# Patient Record
Sex: FEMALE | Race: WHITE | NOT HISPANIC OR LATINO | Employment: FULL TIME | URBAN - METROPOLITAN AREA
[De-identification: names, ages, dates, MRNs, and addresses within clinical notes are randomized per-mention and may not be internally consistent; named-entity substitution may affect disease eponyms.]

---

## 2017-02-01 ENCOUNTER — OFFICE VISIT (OUTPATIENT)
Dept: OPHTHALMOLOGY | Facility: CLINIC | Age: 62
End: 2017-02-01

## 2017-02-01 DIAGNOSIS — H40.003 GLAUCOMA SUSPECT OF BOTH EYES: Primary | ICD-10-CM

## 2017-02-01 DIAGNOSIS — H25.13 SENILE NUCLEAR SCLEROSIS, BILATERAL: ICD-10-CM

## 2017-02-01 DIAGNOSIS — H52.4 PRESBYOPIA: ICD-10-CM

## 2017-02-01 ASSESSMENT — TONOMETRY
OS_IOP_MMHG: 18
IOP_METHOD: APPLANATION
OD_IOP_MMHG: 18

## 2017-02-01 ASSESSMENT — VISUAL ACUITY
OD_CC+: -1
METHOD: SNELLEN - LINEAR
OS_CC+: -2
OD_CC: 20/20
OS_CC: 20/20
OD_CC: J1+
OS_CC: J1+
CORRECTION_TYPE: GLASSES

## 2017-02-01 ASSESSMENT — EXTERNAL EXAM - RIGHT EYE: OD_EXAM: NORMAL

## 2017-02-01 ASSESSMENT — REFRACTION_WEARINGRX
OD_CYLINDER: +0.25
OD_SPHERE: -2.00
OS_ADD: +2.00
OD_ADD: +2.00
OS_CYLINDER: +0.25
SPECS_TYPE: PAL
OS_AXIS: 025
OD_AXIS: 006
OS_SPHERE: -2.25

## 2017-02-01 ASSESSMENT — REFRACTION_MANIFEST
OD_CYLINDER: +0.50
OD_ADD: +2.50
OS_CYLINDER: +0.50
OS_AXIS: 035
OD_AXIS: 175
OS_ADD: +2.50
OS_SPHERE: -2.00
OD_SPHERE: -2.00

## 2017-02-01 ASSESSMENT — CUP TO DISC RATIO
OD_RATIO: 0.6
OS_RATIO: 0.5

## 2017-02-01 ASSESSMENT — CONF VISUAL FIELD
OD_NORMAL: 1
OS_NORMAL: 1
METHOD: COUNTING FINGERS

## 2017-02-01 ASSESSMENT — SLIT LAMP EXAM - LIDS
COMMENTS: NORMAL
COMMENTS: NORMAL

## 2017-02-01 ASSESSMENT — EXTERNAL EXAM - LEFT EYE: OS_EXAM: NORMAL

## 2017-02-01 NOTE — MR AVS SNAPSHOT
After Visit Summary   2017    Angelic Correa    MRN: [id number]           Patient Information     Date Of Birth          1955        Visit Information        Provider Department      2017 10:00 AM Janine Buenrostro MD [facility]        Today's Diagnoses     Glaucoma suspect of both eyes    -  1     Senile nuclear sclerosis, bilateral         Presbyopia           Care Instructions    Patient will return to clinic in 1 year with repeat dilated eye exam, and OCT with RNFL analysis.          Follow-ups after your visit        Who to contact     Please call your clinic at [phone] to:    Ask questions about your health    Make or cancel appointments    Discuss your medicines    Learn about your test results    Speak to your doctor   If you have compliments or concerns about an experience at your clinic, or if you wish to file a complaint, please contact [facility] Patient Relations at [phone] or email us at [email or web address]         Additional Information About Your Visit        MyChart Information     [employer] is an electronic gateway that provides easy, online access to your medical records. With [employer], you can request a clinic appointment, read your test results, renew a prescription or communicate with your care team.     To sign up for [employer] visit the website at [email or web address]   You will be asked to enter the access code listed below, as well as some personal information. Please follow the directions to create your username and password.     Your access code is: [email or web address]  Expires: 2017 11:41 AM     Your access code will  in 90 days. If you need help or a new code, please contact your [facility] or call [phone] for assistance.        Care EveryWhere ID     This is your Care EveryWhere ID. This could be used by other organizations to access your  [facility] medical records  [id number]         Blood Pressure from Last 3 Encounters:   No data found for BP    Weight from Last 3 Encounters:   No data found for Wt              We Performed the Following     OCT Optic Nerve RNFL Spectralis OU (both eyes)     OVF 24-2 Dynamic OU     REFRACTION        Primary Care Provider Office Phone # Fax #    Marilou Bishop [phone]       [facility] [address] [facility] [address]        Thank you!     Thank you for choosing [facility]  for your care. Our goal is always to provide you with excellent care. Hearing back from our patients is one way we can continue to improve our services. Please take a few minutes to complete the written survey that you may receive in the mail after your visit with us. Thank you!             Your Updated Medication List - Protect others around you: Learn how to safely use, store and throw away your medicines at www.disposemymeds.org.          This list is accurate as of: 2/1/17 11:41 AM.  Always use your most recent med list.                   Brand Name Dispense Instructions for use    ZOLOFT PO      Take 20 mg by mouth daily

## 2017-02-01 NOTE — PROGRESS NOTES
1)Glc Suspect based CDR -- s/p PI OU -- K pachy: 591/602   Tmax:  23/23   HVF: Full     CDR:0.6/0.5     HRT/OCT: RNFL WNL      FHX of Glc:  Parents -- gtts    Gonio:open       Intolerant to:      Asthma/COPD: No  Steroid Use: No    Kidney Stones:No     Sulfa Allergy:Possibly, unsure      IOP targets:L-M20s -- IOP good  2)NS OU  3)Myopia    Patient will return to clinic in 1 year with repeat dilated eye exam, and OCT with RNFL analysis.

## 2017-02-01 NOTE — NURSING NOTE
Chief Complaints and History of Present Illnesses   Patient presents with     Annual Eye Exam     Complete eye exam / Glaucoma suspect history / desires new glasses     HPI    Symptoms:     No blurred vision   No decreased vision   No distorted vision   No difficulty with reading   No difficulty watching television   No difficulty with driving   Able to meet visual needs of job   No double vision   No puffy eyes   No floaters   No flashes   No glare   No halos   No starbursts   No ghost images   No wandering eye   No crossed eye   No redness   No foreign body sensation   No tearing   No Dryness   No itching   No burning   No eye discharge            Comments:  Complete eye exam / Glaucoma suspect history / desires new glasses.  No other concerns.  No use of eye meds or art tears.    Joan CHRISTIAN 10:16 AM 02/01/2017

## 2017-02-01 NOTE — PATIENT INSTRUCTIONS
Patient will return to clinic in 1 year with repeat dilated eye exam, and OCT with RNFL analysis.

## 2017-11-10 LAB
ALT SERPL-CCNC: 18 IU/L (ref 8–45)
AST SERPL-CCNC: 28 IU/L (ref 2–40)
CHOLEST SERPL-MCNC: 280 MG/DL (ref 100–199)
GLUCOSE SERPL-MCNC: 91 MG/DL (ref 65–100)
HDLC SERPL-MCNC: 83 MG/DL
HEP C HIM: NORMAL
LDLC SERPL CALC-MCNC: 177 MG/DL
NONHDLC SERPL-MCNC: 197 MG/DL
PAP-ABSTRACT: NORMAL
TRIGL SERPL-MCNC: 101 MG/DL
TSH SERPL-ACNC: 0.83 UIU/ML (ref 0.35–4.94)

## 2017-12-12 ENCOUNTER — TELEPHONE (OUTPATIENT)
Dept: OPHTHALMOLOGY | Facility: CLINIC | Age: 62
End: 2017-12-12

## 2017-12-12 NOTE — TELEPHONE ENCOUNTER
----- Message from Edson Hicks sent at 12/8/2017  2:38 PM CST -----  Regarding: Change 12/22 appt, sched VF  Contact: [phone]  Pt called to schedule Dr. Buenrostro appt (At [facility]) before the end of the year. Not knowing he will most likely be out of clinic, I scheduled her on 12/22 because it was the only day that showed up before 1/1/18. Mariya at [facility] told me you were filling appts before the end of the year w/ Porter at the [facility]. Please call pt to fit her in w/ Dr. Buenrostro for glaucoma appt before the end of the year at the [facility]. Pt phone is [phone].    Thank you,    Edson Hicks  Call Center    Please DO NOT send this message and/or reply back to sender. Call Center Representatives DO NOT respond to messages.

## 2017-12-12 NOTE — TELEPHONE ENCOUNTER
Left message with call back number for pt to reschedule appt from 12/22/17 at [facility] to sometime at [facility] (per epic message).  Jeni Simon COA 10:03 AM December 12, 2017

## 2017-12-22 ENCOUNTER — OFFICE VISIT (OUTPATIENT)
Dept: OPHTHALMOLOGY | Facility: CLINIC | Age: 62
End: 2017-12-22
Payer: COMMERCIAL

## 2017-12-22 DIAGNOSIS — H40.003 GLAUCOMA SUSPECT OF BOTH EYES: Primary | ICD-10-CM

## 2017-12-22 DIAGNOSIS — H52.4 PRESBYOPIA: ICD-10-CM

## 2017-12-22 DIAGNOSIS — H25.13 SENILE NUCLEAR SCLEROSIS, BILATERAL: ICD-10-CM

## 2017-12-22 RX ORDER — FLUTICASONE PROPIONATE 50 MCG
1 SPRAY, SUSPENSION (ML) NASAL
COMMUNITY
Start: 2016-11-04 | End: 2023-04-25

## 2017-12-22 ASSESSMENT — VISUAL ACUITY
METHOD: SNELLEN - LINEAR
OS_CC: 20/20
OD_CC: 20/15
CORRECTION_TYPE: GLASSES

## 2017-12-22 ASSESSMENT — REFRACTION_WEARINGRX
OS_AXIS: 025
SPECS_TYPE: PAL
OS_SPHERE: -2.25
OD_AXIS: 006
OD_ADD: +2.00
OS_CYLINDER: +0.25
OS_ADD: +2.00
OD_SPHERE: -2.00
OD_CYLINDER: +0.25

## 2017-12-22 ASSESSMENT — EXTERNAL EXAM - LEFT EYE: OS_EXAM: NORMAL

## 2017-12-22 ASSESSMENT — TONOMETRY
OS_IOP_MMHG: 21
IOP_METHOD: APPLANATION
OD_IOP_MMHG: 21

## 2017-12-22 ASSESSMENT — CUP TO DISC RATIO
OS_RATIO: 0.5
OD_RATIO: 0.6

## 2017-12-22 ASSESSMENT — CONF VISUAL FIELD
OS_NORMAL: 1
OD_NORMAL: 1

## 2017-12-22 ASSESSMENT — SLIT LAMP EXAM - LIDS
COMMENTS: NORMAL
COMMENTS: NORMAL

## 2017-12-22 ASSESSMENT — EXTERNAL EXAM - RIGHT EYE: OD_EXAM: NORMAL

## 2017-12-22 NOTE — NURSING NOTE
Chief Complaints and History of Present Illnesses   Patient presents with     Follow Up For     Glaucoma suspect of both eyes     HPI    Affected eye(s):  Both   Symptoms:     No blurred vision   No decreased vision   No distorted vision   No floaters   No flashes      Duration:  10 months   Frequency:  Constant       Do you have eye pain now?:  No      Comments:  States va is the same since last visit.  Suhail Austin  11:41 AM December 22, 2017

## 2017-12-22 NOTE — PROGRESS NOTES
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      Attending Physician Attestation:  Complete documentation of historical and exam elements from today's encounter can be found in the full encounter summary report (not reduplicated in this progress note). I personally obtained the chief complaint(s) and history of present illness.  I confirmed and edited as necessary the review of systems, past medical/surgical history, family history, social history, and examination findings as documented by others; and I examined the patient myself. I personally reviewed the relevant tests, images, and reports as documented above. I formulated and edited as necessary the assessment and plan and discussed the findings and management plan with the patient and family.  - Janine Buenrostro MD

## 2017-12-22 NOTE — MR AVS SNAPSHOT
After Visit Summary   2017    Angelic Correa    MRN: [id number]           Patient Information     Date Of Birth          1955        Visit Information        Provider Department      2017 10:45 AM Janine Buenrostro MD [facility]        Today's Diagnoses     Glaucoma suspect of both eyes    -  1    Presbyopia        Senile nuclear sclerosis, bilateral          Care Instructions    Patient will return to clinic in 1 year with repeat dilated eye exam, and visual field test.            Follow-ups after your visit        Follow-up notes from your care team     Return 1 year with repeat dilated eye exam, and visual field test. .      Who to contact     Please call your clinic at [phone] to:    Ask questions about your health    Make or cancel appointments    Discuss your medicines    Learn about your test results    Speak to your doctor   If you have compliments or concerns about an experience at your clinic, or if you wish to file a complaint, please contact [facility] Patient Relations at [phone] or email us at [email or web address]         Additional Information About Your Visit        MyChart Information     [employer] is an electronic gateway that provides easy, online access to your medical records. With [employer], you can request a clinic appointment, read your test results, renew a prescription or communicate with your care team.     To sign up for [employer] visit the website at [email or web address]   You will be asked to enter the access code listed below, as well as some personal information. Please follow the directions to create your username and password.     Your access code is: [email or web address]  Expires: 3/11/2018  6:30 AM     Your access code will  in 90 days. If you need help or a new code, please contact your [facility] or call [phone] for assistance.         Care EveryWhere ID     This is your Care EveryWhere ID. This could be used by other organizations to access your [facility] medical records  [id number]         Blood Pressure from Last 3 Encounters:   No data found for BP    Weight from Last 3 Encounters:   No data found for Wt              We Performed the Following     OCT Optic Nerve RNFL Spectralis OU (both eyes)        Primary Care Provider Office Phone # Fax #    Marilou Bishop [phone]       [facility] [address]  [facility] [address]        Equal Access to Services     MATT COKER : Hadii aad ku hadasho Soomaali, waaxda luqadaha, qaybta kaalmada adeegyada, waxay idiin hayaan adeeg kharash lamika . So [facility] [phone].    ATENCIÓN: Si habla español, tiene a kaplan disposición servicios gratuitos de asistencia lingüística. Llame al [phone].    We comply with applicable federal civil rights laws and [place] laws. We do not discriminate on the basis of race, color, national origin, age, disability, sex, sexual orientation, or gender identity.            Thank you!     Thank you for choosing [facility]  for your care. Our goal is always to provide you with excellent care. Hearing back from our patients is one way we can continue to improve our services. Please take a few minutes to complete the written survey that you may receive in the mail after your visit with us. Thank you!             Your Updated Medication List - Protect others around you: Learn how to safely use, store and throw away your medicines at www.disposemymeds.org.          This list is accurate as of: 12/22/17 12:01 PM.  Always use your most recent med list.                   Brand Name Dispense Instructions for use Diagnosis    fluticasone 50 MCG/ACT spray    FLONASE     1 spray        ZOLOFT PO      Take 20 mg by mouth daily

## 2018-12-17 ENCOUNTER — TRANSFERRED RECORDS (OUTPATIENT)
Dept: MULTI SPECIALTY CLINIC | Facility: CLINIC | Age: 63
End: 2018-12-17

## 2019-04-09 ENCOUNTER — OFFICE VISIT (OUTPATIENT)
Dept: FAMILY MEDICINE | Facility: CLINIC | Age: 64
End: 2019-04-09

## 2019-04-09 VITALS
DIASTOLIC BLOOD PRESSURE: 84 MMHG | WEIGHT: 160 LBS | HEART RATE: 74 BPM | OXYGEN SATURATION: 100 % | RESPIRATION RATE: 16 BRPM | SYSTOLIC BLOOD PRESSURE: 136 MMHG | TEMPERATURE: 97.9 F

## 2019-04-09 DIAGNOSIS — R51.9 UNILATERAL FACIAL PAIN: Primary | ICD-10-CM

## 2019-04-09 PROCEDURE — 99214 OFFICE O/P EST MOD 30 MIN: CPT | Performed by: PHYSICIAN ASSISTANT

## 2019-04-09 RX ORDER — METHYLPREDNISOLONE 4 MG
TABLET, DOSE PACK ORAL
Qty: 21 TABLET | Refills: 0 | Status: SHIPPED | OUTPATIENT
Start: 2019-04-09 | End: 2019-04-16

## 2019-04-09 RX ORDER — VALACYCLOVIR HYDROCHLORIDE 1 G/1
1000 TABLET, FILM COATED ORAL 3 TIMES DAILY
Qty: 21 TABLET | Refills: 0 | Status: SHIPPED | OUTPATIENT
Start: 2019-04-09 | End: 2019-08-27

## 2019-04-09 ASSESSMENT — ENCOUNTER SYMPTOMS
MUSCULOSKELETAL NEGATIVE: 1
CARDIOVASCULAR NEGATIVE: 1
ENDOCRINE NEGATIVE: 1
EYES NEGATIVE: 1
GASTROINTESTINAL NEGATIVE: 1
PSYCHIATRIC NEGATIVE: 1

## 2019-04-09 NOTE — PROGRESS NOTES
SUBJECTIVE:                                                    Angelic Correa is a 64 year old female who presents to clinic today for the following health issues:      ACUTE ILLNESS SYMPTOMS      Duration: 2 weeks    Description  Fatigue/malaise, facial pain, neck pain and jaw pain    Severity: moderate    Accompanying signs and symptoms: getting over URI    History (predisposing factors):  none    Precipitating or alleviating factors: None    Therapies tried and outcome:  OTC NSAID    She had influenza two weeks ago  He has had residual malaise  The jaw pain started on the left (she uses a [employer] store TMJ mouth guard)  Pain is radiating down her face  Acupuncture yesterday - minimal relief  Now tooth pain as well  Tender left side of her neck  Left ear is slightly painful, no ear drainage  Her teeth are very sensitive - even with eating a sandwich   No pain with opening and closing the jaw  Worse when she gets up and moves around  Baseline tooth sensitivities are slightly worse  She is due for a teeth cleaning, but mostly up to date with dental visit    No vision changes  No tenderness of the left ear  No dizziness    She is travelling to [facility] this weekend    Problem list and histories reviewed & adjusted, as indicated.  Additional history: as documented    Patient Active Problem List   Diagnosis     Glaucoma suspect     Glaucoma suspect of both eyes     Senile nuclear sclerosis, bilateral     Presbyopia     Past Surgical History:   Procedure Laterality Date     LASER SURGERY OF EYE         Social History     Tobacco Use     Smoking status: Former Smoker     Last attempt to quit: 1996     Years since quittin.2     Smokeless tobacco: Never Used   Substance Use Topics     Alcohol use: Yes     Family History   Problem Relation Age of Onset     Glaucoma Mother      Glaucoma Maternal Aunt      Glaucoma Cousin      Macular Degeneration No family hx of          Current Outpatient Medications   Medication Sig  Dispense Refill     methylPREDNISolone (MEDROL DOSEPAK) 4 MG tablet therapy pack Follow Package Directions 21 tablet 0     Sertraline HCl (ZOLOFT PO) Take 20 mg by mouth daily       valACYclovir (VALTREX) 1000 mg tablet Take 1 tablet (1,000 mg) by mouth 3 times daily for 7 days 21 tablet 0     fluticasone (FLONASE) 50 MCG/ACT spray 1 spray       Allergies   Allergen Reactions     Pilocarpine Nausea and Vomiting       Review of Systems   Constitutional:        As in HPI   HENT:        As in HPI   Eyes: Negative.    Respiratory:        As in HPI   Cardiovascular: Negative.    Gastrointestinal: Negative.    Endocrine: Negative.    Genitourinary: Negative.    Musculoskeletal: Negative.    Skin: Negative.    Neurological:        As in HPI   Psychiatric/Behavioral: Negative.        OBJECTIVE:     /84   Pulse 74   Temp 97.9  F (36.6  C) (Tympanic)   Resp 16   Wt 72.6 kg (160 lb)   SpO2 100%   There is no height or weight on file to calculate BMI.    Physical Exam   Constitutional: She is oriented to person, place, and time. She appears well-developed and well-nourished. No distress.   HENT:   Head: Normocephalic.   Right Ear: External ear and ear canal normal. No tenderness.   Left Ear: External ear and ear canal normal. No tenderness.   Nose: Nose normal.   Mouth/Throat: Uvula is midline and oropharynx is clear and moist. No oral lesions. No dental abscesses or dental caries (teeth palpated - no tenderness).   No TMJ tenderness or palpable subluxation/dislocation   Eyes: Conjunctivae and EOM are normal.   Neck: Normal range of motion.   Pulmonary/Chest: Effort normal.   Lymphadenopathy:        Head (right side): No submandibular, no preauricular and no posterior auricular adenopathy present.        Head (left side): No submandibular, no preauricular and no posterior auricular adenopathy present.     She has no cervical adenopathy.   Neurological: She is alert and oriented to person, place, and time. A sensory  deficit (slight decreased sensation on the left side of the face) is present.   Cranial Nerves:  EYES: Normal, No nystagmus, EOM, PERRL  Normal corneal reflex  Normal masseter / temporalis  Normal face strength and symmetry  Normal hearing  Normal swallowing  Uvula midline  Full trapezius / sternocleidomastoid strength  Normal tongue protrusion   Skin: Skin is warm and dry. No rash noted. She is not diaphoretic.   Psychiatric: She has a normal mood and affect. Judgment normal.       No results found for this or any previous visit (from the past 24 hour(s)).    ASSESSMENT/PLAN:       ICD-10-CM    1. Unilateral facial pain R51 methylPREDNISolone (MEDROL DOSEPAK) 4 MG tablet therapy pack     valACYclovir (VALTREX) 1000 mg tablet      Differential includes   - trigeminal neuralgia - although her history is not classic for this.  I would like her to come back in a week and recheck.  Consider adding carbamazepine if still present.   - shingles - no skin lesions today, however she is instructed to start Valtrex ASAP if she develops a rash or blisters.  Rx for valtrex was printed today.   - TMJ: exam in inconsistent with this  - Temporal arteritis - exam is not consistent with this as she is not specifically tender along the temporal artery  - Dental caries - less likely with her exam today, although she should still schedule a dental cleaning      Return in about 1 week (around 4/16/2019) for facial pain recheck .    There are no Patient Instructions on file for this visit.    Mendez Rodriguez PA-C  [facility]

## 2019-04-16 ENCOUNTER — OFFICE VISIT (OUTPATIENT)
Dept: FAMILY MEDICINE | Facility: CLINIC | Age: 64
End: 2019-04-16

## 2019-04-16 ENCOUNTER — MYC MEDICAL ADVICE (OUTPATIENT)
Dept: FAMILY MEDICINE | Facility: CLINIC | Age: 64
End: 2019-04-16

## 2019-04-16 VITALS
WEIGHT: 160 LBS | SYSTOLIC BLOOD PRESSURE: 120 MMHG | RESPIRATION RATE: 16 BRPM | DIASTOLIC BLOOD PRESSURE: 70 MMHG | TEMPERATURE: 98 F | HEART RATE: 82 BPM

## 2019-04-16 DIAGNOSIS — R51.9 UNILATERAL FACIAL PAIN: Primary | ICD-10-CM

## 2019-04-16 DIAGNOSIS — K12.0 ULCER APHTHOUS ORAL: ICD-10-CM

## 2019-04-16 PROCEDURE — 99213 OFFICE O/P EST LOW 20 MIN: CPT | Performed by: PHYSICIAN ASSISTANT

## 2019-04-16 RX ORDER — CARBAMAZEPINE 200 MG/1
200 CAPSULE, EXTENDED RELEASE ORAL 2 TIMES DAILY
Qty: 60 CAPSULE | Refills: 1 | Status: SHIPPED | OUTPATIENT
Start: 2019-04-16 | End: 2019-08-27

## 2019-04-16 NOTE — PATIENT INSTRUCTIONS
Patient Education     Trigeminal Neuralgia  You have trigeminal neuralgia. This is pain caused by irritation of the trigeminal nerve on your face. Symptoms include sudden, sharp pain in your head or face. It may feel like an electric shock. It can last for several seconds or minutes. It usually happens on only 1 side of your face. Pain may be triggered by things like moving your jaw or a touch on the skin of your face. The pain may be caused by something irritating the trigeminal nerve, such as a blood vessel pressing against it. But the exact cause of this problem often isn t known. Although it can be quite painful, the condition isn t dangerous.  Trigeminal neuralgia is often treated with medicines. These include anti-seizure medicines or antidepressants. Certain other treatments may also help. In some cases, you may need surgery.    Home care  Your healthcare provider may prescribe medicines to help relieve and prevent pain. Take all medicines as directed. Please note that it may take several changes in dose and medicines before the right combination is found that controls the pain.  General care:    Plan to rest at home today.    Avoid any specific activities that seem to trigger the pain.    Over the next few weeks, keep a pain diary. Write down when your symptoms happen and how they feel. Certain activities such as touching your face, chewing, talking, or brushing your teeth may bring on the pain. Cold air can also trigger the pain. Make sure you write down any triggers and discuss these with your healthcare provider. This will help guide treatment.  Follow-up care  Follow up with your healthcare provider, or as advised. If you were referred to a neurologist, be sure to make an appointment.  For more information on your condition, visit:    Facial [employer] Association www.fpa-support.org  When to seek medical advice  Call your healthcare provider right away if any of these occur:    Fever of 100.4 F (38 C) or  higher, or as advised    Headache with very stiff neck    You aren t able to keep liquids down (repeated vomiting)    Extreme drowsiness or confusion    Dizziness or fainting    A new feeling of weakness or numbness or tingling in your arm, leg, or face    Difficulty speaking or seeing  Date Last Reviewed: 8/1/2016 2000-2018 [employer]. [address] [place], [place] [id number]. All rights reserved. This information is not intended as a substitute for professional medical care. Always follow your healthcare professional's instructions.

## 2019-04-16 NOTE — PROGRESS NOTES
SUBJECTIVE:   Angelic Correa is a 64 year old female who presents to clinic today for the following   health issues:        Jaw Pain      Duration: 2 weeks    Description (location/character/radiation): jaw pain    Intensity:  mild    Accompanying signs and symptoms: 7/10 constant pain    History (similar episodes/previous evaluation): seen here 19    Precipitating or alleviating factors: None    Therapies tried and outcome: medrol dose carmela- not effective     Her pain is still present, however is now more localized to the left temple and left jaw under the mandible.  She also has new sores in her mouth she has not started the acyclovir.    She has pain with eating, however not always.   She is tender over the ulcers in her mouth.  She commonly gets mouth ulcers when she is ill and states this has happened many times before.   She denies and facial weakness      Additional history: as documented    Reviewed  and updated as needed this visit by clinical staff  Tobacco  Allergies  Meds         Reviewed and updated as needed this visit by Provider         Patient Active Problem List   Diagnosis     Glaucoma suspect     Glaucoma suspect of both eyes     Senile nuclear sclerosis, bilateral     Presbyopia     Past Surgical History:   Procedure Laterality Date     LASER SURGERY OF EYE         Social History     Tobacco Use     Smoking status: Former Smoker     Last attempt to quit: 1996     Years since quittin.3     Smokeless tobacco: Never Used   Substance Use Topics     Alcohol use: Yes     Family History   Problem Relation Age of Onset     Glaucoma Mother      Glaucoma Maternal Aunt      Glaucoma Cousin      Macular Degeneration No family hx of          Current Outpatient Medications   Medication Sig Dispense Refill     carBAMazepine (CARBATROL) 200 MG 12 hr capsule Take 1 capsule (200 mg) by mouth 2 times daily 60 capsule 1     fluticasone (FLONASE) 50 MCG/ACT spray 1 spray       magic mouthwash (ENTER  INGREDIENTS IN COMMENTS) suspension Take 5 mLs by mouth every 4 hours as needed (mouth pain) Mix 1:1:1 liquid diphenhydramine, viscous lidocaine, and  mL 0     Sertraline HCl (ZOLOFT PO) Take 20 mg by mouth daily       valACYclovir (VALTREX) 1000 mg tablet Take 1 tablet (1,000 mg) by mouth 3 times daily for 7 days 21 tablet 0     valACYclovir (VALTREX) 1000 mg tablet Take 1 tablet (1,000 mg) by mouth 3 times daily for 7 days 21 tablet 0     Allergies   Allergen Reactions     Pilocarpine Nausea and Vomiting       Review of Systems   Constitutional: Negative.    HENT:        As in HPI   Eyes: Negative.    Respiratory: Negative.    Cardiovascular: Negative.    Gastrointestinal: Negative.    Genitourinary: Negative.    Musculoskeletal: Negative.    Skin: Negative.    Neurological:        As in HPI   Psychiatric/Behavioral: Negative.        OBJECTIVE:     /70 (Cuff Size: Adult Regular)   Pulse 82   Temp 98  F (36.7  C) (Tympanic)   Resp 16   Wt 72.6 kg (160 lb)   There is no height or weight on file to calculate BMI.    Physical Exam   Constitutional: She is oriented to person, place, and time. She appears well-developed and well-nourished. No distress.   HENT:   Head: Normocephalic.   Right Ear: External ear normal.   Left Ear: External ear normal.   Nose: Nose normal.   Mouth/Throat: Oral lesions present. No dental abscesses or dental caries (no tenderness when palpating the teeth).       Eyes: Conjunctivae and EOM are normal.   Neck: Normal range of motion.   Pulmonary/Chest: Effort normal.   Neurological: She is alert and oriented to person, place, and time.   Normal facial sensation bilaterally.   Facial muscles symmetric.   Skin: She is not diaphoretic.   Psychiatric: She has a normal mood and affect. Judgment normal.       No results found for this or any previous visit (from the past 24 hour(s)).    ASSESSMENT/PLAN:       ICD-10-CM    1. Unilateral facial pain R51 carBAMazepine (CARBATROL) 200 MG  12 hr capsule     valACYclovir (VALTREX) 1000 mg tablet   2. Ulcer aphthous oral K12.0 magic mouthwash (ENTER INGREDIENTS IN COMMENTS) suspension     valACYclovir (VALTREX) 1000 mg tablet      Patient has acyclovir Rx but has not yet started it  Diagnosis is not perfectly clear - DDx includes trigeminal neuralgia, shingles, aphthous ulcers  Will cover for shingles as well    Return in about 2 weeks (around 4/30/2019) for facial pain recheck.    Patient Instructions     Patient Education     Trigeminal Neuralgia  You have trigeminal neuralgia. This is pain caused by irritation of the trigeminal nerve on your face. Symptoms include sudden, sharp pain in your head or face. It may feel like an electric shock. It can last for several seconds or minutes. It usually happens on only 1 side of your face. Pain may be triggered by things like moving your jaw or a touch on the skin of your face. The pain may be caused by something irritating the trigeminal nerve, such as a blood vessel pressing against it. But the exact cause of this problem often isn t known. Although it can be quite painful, the condition isn t dangerous.  Trigeminal neuralgia is often treated with medicines. These include anti-seizure medicines or antidepressants. Certain other treatments may also help. In some cases, you may need surgery.    Home care  Your healthcare provider may prescribe medicines to help relieve and prevent pain. Take all medicines as directed. Please note that it may take several changes in dose and medicines before the right combination is found that controls the pain.  General care:    Plan to rest at home today.    Avoid any specific activities that seem to trigger the pain.    Over the next few weeks, keep a pain diary. Write down when your symptoms happen and how they feel. Certain activities such as touching your face, chewing, talking, or brushing your teeth may bring on the pain. Cold air can also trigger the pain. Make sure  you write down any triggers and discuss these with your healthcare provider. This will help guide treatment.  Follow-up care  Follow up with your healthcare provider, or as advised. If you were referred to a neurologist, be sure to make an appointment.  For more information on your condition, visit:    [employer] Association www.fpa-support.org  When to seek medical advice  Call your healthcare provider right away if any of these occur:    Fever of 100.4 F (38 C) or higher, or as advised    Headache with very stiff neck    You aren t able to keep liquids down (repeated vomiting)    Extreme drowsiness or confusion    Dizziness or fainting    A new feeling of weakness or numbness or tingling in your arm, leg, or face    Difficulty speaking or seeing  Date Last Reviewed: 8/1/2016 2000-2018 [employer]. [address] [place] [address], [place] [address]. All rights reserved. This information is not intended as a substitute for professional medical care. Always follow your healthcare professional's instructions.               Mendez Rodriguez PA-C  [facility]

## 2019-04-16 NOTE — TELEPHONE ENCOUNTER
Mendez would like to see patient. I reached patient and we scheduled    Jesi Barger RN- Triage [employer]

## 2019-04-18 RX ORDER — VALACYCLOVIR HYDROCHLORIDE 1 G/1
1000 TABLET, FILM COATED ORAL 3 TIMES DAILY
Qty: 21 TABLET | Refills: 0 | Status: SHIPPED | OUTPATIENT
Start: 2019-04-18 | End: 2019-08-27

## 2019-04-18 ASSESSMENT — ENCOUNTER SYMPTOMS
GASTROINTESTINAL NEGATIVE: 1
EYES NEGATIVE: 1
CONSTITUTIONAL NEGATIVE: 1
CARDIOVASCULAR NEGATIVE: 1
PSYCHIATRIC NEGATIVE: 1
RESPIRATORY NEGATIVE: 1
MUSCULOSKELETAL NEGATIVE: 1

## 2019-05-03 ENCOUNTER — HEALTH MAINTENANCE LETTER (OUTPATIENT)
Age: 64
End: 2019-05-03

## 2019-06-14 ENCOUNTER — OFFICE VISIT (OUTPATIENT)
Dept: OPHTHALMOLOGY | Facility: CLINIC | Age: 64
End: 2019-06-14
Payer: COMMERCIAL

## 2019-06-14 DIAGNOSIS — H40.003 GLAUCOMA SUSPECT OF BOTH EYES: Primary | ICD-10-CM

## 2019-06-14 DIAGNOSIS — H25.13 SENILE NUCLEAR SCLEROSIS, BILATERAL: ICD-10-CM

## 2019-06-14 ASSESSMENT — TONOMETRY
OD_IOP_MMHG: 16
OS_IOP_MMHG: 17
IOP_METHOD: APPLANATION

## 2019-06-14 ASSESSMENT — REFRACTION_MANIFEST
OS_CYLINDER: +0.50
OD_ADD: +2.50
OD_AXIS: 005
OS_SPHERE: -1.75
OS_ADD: +2.50
OS_AXIS: 035
OD_SPHERE: -2.00
OD_CYLINDER: +0.75

## 2019-06-14 ASSESSMENT — SLIT LAMP EXAM - LIDS
COMMENTS: NORMAL
COMMENTS: NORMAL

## 2019-06-14 ASSESSMENT — VISUAL ACUITY
OS_CC: 20/20
METHOD: SNELLEN - LINEAR
OD_CC: 20/20
CORRECTION_TYPE: GLASSES
OS_CC+: -1

## 2019-06-14 ASSESSMENT — CONF VISUAL FIELD
OS_NORMAL: 1
METHOD: COUNTING FINGERS
OD_NORMAL: 1

## 2019-06-14 ASSESSMENT — REFRACTION_WEARINGRX
OS_SPHERE: -2.25
OD_CYLINDER: +0.50
OS_CYLINDER: +0.50
OS_AXIS: 035
OD_AXIS: 175
OD_ADD: +2.50
OS_ADD: +2.50
OD_SPHERE: -2.00

## 2019-06-14 ASSESSMENT — EXTERNAL EXAM - LEFT EYE: OS_EXAM: NORMAL

## 2019-06-14 ASSESSMENT — CUP TO DISC RATIO
OD_RATIO: 0.6
OS_RATIO: 0.5

## 2019-06-14 ASSESSMENT — EXTERNAL EXAM - RIGHT EYE: OD_EXAM: NORMAL

## 2019-06-14 NOTE — NURSING NOTE
Chief Complaints and History of Present Illnesses   Patient presents with     Glaucoma Follow-Up     Chief Complaint(s) and History of Present Illness(es)     Glaucoma Follow-Up     Laterality: both eyes    Quality: Is having changes in the va      Associated symptoms: Negative for redness, dryness and tearing    Treatment side effects: none    Pain scale: 0/10              Comments     Elisabeth CHRISTIAN 10:58 AM June 14, 2019

## 2019-06-14 NOTE — PROGRESS NOTES
1)Glc Suspect based CDR -- s/p PI OU -- K pachy: 591/602   Tmax:  23/23   HVF: Full     CDR:0.6/0.5     HRT/OCT: RNFL WNL      FHX of Glc:  Parents -- gtts    Gonio:open       Intolerant to:      Asthma/COPD: No  Steroid Use: No    Kidney Stones:No     Sulfa Allergy:Possibly, unsure      IOP targets:L-M20s -- IOP good  2)NS OU  3)Myopia    Patient will return to clinic in 1 year with repeat dilated eye exam, and visual field test and OCT with RNFL analysis.      Attending Physician Attestation:  Complete documentation of historical and exam elements from today's encounter can be found in the full encounter summary report (not reduplicated in this progress note). I personally obtained the chief complaint(s) and history of present illness.  I confirmed and edited as necessary the review of systems, past medical/surgical history, family history, social history, and examination findings as documented by others; and I examined the patient myself. I personally reviewed the relevant tests, images, and reports as documented above. I formulated and edited as necessary the assessment and plan and discussed the findings and management plan with the patient and family.  - Janine Buenrostro MD

## 2019-06-14 NOTE — PATIENT INSTRUCTIONS
Patient will return to clinic in 1 year with repeat dilated eye exam, and visual field test and OCT with RNFL analysis.

## 2019-08-23 DIAGNOSIS — F32.5 MAJOR DEPRESSIVE DISORDER IN FULL REMISSION, UNSPECIFIED WHETHER RECURRENT (H): Primary | ICD-10-CM

## 2019-08-23 RX ORDER — SERTRALINE HYDROCHLORIDE 100 MG/1
100 TABLET, FILM COATED ORAL DAILY
Qty: 90 TABLET | Refills: 1 | Status: SHIPPED | OUTPATIENT
Start: 2019-08-23 | End: 2019-08-27

## 2019-08-23 RX ORDER — SERTRALINE HYDROCHLORIDE 100 MG/1
20 TABLET, FILM COATED ORAL DAILY
Status: CANCELLED | OUTPATIENT
Start: 2019-08-23

## 2019-08-23 NOTE — TELEPHONE ENCOUNTER
"Routing refill request to provider for review/approval because:  Medication is reported historical at 20 mg dose. Patient states she takes 100 mg. Provider to clarify which dose pt should be taking.    Requested Prescriptions   Pending Prescriptions Disp Refills     sertraline (ZOLOFT) 100 MG tablet  Last Written Prescription Date:  n/a  Last Fill Quantity: n/a,  # refills: n/a   Last office visit: 4/16/2019 with prescribing provider:  Michael   Future Office Visit:   Next 5 appointments (look out 90 days)    Aug 27, 2019 10:30 AM CDT  PHYSICAL with Priscila Rodriguez PA-C  [facility] [address] [place] [id number] [phone]                Sig: Take 0.5 tablets (50 mg) by mouth daily       SSRIs Protocol Passed - 8/23/2019  9:46 AM        Passed - Recent (12 mo) or future (30 days) visit within the authorizing provider's specialty     Patient had office visit in the last 12 months or has a visit in the next 30 days with authorizing provider or within the authorizing provider's specialty.  See \"Patient Info\" tab in inbasket, or \"Choose Columns\" in Meds & Orders section of the refill encounter.              Passed - Medication is active on med list        Passed - Patient is age 18 or older        Passed - No active pregnancy on record        Passed - No positive pregnancy test in last 12 months          "

## 2019-08-23 NOTE — TELEPHONE ENCOUNTER
Reason for Call:  Medication or medication refill:    Do you use a [facility]?  Name of the pharmacy and phone number for the current request:  [facility]    Name of the medication requested: Sertraline HCl (ZOLOFT PO) (patient states she take 100mg a day)    Other request: Patient states she takes 100mg per day    Can we leave a detailed message on this number? YES    Phone number patient can be reached at: Home number on file [phone] (home)    Best Time:     Call taken on 8/23/2019 at 9:45 AM by LORRAINE ERNANDEZ

## 2019-08-27 ENCOUNTER — OFFICE VISIT (OUTPATIENT)
Dept: FAMILY MEDICINE | Facility: CLINIC | Age: 64
End: 2019-08-27
Payer: COMMERCIAL

## 2019-08-27 VITALS
HEIGHT: 67 IN | TEMPERATURE: 98 F | OXYGEN SATURATION: 97 % | RESPIRATION RATE: 16 BRPM | DIASTOLIC BLOOD PRESSURE: 88 MMHG | BODY MASS INDEX: 24.88 KG/M2 | SYSTOLIC BLOOD PRESSURE: 132 MMHG | HEART RATE: 76 BPM | WEIGHT: 158.5 LBS

## 2019-08-27 DIAGNOSIS — F32.5 MAJOR DEPRESSIVE DISORDER IN FULL REMISSION, UNSPECIFIED WHETHER RECURRENT (H): ICD-10-CM

## 2019-08-27 DIAGNOSIS — Z12.31 ENCOUNTER FOR SCREENING MAMMOGRAM FOR BREAST CANCER: ICD-10-CM

## 2019-08-27 DIAGNOSIS — Z00.00 ROUTINE GENERAL MEDICAL EXAMINATION AT A HEALTH CARE FACILITY: Primary | ICD-10-CM

## 2019-08-27 DIAGNOSIS — Z23 NEED FOR HEPATITIS A IMMUNIZATION: ICD-10-CM

## 2019-08-27 PROCEDURE — 99396 PREV VISIT EST AGE 40-64: CPT | Mod: 25 | Performed by: PHYSICIAN ASSISTANT

## 2019-08-27 PROCEDURE — 90632 HEPA VACCINE ADULT IM: CPT | Performed by: PHYSICIAN ASSISTANT

## 2019-08-27 PROCEDURE — 90471 IMMUNIZATION ADMIN: CPT | Performed by: PHYSICIAN ASSISTANT

## 2019-08-27 RX ORDER — SERTRALINE HYDROCHLORIDE 100 MG/1
150 TABLET, FILM COATED ORAL DAILY
Qty: 135 TABLET | Refills: 1 | Status: SHIPPED | OUTPATIENT
Start: 2019-08-27 | End: 2020-04-01

## 2019-08-27 RX ORDER — MOMETASONE FUROATE MONOHYDRATE 50 UG/1
2 SPRAY, METERED NASAL DAILY
COMMUNITY
End: 2020-07-17

## 2019-08-27 RX ORDER — CLOBETASOL PROPIONATE 0.5 MG/G
CREAM TOPICAL 2 TIMES DAILY PRN
COMMUNITY
End: 2020-01-21

## 2019-08-27 ASSESSMENT — ANXIETY QUESTIONNAIRES
GAD7 TOTAL SCORE: 0
5. BEING SO RESTLESS THAT IT IS HARD TO SIT STILL: NOT AT ALL
7. FEELING AFRAID AS IF SOMETHING AWFUL MIGHT HAPPEN: NOT AT ALL
7. FEELING AFRAID AS IF SOMETHING AWFUL MIGHT HAPPEN: NOT AT ALL
1. FEELING NERVOUS, ANXIOUS, OR ON EDGE: NOT AT ALL
4. TROUBLE RELAXING: NOT AT ALL
GAD7 TOTAL SCORE: 0
6. BECOMING EASILY ANNOYED OR IRRITABLE: NOT AT ALL
GAD7 TOTAL SCORE: 0
3. WORRYING TOO MUCH ABOUT DIFFERENT THINGS: NOT AT ALL
2. NOT BEING ABLE TO STOP OR CONTROL WORRYING: NOT AT ALL

## 2019-08-27 ASSESSMENT — ENCOUNTER SYMPTOMS
CARDIOVASCULAR NEGATIVE: 1
EYES NEGATIVE: 1
NEUROLOGICAL NEGATIVE: 1
PSYCHIATRIC NEGATIVE: 1
GASTROINTESTINAL NEGATIVE: 1
MUSCULOSKELETAL NEGATIVE: 1

## 2019-08-27 ASSESSMENT — MIFFLIN-ST. JEOR: SCORE: 1307.61

## 2019-08-27 ASSESSMENT — PATIENT HEALTH QUESTIONNAIRE - PHQ9
10. IF YOU CHECKED OFF ANY PROBLEMS, HOW DIFFICULT HAVE THESE PROBLEMS MADE IT FOR YOU TO DO YOUR WORK, TAKE CARE OF THINGS AT HOME, OR GET ALONG WITH OTHER PEOPLE: SOMEWHAT DIFFICULT
SUM OF ALL RESPONSES TO PHQ QUESTIONS 1-9: 2
SUM OF ALL RESPONSES TO PHQ QUESTIONS 1-9: 2

## 2019-08-27 NOTE — PATIENT INSTRUCTIONS
Preventive Health Recommendations  Female Ages 50 - 64    Yearly exam: See your health care provider every year in order to  o Review health changes.   o Discuss preventive care.    o Review your medicines if your doctor has prescribed any.      Get a Pap test every three years (unless you have an abnormal result and your provider advises testing more often).    If you get Pap tests with HPV test, you only need to test every 5 years, unless you have an abnormal result.     You do not need a Pap test if your uterus was removed (hysterectomy) and you have not had cancer.    You should be tested each year for STDs (sexually transmitted diseases) if you're at risk.     Have a mammogram every 1 to 2 years.    Have a colonoscopy at age 50, or have a yearly FIT test (stool test). These exams screen for colon cancer.      Have a cholesterol test every 5 years, or more often if advised.    Have a diabetes test (fasting glucose) every three years. If you are at risk for diabetes, you should have this test more often.     If you are at risk for osteoporosis (brittle bone disease), think about having a bone density scan (DEXA).    Shots: Get a flu shot each year. Get a tetanus shot every 10 years.    Nutrition:     Eat at least 5 servings of fruits and vegetables each day.    Eat whole-grain bread, whole-wheat pasta and brown rice instead of white grains and rice.    Get adequate Calcium and Vitamin D.     Lifestyle    Exercise at least 150 minutes a week (30 minutes a day, 5 days a week). This will help you control your weight and prevent disease.    Limit alcohol to one drink per day.    No smoking.     Wear sunscreen to prevent skin cancer.     See your dentist every six months for an exam and cleaning.    See your eye doctor every 1 to 2 years.    Preventive Health Recommendations  Female Ages 50 - 64    Yearly exam: See your health care provider every year in order to  o Review health changes.   o Discuss preventive  care.    o Review your medicines if your doctor has prescribed any.      Get a Pap test every three years (unless you have an abnormal result and your provider advises testing more often).    If you get Pap tests with HPV test, you only need to test every 5 years, unless you have an abnormal result.     You do not need a Pap test if your uterus was removed (hysterectomy) and you have not had cancer.    You should be tested each year for STDs (sexually transmitted diseases) if you're at risk.     Have a mammogram every 1 to 2 years.    Have a colonoscopy at age 50, or have a yearly FIT test (stool test). These exams screen for colon cancer.      Have a cholesterol test every 5 years, or more often if advised.    Have a diabetes test (fasting glucose) every three years. If you are at risk for diabetes, you should have this test more often.     If you are at risk for osteoporosis (brittle bone disease), think about having a bone density scan (DEXA).    Shots: Get a flu shot each year. Get a tetanus shot every 10 years.    Nutrition:     Eat at least 5 servings of fruits and vegetables each day.    Eat whole-grain bread, whole-wheat pasta and brown rice instead of white grains and rice.    Get adequate Calcium and Vitamin D.     Lifestyle    Exercise at least 150 minutes a week (30 minutes a day, 5 days a week). This will help you control your weight and prevent disease.    Limit alcohol to one drink per day.    No smoking.     Wear sunscreen to prevent skin cancer.     See your dentist every six months for an exam and cleaning.    See your eye doctor every 1 to 2 years.      Consider adding saline nasal spray or nasal flush for ongoing post-nasal drip  Continue the steroid nasal spray    Daily recommendations for calcium and vitamin D:  - Calcium 1200 mg daily (total for diet plus supplements)  - Vitamin D 800 international unit(s) daily (supplement preferred since it is hard to get enough vitamin D from our  environment in [place]).     You are due for a mammogram, which is a study to screen for breast cancer.  [facility] has several [facility] and also a walk-in mammogram service (no appointment needed!) at our [facility] location.  Below is the contact information.  Please complete the mammogram at your earliest convenience.  If you have any questions, call us at [facility] - [phone].    [facility] - [facility] walk-in mammogram services:  Address:   [facility] [address] [place] [id number]  Hours: M/Tu/Th 7:00AM-7:00PM, W/F 7:00AM-5:00PM    [facility]  [address]  [address]  [place] [address]  Appointments: [phone] or [phone]  Hours: M-F 7:00AM-5:00PM    [facility]  [address] [facility]  [address]  [place] [address]  Appointments: [phone] or [phone]

## 2019-08-27 NOTE — PROGRESS NOTES
tsh   SUBJECTIVE:   CC: Angelic Correa is an 64 year old woman who presents for preventive health visit.     Additional concerns: 1. Post nasal drip 2. Hep A vaccine-works at [employer] and there is a recent outbreak.    Healthy Habits:     Getting at least 3 servings of Calcium per day:  Yes    Bi-annual eye exam:  Yes    Dental care twice a year:  Yes    Sleep apnea or symptoms of sleep apnea:  None    Diet:  Regular (no restrictions)    Frequency of exercise:  2-3 days/week    Duration of exercise:  30-45 minutes    Taking medications regularly:  Yes    Barriers to taking medications:  None    Medication side effects:  None    PHQ-2 Total Score: 0    Additional concerns today:  No    She has constant post nasal drip  Using steroid nasal spray        Today's PHQ-2 Score:   PHQ-2 (  [employer]) 2019   Q1: Little interest or pleasure in doing things 0   Q2: Feeling down, depressed or hopeless 0   PHQ-2 Score 0   Q1: Little interest or pleasure in doing things Not at all   Q2: Feeling down, depressed or hopeless Not at all   PHQ-2 Score 0   Answers for HPI/ROS submitted by the patient on 2019   Annual Exam:  If you checked off any problems, how difficult have these problems made it for you to do your work, take care of things at home, or get along with other people?: Somewhat difficult  PHQ9 TOTAL SCORE: 2  SHIRLEY 7 TOTAL SCORE: 0      Abuse: Current or Past(Physical, Sexual or Emotional)- No  Do you feel safe in your environment? Yes    Social History     Tobacco Use     Smoking status: Former Smoker     Last attempt to quit: 1996     Years since quittin.6     Smokeless tobacco: Never Used   Substance Use Topics     Alcohol use: Yes     If you drink alcohol do you typically have >3 drinks per day or >7 drinks per week? No    AUDIT - Alcohol Use Disorders Identification Test - Reproduced from the World Health Organization Audit 2001 (Second Edition) 2019   1.  How often do you have a drink containing  alcohol? 4 or more times a week   2.  How many drinks containing alcohol do you have on a typical day when you are drinking? 1 or 2   3.  How often do you have five or more drinks on one occasion? Less than monthly   4.  How often during the last year have you found that you were not able to stop drinking once you had started? Never   5.  How often during the last year have you failed to do what was normally expected of you because of drinking? Never   6.  How often during the last year have you needed a first drink in the morning to get yourself going after a heavy drinking session? Never   7.  How often during the last year have you had a feeling of guilt or remorse after drinking? Never   8.  How often during the last year have you been unable to remember what happened the night before because of your drinking? Never   9.  Have you or someone else been injured because of your drinking? No   10. Has a relative, friend, doctor or other health care worker been concerned about your drinking or suggested you cut down? No   TOTAL SCORE 5       Reviewed orders with patient.  Reviewed health maintenance and updated orders accordingly - Yes  Patient Active Problem List   Diagnosis     Glaucoma suspect     Glaucoma suspect of both eyes     Senile nuclear sclerosis, bilateral     Presbyopia     Past Surgical History:   Procedure Laterality Date     LASER SURGERY OF EYE         Social History     Tobacco Use     Smoking status: Former Smoker     Last attempt to quit: 1996     Years since quittin.6     Smokeless tobacco: Never Used   Substance Use Topics     Alcohol use: Yes     Family History   Problem Relation Age of Onset     Glaucoma Mother      Alzheimer Disease Mother      Glaucoma Maternal Aunt      Thyroid Disease Maternal Aunt      Glaucoma Cousin      Parkinsonism Father      Coronary Artery Disease Father      Glaucoma Father      Thyroid Disease Sister      Macular Degeneration No family hx of       "    Current Outpatient Medications   Medication Sig Dispense Refill     clobetasol (TEMOVATE) 0.05 % external cream Apply topically 2 times daily as needed (eczema)       mometasone (NASONEX) 50 MCG/ACT nasal spray Spray 2 sprays into both nostrils daily       sertraline (ZOLOFT) 100 MG tablet Take 1.5 tablets (150 mg) by mouth daily 135 tablet 1     fluticasone (FLONASE) 50 MCG/ACT spray 1 spray       Allergies   Allergen Reactions     Apraclonidine Nausea and Vomiting     Bupropion GI Disturbance and Nausea     Generic only     No Clinical Screening - See Comments      seasonal, cosmetic     Pilocarpine Nausea and Vomiting     Sulfa Drugs Unknown       Mammogram Screening: Patient over age 50, mutual decision to screen reflected in health maintenance.    Pertinent mammograms are reviewed under the imaging tab.  History of abnormal Pap smear: Last 3 Pap and HPV Results:       Reviewed and updated as needed this visit by clinical staff  Tobacco  Allergies  Meds  Med Hx  Surg Hx  Fam Hx  Soc Hx        Reviewed and updated as needed this visit by Provider  Tobacco  Meds  Med Hx  Surg Hx  Fam Hx  Soc Hx           Review of Systems   Constitutional:        As in HPI   HENT:        As in HPI   Eyes: Negative.    Respiratory:        As in HPI   Cardiovascular: Negative.    Gastrointestinal: Negative.    Genitourinary: Negative.    Musculoskeletal: Negative.    Skin: Negative.    Neurological: Negative.    Psychiatric/Behavioral: Negative.           OBJECTIVE:   /88 (BP Location: Left arm, Patient Position: Sitting, Cuff Size: Adult Regular)   Pulse 76   Temp 98  F (36.7  C) (Tympanic)   Resp 16   Ht 1.695 m (5' 6.75\")   Wt 71.9 kg (158 lb 8 oz)   SpO2 97%   BMI 25.01 kg/m    Physical Exam   Constitutional: She is oriented to person, place, and time. She appears well-developed and well-nourished. No distress.   HENT:   Right Ear: Tympanic membrane and external ear normal.   Left Ear: Tympanic " membrane and external ear normal.   Nose: Nose normal.   Mouth/Throat: Oropharynx is clear and moist. No oropharyngeal exudate.   Eyes: Pupils are equal, round, and reactive to light. Conjunctivae are normal. Right eye exhibits no discharge. Left eye exhibits no discharge.   Neck: Neck supple. No tracheal deviation present. No thyromegaly present.   Cardiovascular: Normal rate, regular rhythm, S1 normal, S2 normal, normal heart sounds and normal pulses. Exam reveals no S3, no S4 and no friction rub.   No murmur heard.  Pulmonary/Chest: Effort normal and breath sounds normal. No respiratory distress. She has no wheezes. She has no rales. Right breast exhibits no mass, no nipple discharge and no tenderness. Left breast exhibits no mass, no nipple discharge and no tenderness.   Abdominal: Soft. She exhibits no mass. There is no hepatosplenomegaly. There is no tenderness.   Musculoskeletal: Normal range of motion. She exhibits no edema.   Lymphadenopathy:     She has no cervical adenopathy.   Neurological: She is alert and oriented to person, place, and time. She has normal strength and normal reflexes. She exhibits normal muscle tone.   Skin: Skin is warm and dry. No rash noted.   Psychiatric: She has a normal mood and affect. Judgment and thought content normal. Cognition and memory are normal.         Diagnostic Test Results:  No results found for this or any previous visit (from the past 24 hour(s)).    ASSESSMENT/PLAN:       ICD-10-CM    1. Routine general medical examination at a health care facility Z00.00 Lipid panel reflex to direct LDL Fasting     Basic metabolic panel     TSH with free T4 reflex   2. Encounter for screening mammogram for breast cancer Z12.31 MA Screen Bilateral w/Mino   3. Need for hepatitis A immunization Z23 HEPA VACCINE ADULT IM   4. Major depressive disorder in full remission, unspecified whether recurrent (H) F32.5 sertraline (ZOLOFT) 100 MG tablet        COUNSELING:  Reviewed preventive  "health counseling, as reflected in patient instructions    Estimated body mass index is 25.01 kg/m  as calculated from the following:    Height as of this encounter: 1.695 m (5' 6.75\").    Weight as of this encounter: 71.9 kg (158 lb 8 oz).         reports that she quit smoking about 23 years ago. She has never used smokeless tobacco.      Counseling Resources:  ATP IV Guidelines  Pooled Cohorts Equation Calculator  Breast Cancer Risk Calculator  FRAX Risk Assessment  ICSI Preventive Guidelines  Dietary Guidelines for Americans, 2010  USDA's MyPlate  ASA Prophylaxis  Lung CA Screening    Priscila Rodriguez PA-C  [facility]  "

## 2019-08-28 ASSESSMENT — ANXIETY QUESTIONNAIRES: GAD7 TOTAL SCORE: 0

## 2019-08-28 ASSESSMENT — PATIENT HEALTH QUESTIONNAIRE - PHQ9: SUM OF ALL RESPONSES TO PHQ QUESTIONS 1-9: 2

## 2019-09-25 ENCOUNTER — TELEPHONE (OUTPATIENT)
Dept: FAMILY MEDICINE | Facility: CLINIC | Age: 64
End: 2019-09-25

## 2019-09-25 ENCOUNTER — OFFICE VISIT (OUTPATIENT)
Dept: URGENT CARE | Facility: URGENT CARE | Age: 64
End: 2019-09-25
Payer: COMMERCIAL

## 2019-09-25 VITALS
OXYGEN SATURATION: 100 % | SYSTOLIC BLOOD PRESSURE: 160 MMHG | WEIGHT: 158 LBS | DIASTOLIC BLOOD PRESSURE: 98 MMHG | BODY MASS INDEX: 24.93 KG/M2 | RESPIRATION RATE: 16 BRPM | TEMPERATURE: 98.5 F | HEART RATE: 74 BPM

## 2019-09-25 DIAGNOSIS — R82.90 NONSPECIFIC FINDING ON EXAMINATION OF URINE: ICD-10-CM

## 2019-09-25 DIAGNOSIS — R30.0 DYSURIA: Primary | ICD-10-CM

## 2019-09-25 LAB
ALBUMIN UR-MCNC: NEGATIVE MG/DL
APPEARANCE UR: CLEAR
BILIRUB UR QL STRIP: ABNORMAL
COLOR UR AUTO: YELLOW
GLUCOSE UR STRIP-MCNC: NEGATIVE MG/DL
HGB UR QL STRIP: ABNORMAL
KETONES UR STRIP-MCNC: NEGATIVE MG/DL
LEUKOCYTE ESTERASE UR QL STRIP: ABNORMAL
MUCOUS THREADS #/AREA URNS LPF: PRESENT /LPF
NITRATE UR QL: NEGATIVE
PH UR STRIP: 6 PH (ref 5–7)
RBC #/AREA URNS AUTO: ABNORMAL /HPF
SOURCE: ABNORMAL
SP GR UR STRIP: 1.02 (ref 1–1.03)
UROBILINOGEN UR STRIP-ACNC: 0.2 EU/DL (ref 0.2–1)
WBC #/AREA URNS AUTO: ABNORMAL /HPF

## 2019-09-25 PROCEDURE — 99213 OFFICE O/P EST LOW 20 MIN: CPT | Performed by: PHYSICIAN ASSISTANT

## 2019-09-25 PROCEDURE — 87086 URINE CULTURE/COLONY COUNT: CPT | Performed by: PHYSICIAN ASSISTANT

## 2019-09-25 PROCEDURE — 81001 URINALYSIS AUTO W/SCOPE: CPT | Performed by: PHYSICIAN ASSISTANT

## 2019-09-25 RX ORDER — CIPROFLOXACIN 250 MG/1
250 TABLET, FILM COATED ORAL 2 TIMES DAILY
Qty: 14 TABLET | Refills: 0 | Status: SHIPPED | OUTPATIENT
Start: 2019-09-25 | End: 2019-10-02

## 2019-09-25 NOTE — TELEPHONE ENCOUNTER
Reason for call:  Patient reporting a symptom    Symptom or request:   Patient states she has early stages of UTI    Duration (how long have symptoms been present): just started   Patient states UTI's come on fast    Have you been treated for this before? Yes   Not by Mendez Rodriguez    Additional comments:   Patient is requesting a prescription be called to her pharmacy    She would also drop off a specimen if necessary     Patient states she has discussed her situation with Mendez Rodriguez    Phone Number patient can be reached at:  Home number on file [phone] (home)    Best Time:  anytime    Can we leave a detailed message on this number:  YES    Call taken on 9/25/2019 at 2:15 PM by SHAHRAM SILVA

## 2019-09-25 NOTE — TELEPHONE ENCOUNTER
Pt reports dysuria -urinary burning since this morning and lower abdominal pain. Denies fever, hematuria or back pain. She would like Rx today or can leave urine sample tomorrow morning. Advised she see UC today or do an e-visit. [facility] information given. Pt agreed to see UC today.

## 2019-09-26 ENCOUNTER — MYC MEDICAL ADVICE (OUTPATIENT)
Dept: OPHTHALMOLOGY | Facility: CLINIC | Age: 64
End: 2019-09-26

## 2019-09-26 LAB
BACTERIA SPEC CULT: NORMAL
SPECIMEN SOURCE: NORMAL

## 2019-10-03 NOTE — PROGRESS NOTES
SUBJECTIVE:   Angelic Correa is a 64 year old female who  presents today for a possible UTI. Symptoms of dysuria, urgency and frequency have been going on for 1day(s).  Hematuria no.  sudden onsetand mild.  There is no history of fever, chills, nausea or vomiting.   This patient does  have a history of urinary tract infections. Patient denies long duration, rigors, flank pain, temperature > 101 degrees F. and Vomiting, significant nausea or diarrhea      Past Medical History:   Diagnosis Date     Glaucoma      Allergies   Allergen Reactions     Apraclonidine Nausea and Vomiting     Bupropion GI Disturbance and Nausea     Generic only     No Clinical Screening - See Comments      seasonal, cosmetic     Pilocarpine Nausea and Vomiting     Sulfa Drugs Unknown     Social History     Tobacco Use     Smoking status: Former Smoker     Last attempt to quit: 1996     Years since quittin.7     Smokeless tobacco: Never Used   Substance Use Topics     Alcohol use: Yes     Family History   Problem Relation Age of Onset     Glaucoma Mother      Alzheimer Disease Mother      Glaucoma Maternal Aunt      Thyroid Disease Maternal Aunt      Glaucoma Cousin      Parkinsonism Father      Coronary Artery Disease Father      Glaucoma Father      Thyroid Disease Sister      Macular Degeneration No family hx of        ROS:   CONSTITUTIONAL:NEGATIVE for fever, chills, change in weight  INTEGUMENTARY/SKIN: NEGATIVE for worrisome rashes, moles or lesions  RESP:NEGATIVE for significant cough or SOB  CV: NEGATIVE for chest pain, palpitations or peripheral edema  GI: NEGATIVE for nausea, abdominal pain, heartburn, or change in bowel habits  : dysuria and frequency of urination  MUSCULOSKELETAL: NEGATIVE for significant arthralgias or myalgia  NEURO: NEGATIVE for weakness, dizziness or paresthesias    OBJECTIVE:  BP (!) 160/98 (BP Location: Left arm, Patient Position: Sitting, Cuff Size: Adult Regular)   Pulse 74   Temp 98.5  F (36.9   C) (Oral)   Resp 16   Wt 71.7 kg (158 lb)   SpO2 100%   BMI 24.93 kg/m    GENERAL APPEARANCE: healthy, alert and no distress  HENT: TM's normal bilaterally  NECK: no adenopathy  RESP: lungs clear to auscultation - no rales, rhonchi or wheezes  CV: regular rates and rhythm, normal S1 S2, no murmur noted  ABDOMEN:  soft, nontender, no HSM or masses and bowel sounds normal  BACK: No CVA tenderness  SKIN: no suspicious lesions or rashes    Results for orders placed or performed in visit on 09/25/19   UA with Microscopic reflex to Culture   Result Value Ref Range    Color Urine Yellow     Appearance Urine Clear     Glucose Urine Negative NEG^Negative mg/dL    Bilirubin Urine Small (A) NEG^Negative    Ketones Urine Negative NEG^Negative mg/dL    Specific Gravity Urine 1.025 1.003 - 1.035    pH Urine 6.0 5.0 - 7.0 pH    Protein Albumin Urine Negative NEG^Negative mg/dL    Urobilinogen Urine 0.2 0.2 - 1.0 EU/dL    Nitrite Urine Negative NEG^Negative    Blood Urine Large (A) NEG^Negative    Leukocyte Esterase Urine Small (A) NEG^Negative    Source Midstream Urine     WBC Urine 25-50 (A) OTO5^0 - 5 /HPF    RBC Urine 10-25 (A) OTO2^O - 2 /HPF    Mucous Urine Present (A) NEG^Negative /LPF       ASSESSMENT/PLAN      ICD-10-CM    1. Dysuria R30.0 UA with Microscopic reflex to Culture     ciprofloxacin (CIPRO) 250 MG tablet   2. Nonspecific finding on examination of urine R82.90 Urine Culture Aerobic Bacterial     ciprofloxacin (CIPRO) 250 MG tablet       Lower, uncomplicated urinary tract infection.    PLAN:  Orders Placed This Encounter     UA with Microscopic reflex to Culture     ciprofloxacin (CIPRO) 250 MG tablet       Urine culture pending  Drink plenty of fluids.  Prevention and treatment of UTI's discussed.Signs and symptoms of pyelonephritis mentioned.  Follow up with primary care physician if not improving

## 2019-11-07 ENCOUNTER — HEALTH MAINTENANCE LETTER (OUTPATIENT)
Age: 64
End: 2019-11-07

## 2019-12-04 ENCOUNTER — E-VISIT (OUTPATIENT)
Dept: FAMILY MEDICINE | Facility: CLINIC | Age: 64
End: 2019-12-04
Payer: COMMERCIAL

## 2019-12-04 DIAGNOSIS — B00.9 HSV (HERPES SIMPLEX VIRUS) INFECTION: Primary | ICD-10-CM

## 2019-12-04 PROCEDURE — 99444 ZZC PHYSICIAN ONLINE EVALUATION & MANAGEMENT SERVICE: CPT | Performed by: PHYSICIAN ASSISTANT

## 2019-12-04 RX ORDER — VALACYCLOVIR HYDROCHLORIDE 500 MG/1
500 TABLET, FILM COATED ORAL 2 TIMES DAILY
Qty: 6 TABLET | Refills: 3 | Status: SHIPPED | OUTPATIENT
Start: 2019-12-04 | End: 2020-03-18

## 2020-01-21 DIAGNOSIS — L30.9 ECZEMA, UNSPECIFIED TYPE: Primary | ICD-10-CM

## 2020-01-21 RX ORDER — CLOBETASOL PROPIONATE 0.5 MG/G
CREAM TOPICAL 2 TIMES DAILY PRN
Qty: 15 G | Refills: 3 | Status: SHIPPED | OUTPATIENT
Start: 2020-01-21 | End: 2021-06-18

## 2020-01-21 NOTE — TELEPHONE ENCOUNTER
clobetasol (TEMOVATE) 0.05 % external cream     Last Written Prescription Date:  na  Last Fill Quantity: na,  # refills: na   Last office visit: 8/27/2019 with prescribing provider:  Fitterer   Future Office Visit:      Routing refill request to provider for review/approval because:  Drug not on the [facility], [facility] or [facility] refill protocol or controlled substance

## 2020-01-24 ENCOUNTER — TELEPHONE (OUTPATIENT)
Dept: FAMILY MEDICINE | Facility: CLINIC | Age: 65
End: 2020-01-24

## 2020-01-24 DIAGNOSIS — L30.9 ECZEMA, UNSPECIFIED TYPE: Primary | ICD-10-CM

## 2020-01-24 NOTE — TELEPHONE ENCOUNTER
Prior Authorization Retail Medication Request    Medication/Dose: clobetasol (TEMOVATE) 0.05 % external cream   ICD code (if different than what is on RX):     Previously Tried and Failed:     Rationale:       Insurance Name:     Insurance ID:         Pharmacy Information (if different than what is on RX)  Name:     Phone:

## 2020-01-28 NOTE — TELEPHONE ENCOUNTER
Central Prior Authorization Team   Phone: [phone]      PA Initiation    Medication: clobetasol (TEMOVATE) 0.05 % external cream  Insurance Company: [employer] - Phone [phone] Fax [phone]  Pharmacy Filling the Rx: [id number] - [place], [place] - [address]  Filling Pharmacy Phone: [phone]  Filling Pharmacy Fax:    Start Date: 1/28/2020

## 2020-01-29 NOTE — TELEPHONE ENCOUNTER
PRIOR AUTHORIZATION DENIED    Medication: clobetasol (TEMOVATE) 0.05 % external cream    Denial Date: 1/29/2020    Denial Rational:      Appeal Information:    If you would like to appeal, please supply P/A team with a letter of medical necessity with clinical reason.

## 2020-01-30 RX ORDER — BETAMETHASONE DIPROPIONATE 0.5 MG/G
CREAM TOPICAL 2 TIMES DAILY
Qty: 50 G | Refills: 1 | Status: SHIPPED | OUTPATIENT
Start: 2020-01-30 | End: 2021-06-18

## 2020-01-30 NOTE — TELEPHONE ENCOUNTER
I will send an alternative medication and see if that is covered.  It is also cheaper.     Mendez Rodriguez PA-C

## 2020-03-17 DIAGNOSIS — B00.9 HSV (HERPES SIMPLEX VIRUS) INFECTION: ICD-10-CM

## 2020-03-17 NOTE — TELEPHONE ENCOUNTER
"Requested Prescriptions   Pending Prescriptions Disp Refills     valACYclovir (VALTREX) 500 MG tablet  Last Written Prescription Date:  12/4/2019  Last Fill Quantity: 6 tablet,  # refills: 3   Last office visit: 8/27/2019 with prescribing provider:  Michael   Future Office Visit:     6 tablet 3     Sig: Take 1 tablet (500 mg) by mouth 2 times daily       Antivirals for Herpes Protocol Failed - 3/17/2020 11:59 AM        Failed - Normal serum creatinine on file in past 12 months     No lab results found.    Ok to refill medication if creatinine is low          Passed - Patient is age 12 or older        Passed - Recent (12 mo) or future (30 days) visit within the authorizing provider's specialty     Patient has had an office visit with the authorizing provider or a provider within the authorizing providers department within the previous 12 mos or has a future within next 30 days. See \"Patient Info\" tab in inbasket, or \"Choose Columns\" in Meds & Orders section of the refill encounter.              Passed - Medication is active on med list              "

## 2020-03-18 ENCOUNTER — TELEPHONE (OUTPATIENT)
Dept: FAMILY MEDICINE | Facility: CLINIC | Age: 65
End: 2020-03-18

## 2020-03-18 DIAGNOSIS — R06.2 WHEEZING: Primary | ICD-10-CM

## 2020-03-18 RX ORDER — VALACYCLOVIR HYDROCHLORIDE 500 MG/1
500 TABLET, FILM COATED ORAL 2 TIMES DAILY
Qty: 6 TABLET | Refills: 3 | Status: SHIPPED | OUTPATIENT
Start: 2020-03-18 | End: 2020-07-07

## 2020-03-18 RX ORDER — ALBUTEROL SULFATE 90 UG/1
2 AEROSOL, METERED RESPIRATORY (INHALATION) EVERY 6 HOURS PRN
Qty: 1 INHALER | Refills: 0 | Status: SHIPPED | OUTPATIENT
Start: 2020-03-18 | End: 2021-06-18

## 2020-03-18 NOTE — TELEPHONE ENCOUNTER
Reason for Call:  Medication or medication refill:    Do you use a [facility]?  Name of the pharmacy and phone number for the current request:  [id number] - [place], [place] - [address]      Name of the medication requested: Inhaler     Other request: The patient called and stated that she went to  on 3/2 and was diagnosed with bronchitis.  The patient stated that she was prescribed Z-carmela and she is still experiencing a cough.  She is wondering if Mendez Rodriguez would be willing to prescriber her an inhaler that can help her feel better.     Can we leave a detailed message on this number? YES    Phone number patient can be reached at: Home number on file [phone] (home)    Best Time: Any    Call taken on 3/18/2020 at 11:36 AM by Fabby García

## 2020-03-18 NOTE — TELEPHONE ENCOUNTER
"Pt was seen 03/02/2020 at  [facility] requesting rx for inhailer. She denies chest pain but admits wheezing and low grade fever 99.5. States \"my left bronchi has not cleared up\". Notes she is coughing mostly in the morning. Denies other new symptoms. Please advice. Should she schedule a phone or e-visit for Rx?  "

## 2020-04-01 DIAGNOSIS — F32.5 MAJOR DEPRESSIVE DISORDER IN FULL REMISSION, UNSPECIFIED WHETHER RECURRENT (H): ICD-10-CM

## 2020-04-01 RX ORDER — SERTRALINE HYDROCHLORIDE 100 MG/1
150 TABLET, FILM COATED ORAL DAILY
Qty: 135 TABLET | Refills: 1 | Status: SHIPPED | OUTPATIENT
Start: 2020-04-01 | End: 2020-10-14

## 2020-04-01 RX ORDER — SERTRALINE HYDROCHLORIDE 100 MG/1
150 TABLET, FILM COATED ORAL DAILY
Qty: 135 TABLET | Refills: 1 | Status: CANCELLED | OUTPATIENT
Start: 2020-04-01

## 2020-04-01 NOTE — TELEPHONE ENCOUNTER
"Requested Prescriptions   Pending Prescriptions Disp Refills     sertraline (ZOLOFT) 100 MG tablet 135 tablet 1     Sig: Take 1.5 tablets (150 mg) by mouth daily   Last Written Prescription Date:  08/27/19  Last Fill Quantity: 135,  # refills: 1   Last office visit: 8/27/2019 with prescribing provider:  Michael   Future Office Visit:        [facility] Protocol Failed - 4/1/2020 12:22 PM        Failed - PHQ-9 score less than 5 in past 6 months     PHQ 8/27/2019   PHQ-9 Total Score 2   Q9: Thoughts of better off dead/self-harm past 2 weeks Not at all             Failed - Recent (6 mo) or future (30 days) visit within the authorizing provider's specialty     Patient had office visit in the last 6 months or has a visit in the next 30 days with authorizing provider or within the authorizing provider's specialty.  See \"Patient Info\" tab in inbasket, or \"Choose Columns\" in Meds & Orders section of the refill encounter.            Passed - Medication is active on med list        Passed - Patient is age 18 or older        Passed - No active pregnancy on record        Passed - No positive pregnancy test in last 12 months             "

## 2020-04-01 NOTE — TELEPHONE ENCOUNTER
Routing refill request to provider for review/approval because:  PHQ-9 out of date  No visit in the past 6 mo    Do you want e-visit or phone visit?

## 2020-07-03 ENCOUNTER — HOSPITAL ENCOUNTER (OUTPATIENT)
Dept: MAMMOGRAPHY | Facility: CLINIC | Age: 65
Discharge: HOME | End: 2020-07-03
Attending: PHYSICIAN ASSISTANT | Admitting: PHYSICIAN ASSISTANT
Payer: COMMERCIAL

## 2020-07-03 DIAGNOSIS — Z12.31 ENCOUNTER FOR SCREENING MAMMOGRAM FOR BREAST CANCER: ICD-10-CM

## 2020-07-03 PROCEDURE — 77063 BREAST TOMOSYNTHESIS BI: CPT

## 2020-07-07 ENCOUNTER — VIRTUAL VISIT (OUTPATIENT)
Dept: FAMILY MEDICINE | Facility: CLINIC | Age: 65
End: 2020-07-07
Payer: COMMERCIAL

## 2020-07-07 DIAGNOSIS — L01.00 IMPETIGO: Primary | ICD-10-CM

## 2020-07-07 DIAGNOSIS — B00.9 HSV (HERPES SIMPLEX VIRUS) INFECTION: ICD-10-CM

## 2020-07-07 PROCEDURE — 99214 OFFICE O/P EST MOD 30 MIN: CPT | Mod: 95 | Performed by: PHYSICIAN ASSISTANT

## 2020-07-07 RX ORDER — VALACYCLOVIR HYDROCHLORIDE 500 MG/1
500 TABLET, FILM COATED ORAL 2 TIMES DAILY
Qty: 6 TABLET | Refills: 3 | Status: SHIPPED | OUTPATIENT
Start: 2020-07-07 | End: 2020-10-29

## 2020-07-07 RX ORDER — MUPIROCIN 20 MG/G
OINTMENT TOPICAL 3 TIMES DAILY
Qty: 22 G | Refills: 0 | Status: SHIPPED | OUTPATIENT
Start: 2020-07-07 | End: 2020-07-17 | Stop reason: SINTOL

## 2020-07-07 ASSESSMENT — ENCOUNTER SYMPTOMS
CONSTITUTIONAL NEGATIVE: 1
RESPIRATORY NEGATIVE: 1
ROS SKIN COMMENTS: AS IN HPI
EYES NEGATIVE: 1
MUSCULOSKELETAL NEGATIVE: 1
GASTROINTESTINAL NEGATIVE: 1
CARDIOVASCULAR NEGATIVE: 1
NEUROLOGICAL NEGATIVE: 1
PSYCHIATRIC NEGATIVE: 1

## 2020-07-07 NOTE — PROGRESS NOTES
"PLAN:    IF NOT IMPROVING IN TWO WEEKS, PATIENT WILL CALL TO FILL ANTIBIOTIC  AFTER 7/20/20, ok to fill:  Cephalexin 250 mg   Take one tab 4 times a day for 7 days  28 tablets, no refill        Angelic Correa is a 65 year old female who is being evaluated via a billable video visit.      The patient has been notified of following:     \"This video visit will be conducted via a call between you and your physician/provider. We have found that certain health care needs can be provided without the need for an in-person physical exam.  This service lets us provide the care you need with a video conversation.  If a prescription is necessary we can send it directly to your pharmacy.  If lab work is needed we can place an order for that and you can then stop by our lab to have the test done at a later time.    Video visits are billed at different rates depending on your insurance coverage.  Please reach out to your insurance provider with any questions.    If during the course of the call the physician/provider feels a video visit is not appropriate, you will not be charged for this service.\"    Patient has given verbal consent for Video visit? Yes  How would you like to obtain your AVS? Mail a copy  Patient would like the video invitation sent by: Text to cell phone: [phone]  Will anyone else be joining your video visit? No      Subjective     Angelic Correa is a 65 year old female who presents today via video visit for the following health issues:    HPI  Rash      Duration: 4 months    Description  Location: nasal folds, chin  Itching: no    Intensity:  moderate    Accompanying signs and symptoms: pain    History (similar episodes/previous evaluation): yes, similar rash in the past    Precipitating or alleviating factors:  New exposures:  mask  Recent travel: no      Therapies tried and outcome: topical steroid - clobetasol (no improvement)        Video Start Time: 8:48 AM        Patient Active Problem List   Diagnosis     " Glaucoma suspect     Glaucoma suspect of both eyes     Senile nuclear sclerosis, bilateral     Presbyopia     HSV (herpes simplex virus) infection     Past Surgical History:   Procedure Laterality Date     LASER SURGERY OF EYE         Social History     Tobacco Use     Smoking status: Former Smoker     Last attempt to quit: 1996     Years since quittin.5     Smokeless tobacco: Never Used   Substance Use Topics     Alcohol use: Yes     Family History   Problem Relation Age of Onset     Glaucoma Mother      Alzheimer Disease Mother      Glaucoma Maternal Aunt      Thyroid Disease Maternal Aunt      Glaucoma Cousin      Parkinsonism Father      Coronary Artery Disease Father      Glaucoma Father      Thyroid Disease Sister      Macular Degeneration No family hx of          Current Outpatient Medications   Medication Sig Dispense Refill     mupirocin (BACTROBAN) 2 % external ointment Apply topically 3 times daily 22 g 0     valACYclovir (VALTREX) 500 MG tablet Take 1 tablet (500 mg) by mouth 2 times daily 6 tablet 3     albuterol (PROAIR HFA/PROVENTIL HFA/VENTOLIN HFA) 108 (90 Base) MCG/ACT inhaler Inhale 2 puffs into the lungs every 6 hours as needed for shortness of breath / dyspnea or wheezing 1 Inhaler 0     augmented betamethasone dipropionate (DIPROLENE-AF) 0.05 % external cream Apply topically 2 times daily 50 g 1     clobetasol (TEMOVATE) 0.05 % external cream Apply topically 2 times daily as needed (eczema) 15 g 3     fluticasone (FLONASE) 50 MCG/ACT spray 1 spray       mometasone (NASONEX) 50 MCG/ACT nasal spray Spray 2 sprays into both nostrils daily       sertraline (ZOLOFT) 100 MG tablet Take 1.5 tablets (150 mg) by mouth daily 135 tablet 1     Allergies   Allergen Reactions     Apraclonidine Nausea and Vomiting     Bupropion GI Disturbance and Nausea     Generic only     No Clinical Screening - See Comments      seasonal, cosmetic     Pilocarpine Nausea and Vomiting     Sulfa Drugs Unknown  "      Reviewed and updated as needed this visit by Provider         Review of Systems   Constitutional: Negative.    HENT: Negative.    Eyes: Negative.    Respiratory: Negative.    Cardiovascular: Negative.    Gastrointestinal: Negative.    Genitourinary: Negative.    Musculoskeletal: Negative.    Skin:        As in HPI   Neurological: Negative.    Psychiatric/Behavioral: Negative.             Objective             Physical Exam     GENERAL: Healthy, alert and no distress  RESP: no wheezes and no cough  PSYCH: Mentation appears normal, affect normal/bright, judgement and insight intact, normal speech      Diagnostic Test Results:  none         Assessment & Plan   Problem List Items Addressed This Visit        Infectious/Inflammatory    HSV (herpes simplex virus) infection    Relevant Medications    valACYclovir (VALTREX) 500 MG tablet      Other Visit Diagnoses     Impetigo    -  Primary    Relevant Medications    mupirocin (BACTROBAN) 2 % external ointment         Switch to mupirocin  Refill valtrex    IF NOT IMPROVING IN TWO WEEKS, PATIENT WILL CALL TO FILL ANTIBIOTIC  AFTER 7/20/20, ok to fill:  Cephalexin 250 mg   Take one tab 4 times a day for 7 days  28 tablets, no refill    BMI:   Estimated body mass index is 24.93 kg/m  as calculated from the following:    Height as of 8/27/19: 1.695 m (5' 6.75\").    Weight as of 9/25/19: 71.7 kg (158 lb).               Return in about 2 months (around 9/7/2020) for Preventive Care Visit with Pap.    Priscila Rodriguez PA-C  [facility]            Video-Visit Details    Type of service:  Video Visit    Video End Time:8:54 AM    Originating Location (pt. Location): Home    Distant Location (provider location):  [facility]     Platform used for Video Visit: Doximity    Return in about 2 months (around 9/7/2020) for Preventive Care Visit with Pap.       Mendez Rodriguez PA-C          "

## 2020-07-17 ENCOUNTER — MYC MEDICAL ADVICE (OUTPATIENT)
Dept: FAMILY MEDICINE | Facility: CLINIC | Age: 65
End: 2020-07-17

## 2020-07-17 ENCOUNTER — TELEPHONE (OUTPATIENT)
Dept: FAMILY MEDICINE | Facility: CLINIC | Age: 65
End: 2020-07-17

## 2020-07-17 DIAGNOSIS — L01.00 IMPETIGO: Primary | ICD-10-CM

## 2020-07-17 RX ORDER — MUPIROCIN 20 MG/G
OINTMENT TOPICAL 3 TIMES DAILY
Qty: 15 G | Refills: 0 | OUTPATIENT
Start: 2020-07-17

## 2020-07-17 NOTE — TELEPHONE ENCOUNTER
PA needed for: retapamulin (ALTABLAX) 1 % external ointment     Prior Authorization Specialty Medication Request    Needs medication ASAP due to impetigo     Medication/Dose: retapamulin (ALTABLAX) 1 % external ointment   ICD code (if different than what is on RX):    Previously Tried and Failed:      Important Lab Values:   Rationale:     Insurance Name:   Insurance ID:   Insurance Phone Number:     Pharmacy Information (if different than what is on RX)  Name:    Phone:

## 2020-07-17 NOTE — TELEPHONE ENCOUNTER
Patient developed contact dermatitis with Bactroban, alternate denied due to allergic reaction. Please have pharmacy start PA if needed.

## 2020-07-17 NOTE — TELEPHONE ENCOUNTER
Central Prior Authorization Team   Phone: [phone]      PA Initiation    Medication: retapamulin (ALTABLAX) 1 % external ointment   Insurance Company: [facility] [employer] [facility] - Phone [phone] Fax [phone]  Pharmacy Filling the Rx: [id number] [place], [place] - [address]  Filling Pharmacy Phone: [phone]  Filling Pharmacy Fax:    Start Date: 7/17/2020    Initiated PA over the phone.

## 2020-07-17 NOTE — TELEPHONE ENCOUNTER
Prior Authorization Retail Medication Request    Medication/Dose: retapamulin (ALTABLAX) 1 % external ointment   ICD code (if different than what is on RX):     Previously Tried and Failed:     Rationale:       Insurance Name:     Insurance ID:         Pharmacy Information (if different than what is on RX)  Name:     Phone:      [facility] KEY: [id number]

## 2020-07-17 NOTE — TELEPHONE ENCOUNTER
Reason for Call:  Other call back    Detailed comments:     [id number] - [place], [place] - [address]    Pharmacy called needing either a replacement or a prior authorization for:     retapamulin (ALTABLAX) 1 % external ointment   It is not covered under insurance.      Pharmacy replacement suggestion:  [facility]    Phone Number Patient can be reached at: n/a  [facility]: [phone]    Best Time: any    Can we leave a detailed message on this number? YES    Call taken on 7/17/2020 at 1:54 PM by Gretel Gamez

## 2020-07-20 RX ORDER — DOXYCYCLINE 100 MG/1
100 CAPSULE ORAL 2 TIMES DAILY
Qty: 20 CAPSULE | Refills: 0 | Status: SHIPPED | OUTPATIENT
Start: 2020-07-20 | End: 2020-07-30

## 2020-07-20 NOTE — TELEPHONE ENCOUNTER
We will switch to an oral medication instead.   I have sent antibiotics.     Mendez Rodriguez PA-C

## 2020-07-20 NOTE — TELEPHONE ENCOUNTER
Central Prior Authorization Team   Phone: [phone]      PA -PLAN EXCLUSION    Medication: altabax  Insurance Company: [employer] - Phone [phone] Fax [phone]  Pharmacy Filling the Rx: [id number] [address]  Filling Pharmacy Phone: [phone]  Filling Pharmacy Fax:    Start Date: 7/20/2020    Started PA on CMM and a response of No eligibility was found. Called and spoke with John at [employer] and he shows that the patient does have coverage and the medication is a plan exclusion.

## 2020-07-20 NOTE — TELEPHONE ENCOUNTER
Called pt and let her know that unfortunately this medication is excluded. Is there another medication pt can be prescribed that is similar?

## 2020-07-27 NOTE — TELEPHONE ENCOUNTER
PRIOR AUTHORIZATION DENIED    Medication: retapamulin (ALTABLAX) 1 % external ointment     Denial Date: 7/25/2020    Denial Rational:      Appeal Information:    If you would like to appeal, please supply P/A team with a letter of medical necessity with clinical reason.

## 2020-09-15 ENCOUNTER — OFFICE VISIT (OUTPATIENT)
Dept: FAMILY MEDICINE | Facility: CLINIC | Age: 65
End: 2020-09-15
Payer: COMMERCIAL

## 2020-09-15 ENCOUNTER — NURSE TRIAGE (OUTPATIENT)
Dept: FAMILY MEDICINE | Facility: CLINIC | Age: 65
End: 2020-09-15

## 2020-09-15 VITALS
HEART RATE: 66 BPM | RESPIRATION RATE: 12 BRPM | DIASTOLIC BLOOD PRESSURE: 98 MMHG | SYSTOLIC BLOOD PRESSURE: 136 MMHG | TEMPERATURE: 97 F | OXYGEN SATURATION: 98 % | BODY MASS INDEX: 25.09 KG/M2 | WEIGHT: 159 LBS

## 2020-09-15 DIAGNOSIS — R11.2 NON-INTRACTABLE VOMITING WITH NAUSEA, UNSPECIFIED VOMITING TYPE: ICD-10-CM

## 2020-09-15 DIAGNOSIS — H81.10 BENIGN PAROXYSMAL POSITIONAL VERTIGO, UNSPECIFIED LATERALITY: Primary | ICD-10-CM

## 2020-09-15 PROCEDURE — 99213 OFFICE O/P EST LOW 20 MIN: CPT | Performed by: FAMILY MEDICINE

## 2020-09-15 RX ORDER — ONDANSETRON 4 MG/1
4 TABLET, ORALLY DISINTEGRATING ORAL EVERY 8 HOURS PRN
Qty: 20 TABLET | Refills: 0 | Status: SHIPPED | OUTPATIENT
Start: 2020-09-15 | End: 2020-09-15

## 2020-09-15 RX ORDER — ONDANSETRON 4 MG/1
4 TABLET, ORALLY DISINTEGRATING ORAL EVERY 6 HOURS PRN
Qty: 20 TABLET | Refills: 0 | Status: SHIPPED | OUTPATIENT
Start: 2020-09-15 | End: 2022-01-10

## 2020-09-15 RX ORDER — NEOMYCIN SULFATE, POLYMYXIN B SULFATE AND HYDROCORTISONE 10; 3.5; 1 MG/ML; MG/ML; [USP'U]/ML
3 SUSPENSION/ DROPS AURICULAR (OTIC) 4 TIMES DAILY
Qty: 10 ML | Refills: 0 | Status: CANCELLED | OUTPATIENT
Start: 2020-09-15

## 2020-09-15 ASSESSMENT — PATIENT HEALTH QUESTIONNAIRE - PHQ9: SUM OF ALL RESPONSES TO PHQ QUESTIONS 1-9: 2

## 2020-09-15 NOTE — TELEPHONE ENCOUNTER
Call from patient who reports new onset of dizziness. She states it started yesterday when she woke up. She drank her coffee and it came on suddenly. She laid down to rest and took sudafed at recommendation of sister. She states it made her feel better.     She felt fine this morning, but was at the store bending/lifting items and it came on suddenly. Starts with lightheadedness, then nausea and sweating, then vomiting. She does note feeling cold/clammy after. She states the face masks don't help her to feel better. States the lightheadedness is not room-spinning sensation. She has been trying to stay hydrated. Denies any irregular heart rhythms or chest pain. She notes some shoulder tenderness which she states is related to recent massage.     Patient states her sister, who has been working from her house, was exposed to covid/had symptoms of severe headache. She tested negative for COVID but still believes she had it. Patient denies any symptoms of fever, cough, SOB, rash.    She notes an ear problem recently - she tried to rinse it out in the shower today and put hydrogen peroxide and oregano oil in her ear. She states this helped, but then two hours later it got worse.     Discussed appt with Dr. Rebollar - approved for in-clinic visit. No further action needed.    Patient Contact    Attempt # 1    Was call answered?  No.  Left message to confirm in-person appointment today. No call back needed unless she has questions.      Additional Information    Negative: Shock suspected (e.g., cold/pale/clammy skin, too weak to stand, low BP, rapid pulse)    Negative: Difficult to awaken or acting confused (e.g., disoriented, slurred speech)    Negative: Fainted, and still feels dizzy afterwards    Negative: Severe difficulty breathing (e.g., struggling for each breath, speaks in single words)    Negative: Overdose (accidental or intentional) of medications    Negative: New neurologic deficit that is present now: * Weakness  of the face, arm, or leg on one side of the body * Numbness of the face, arm, or leg on one side of the body * Loss of speech or garbled speech    Negative: Heart beating < 50 beats per minute OR > 140 beats per minute    Negative: Sounds like a life-threatening emergency to the triager    Negative: Chest pain    Negative: Rectal bleeding, bloody stool, or tarry-black stool    Negative: Vomiting is the main symptom    Negative: Diarrhea is the main symptom    Negative: Headache is the main symptom    Negative: Heat exhaustion suspected (i.e., dehydration from heat exposure)    Negative: Patient states that he/she is having an anxiety/panic attack    Negative: SEVERE dizziness (e.g., unable to stand, requires support to walk, feels like passing out now)    Negative: SEVERE headache or neck pain    Negative: Spinning or tilting sensation (vertigo) present now and one or more stroke risk factors (i.e., hypertension, diabetes, prior stroke/TIA, heart attack, age over 60) (Exception: prior physician evaluation for this AND no different/worse than usual)    Negative: Loss of vision or double vision    Negative: Extra heart beats OR irregular heart beating (i.e., 'palpitations')    Negative: Difficulty breathing    Negative: Drinking very little and has signs of dehydration (e.g., no urine > 12 hours, very dry mouth, very lightheaded)    Negative: Follows bleeding (e.g., stomach, rectum, vagina) (Exception: became dizzy from sight of small amount blood)    Negative: Patient sounds very sick or weak to the triager    Negative: Lightheadedness (dizziness) present now, after 2 hours of rest and fluids    Negative: Spinning or tilting sensation (vertigo) present now    Negative: Fever > 103 F (39.4 C)    Negative: Fever > 100.0 F (37.8 C) and has diabetes mellitus or a weak immune system (e.g., HIV positive, cancer chemotherapy, organ transplant, splenectomy, chronic steroids)    Vomiting occurs with dizziness    Answer  "Assessment - Initial Assessment Questions  1. DESCRIPTION: \"Describe your dizziness.\"    Yesterday, got up and felt 'a little off'. Had coffee, checked e-mail, and 'came on really fast'. Vomited. Laid down to rest and took sudafed to see if it was congestion. Today she finished shopping on way to work and 'lost it in the parking lot'.         States it feels like a 'stomach flu'. Felt like she was sweating profusely and lightheaded. Does not feel like the world is spinning.     2. LIGHTHEADED: \"Do you feel lightheaded?\" (e.g., somewhat faint, woozy, weak upon standing)    Yes - faint, woozy, weak. Sweating.         3. VERTIGO: \"Do you feel like either you or the room is spinning or tilting?\" (i.e. vertigo)    NO        4. SEVERITY: \"How bad is it?\"  \"Do you feel like you are going to faint?\" \"Can you stand and walk?\"    - MILD - walking normally    - MODERATE - interferes with normal activities (e.g., work, school)     - SEVERE - unable to stand, requires support to walk, feels like passing out now.     NO falling, losing consciousness.       Able to walk when it happens.     5. ONSET:  \"When did the dizziness begin?\"    Feels lightheaded, then nauseous, then vomit. Almost feels like car sickness.         6. AGGRAVATING FACTORS: \"Does anything make it worse?\" (e.g., standing, change in head position)    In car with A/C which helps - feels better.         7. HEART RATE: \"Can you tell me your heart rate?\" \"How many beats in 15 seconds?\"  (Note: not all patients can do this)      NO        8. CAUSE: \"What do you think is causing the dizziness?\"    Ear problem - today rinsed it out in the shower and put hydrogen peroxide and oregano oil. Then two hours later it got worse. She was in the store, lifting/bending when lightheadedness came on.        9. RECURRENT SYMPTOM: \"Have you had dizziness before?\" If so, ask: \"When was the last time?\" \"What happened that time?\"    Years ago - at ophthalmologist. He gave a medication " "and she had similar symptoms.        10. OTHER SYMPTOMS: \"Do you have any other symptoms?\" (e.g., fever, chest pain, vomiting, diarrhea, bleeding)    Vomiting.   Denies chest pain, shoulder pain, jaw pain. Had massage last week.   Denies fever.   No diarrhea or bleeding.          11. PREGNANCY: \"Is there any chance you are pregnant?\" \"When was your last menstrual period?\"        *No Answer*      No recent medication changes. Her sister was exposed to COVID a few weeks ago - AND had symptoms. Has been working out of her house. She was tested and was negative, but believes it was a false negative. Patient is not having any of those symptoms.    Protocols used: DIZZINESS-A-OH      "

## 2020-09-15 NOTE — PROGRESS NOTES
Subjective     Angelic Correa is a 65 year old female who presents to clinic today for the following health issues:    HPI       Dizziness  Onset/Duration: yesterday morning  Description:   Do you feel faint: YES  Does it feel like the surroundings (bed, room) are moving: YES  Unsteady/off balance: YES  Have you passed out or fallen: no  Intensity: moderate  Progression of Symptoms: intermittent  Accompanying Signs & Symptoms:  Heart palpitations or chest pain: no  Nausea, vomiting: YES- both  Weakness or lack of coordination in arms or legs: YES  Vision or speech changes: no  Numbness or tingling: YES  Ringing in ears (Tinnitus): no  Hearing Loss: no  History:   Head trauma/concussion history: no  Previous similar symptoms: no  Recent bleeding history: no  Any new medications (BP?): chromium supplement but stopped  Precipitating factors:   Worse with activity: no  Worse with head movement: no  Alleviating factors:   Does staying in a fixed position give relief: YES  Therapies tried and outcome: sudafed around 1 pm- feeling better now    Started taking Sudafed yesterday.    Review of Systems   CONSTITUTIONAL: NEGATIVE for fever, chills, change in weight  INTEGUMENTARY/SKIN: NEGATIVE for worrisome rashes, moles or lesions  EYES: NEGATIVE for vision changes or irritation  RESP: NEGATIVE for significant cough or SOB  CV: NEGATIVE for chest pain, palpitations or peripheral edema  GI: NEGATIVE for nausea, abdominal pain, heartburn, or change in bowel habits  : NEGATIVE for frequency, dysuria, or hematuria  MUSCULOSKELETAL: NEGATIVE for significant arthralgias or myalgia  HEME: NEGATIVE for bleeding problems      Objective    BP (!) 136/98   Pulse 66   Temp 97  F (36.1  C) (Tympanic)   Resp 12   Wt 72.1 kg (159 lb)   SpO2 98%   BMI 25.09 kg/m    Body mass index is 25.09 kg/m .  Physical Exam   GENERAL: healthy, alert and no distress  EYES: Eyes grossly normal to inspection, PERRL and conjunctivae and sclerae  normal  HENT: normal cephalic/atraumatic, ear canals and TM's normal and wearing face mask  NECK: no adenopathy, no asymmetry, masses, or scars and thyroid normal to palpation  RESP: lungs clear to auscultation - no rales, rhonchi or wheezes  CV: regular rate and rhythm, normal S1 S2, no S3 or S4, no murmur, click or rub, no peripheral edema and peripheral pulses strong  ABDOMEN: soft, nontender, no hepatosplenomegaly, no masses and bowel sounds normal  MS: no gross musculoskeletal defects noted, no edema  SKIN: no suspicious lesions or rashes  NEURO: Normal strength and tone, mentation intact and speech normal  PSYCH: mentation appears normal, affect normal/bright    No results found for this or any previous visit (from the past 24 hour(s)).        Assessment & Plan   Problem List Items Addressed This Visit     None      Visit Diagnoses     Benign paroxysmal positional vertigo, unspecified laterality    -  Primary    Non-intractable vomiting with nausea, unspecified vomiting type             Dizziness has actually resolved; seems to be characteristic of BPPV  Refer to Dizzy center to be evaluated and treated  HTN likely related to recent Sudafed use, so recommended that she stop taking it.  Rx Zofran PRN associated nausea    See Patient Instructions  Return in about 1 week (around 9/22/2020) for re-check / follow-up, BP Recheck.    Aviva Rebollar, DO  [facility]

## 2020-10-13 DIAGNOSIS — F32.5 MAJOR DEPRESSIVE DISORDER IN FULL REMISSION, UNSPECIFIED WHETHER RECURRENT (H): ICD-10-CM

## 2020-10-14 RX ORDER — SERTRALINE HYDROCHLORIDE 100 MG/1
150 TABLET, FILM COATED ORAL DAILY
Qty: 135 TABLET | Refills: 1 | Status: SHIPPED | OUTPATIENT
Start: 2020-10-14 | End: 2021-05-11

## 2020-10-27 DIAGNOSIS — B00.9 HSV (HERPES SIMPLEX VIRUS) INFECTION: ICD-10-CM

## 2020-10-29 RX ORDER — VALACYCLOVIR HYDROCHLORIDE 500 MG/1
500 TABLET, FILM COATED ORAL 2 TIMES DAILY
Qty: 6 TABLET | Refills: 3 | Status: SHIPPED | OUTPATIENT
Start: 2020-10-29 | End: 2021-04-13

## 2020-12-06 ENCOUNTER — HEALTH MAINTENANCE LETTER (OUTPATIENT)
Age: 65
End: 2020-12-06

## 2021-01-15 ENCOUNTER — HEALTH MAINTENANCE LETTER (OUTPATIENT)
Age: 66
End: 2021-01-15

## 2021-01-29 ENCOUNTER — TELEPHONE (OUTPATIENT)
Dept: FAMILY MEDICINE | Facility: CLINIC | Age: 66
End: 2021-01-29

## 2021-01-29 NOTE — TELEPHONE ENCOUNTER
Needs of attention regarding:  -Cervical Cancer Screening  -Wellness (Physical) Visit     Health Maintenance Topics with due status: Overdue       Topic Date Due    ADVANCE CARE PLANNING 1955    HIV SCREENING 02/06/1972    PREVENTIVE CARE VISIT 08/27/2020    INFLUENZA VACCINE 09/01/2020    PAP 11/10/2020    DTAP/TDAP/TD IMMUNIZATION 12/01/2020       Communication:  See MyChart message

## 2021-03-08 ENCOUNTER — OFFICE VISIT (OUTPATIENT)
Dept: URGENT CARE | Facility: URGENT CARE | Age: 66
End: 2021-03-08
Payer: COMMERCIAL

## 2021-03-08 VITALS
WEIGHT: 150 LBS | HEIGHT: 68 IN | DIASTOLIC BLOOD PRESSURE: 84 MMHG | RESPIRATION RATE: 15 BRPM | SYSTOLIC BLOOD PRESSURE: 118 MMHG | OXYGEN SATURATION: 99 % | BODY MASS INDEX: 22.73 KG/M2 | HEART RATE: 75 BPM | TEMPERATURE: 98.1 F

## 2021-03-08 DIAGNOSIS — N30.00 ACUTE CYSTITIS WITHOUT HEMATURIA: Primary | ICD-10-CM

## 2021-03-08 LAB
ALBUMIN UR-MCNC: NEGATIVE MG/DL
APPEARANCE UR: CLEAR
BACTERIA #/AREA URNS HPF: ABNORMAL /HPF
BILIRUB UR QL STRIP: ABNORMAL
COLOR UR AUTO: YELLOW
GLUCOSE UR STRIP-MCNC: NEGATIVE MG/DL
HGB UR QL STRIP: NEGATIVE
KETONES UR STRIP-MCNC: ABNORMAL MG/DL
LEUKOCYTE ESTERASE UR QL STRIP: ABNORMAL
NITRATE UR QL: NEGATIVE
NON-SQ EPI CELLS #/AREA URNS LPF: ABNORMAL /LPF
PH UR STRIP: 5 PH (ref 5–7)
RBC #/AREA URNS AUTO: ABNORMAL /HPF
SOURCE: ABNORMAL
SP GR UR STRIP: >1.03 (ref 1–1.03)
UROBILINOGEN UR STRIP-ACNC: 0.2 EU/DL (ref 0.2–1)
WBC #/AREA URNS AUTO: ABNORMAL /HPF

## 2021-03-08 PROCEDURE — 99213 OFFICE O/P EST LOW 20 MIN: CPT | Performed by: PHYSICIAN ASSISTANT

## 2021-03-08 PROCEDURE — 81001 URINALYSIS AUTO W/SCOPE: CPT | Performed by: PHYSICIAN ASSISTANT

## 2021-03-08 RX ORDER — NITROFURANTOIN 25; 75 MG/1; MG/1
100 CAPSULE ORAL 2 TIMES DAILY
Qty: 14 CAPSULE | Refills: 0 | Status: SHIPPED | OUTPATIENT
Start: 2021-03-08 | End: 2021-03-15

## 2021-03-08 ASSESSMENT — MIFFLIN-ST. JEOR: SCORE: 1270.96

## 2021-03-08 NOTE — PROGRESS NOTES
SUBJECTIVE:   Angelic Correa is a 66 year old female who  presents today for a possible UTI. Symptoms of dysuria, frequency and burning have been going on for 1day(s).  Hematuria no.  sudden onsetand moderate.  There is no history of fever, chills, nausea or vomiting.  No history of vaginal discharge. This patient does have a history of urinary tract infections. Patient denies long duration, rigors, flank pain, temperature > 101 degrees F. and Vomiting, significant nausea or diarrhea     Past Medical History:   Diagnosis Date     Glaucoma      Current Outpatient Medications   Medication Sig Dispense Refill     nitroFURantoin macrocrystal-monohydrate (MACROBID) 100 MG capsule Take 1 capsule (100 mg) by mouth 2 times daily for 7 days 14 capsule 0     sertraline (ZOLOFT) 100 MG tablet Take 1.5 tablets (150 mg) by mouth daily 135 tablet 1     valACYclovir (VALTREX) 500 MG tablet Take 1 tablet (500 mg) by mouth 2 times daily 6 tablet 3     albuterol (PROAIR HFA/PROVENTIL HFA/VENTOLIN HFA) 108 (90 Base) MCG/ACT inhaler Inhale 2 puffs into the lungs every 6 hours as needed for shortness of breath / dyspnea or wheezing (Patient not taking: Reported on 9/15/2020) 1 Inhaler 0     augmented betamethasone dipropionate (DIPROLENE-AF) 0.05 % external cream Apply topically 2 times daily 50 g 1     clobetasol (TEMOVATE) 0.05 % external cream Apply topically 2 times daily as needed (eczema) 15 g 3     fluticasone (FLONASE) 50 MCG/ACT spray 1 spray       ondansetron (ZOFRAN-ODT) 4 MG ODT tab Take 1 tablet (4 mg) by mouth every 6 hours as needed for nausea or vomiting 20 tablet 0     Social History     Tobacco Use     Smoking status: Former Smoker     Quit date: 1996     Years since quittin.2     Smokeless tobacco: Never Used   Substance Use Topics     Alcohol use: Yes       ROS:   Review of systems negative except as stated above.    OBJECTIVE:  /84   Pulse 75   Temp 98.1  F (36.7  C) (Oral)   Resp 15   Ht 1.715 m  "(5' 7.5\")   Wt 68 kg (150 lb)   SpO2 99%   BMI 23.15 kg/m    GENERAL APPEARANCE: healthy, alert and no distress  RESP: lungs clear to auscultation - no rales, rhonchi or wheezes  CV: regular rates and rhythm, normal S1 S2, no murmur noted  ABDOMEN:  soft, nontender, no HSM or masses  BACK: No CVA tenderness  SKIN: no suspicious lesions or rashes    ASSESSMENT / PLAN:  1. Acute cystitis without hematuria  Drink plenty of fluids.  Prevention and treatment of UTI's discussed.Signs and symptoms of pyelonephritis mentioned.   - *UA reflex to Microscopic and Culture ([facility] (except [place] and [place])  - Urine Microscopic  - nitroFURantoin macrocrystal-monohydrate (MACROBID) 100 MG capsule; Take 1 capsule (100 mg) by mouth 2 times daily for 7 days  Dispense: 14 capsule; Refill: 0      Diagnosis and treatment plan was reviewed with patient and/or family.   We went over any labs or imaging. Discussed worsening symptoms or little to no relief despite treatment plan to follow-up with PCP or return to clinic.  Patient verbalizes understanding. All questions were addressed and answered.   Yasemin Lopez PA-C    "

## 2021-04-12 DIAGNOSIS — B00.9 HSV (HERPES SIMPLEX VIRUS) INFECTION: ICD-10-CM

## 2021-04-12 NOTE — TELEPHONE ENCOUNTER
Pharmacy calling for refill of valacyclovir for the patient. Gave pharmacy clinic fax number. Elizabeth Romero RN

## 2021-04-13 RX ORDER — VALACYCLOVIR HYDROCHLORIDE 500 MG/1
500 TABLET, FILM COATED ORAL 2 TIMES DAILY
Qty: 6 TABLET | Refills: 3 | Status: SHIPPED | OUTPATIENT
Start: 2021-04-13 | End: 2021-08-25

## 2021-04-13 NOTE — TELEPHONE ENCOUNTER
"Failed protocol.  please route to  team if patient needs an appointment     Myranda HUMPHREYRN BSN  [facility]  [phone]      Requested Prescriptions   Pending Prescriptions Disp Refills     valACYclovir (VALTREX) 500 MG tablet 6 tablet 3     Sig: Take 1 tablet (500 mg) by mouth 2 times daily       Antivirals for Herpes Protocol Failed - 4/12/2021  9:39 AM        Failed - Normal serum creatinine on file in past 12 months     No lab results found.    Ok to refill medication if creatinine is low          Passed - Patient is age 12 or older        Passed - Recent (12 mo) or future (30 days) visit within the authorizing provider's specialty     Patient has had an office visit with the authorizing provider or a provider within the authorizing providers department within the previous 12 mos or has a future within next 30 days. See \"Patient Info\" tab in inbasket, or \"Choose Columns\" in Meds & Orders section of the refill encounter.              Passed - Medication is active on med list             "

## 2021-05-10 DIAGNOSIS — F32.5 MAJOR DEPRESSIVE DISORDER IN FULL REMISSION, UNSPECIFIED WHETHER RECURRENT (H): ICD-10-CM

## 2021-05-11 RX ORDER — SERTRALINE HYDROCHLORIDE 100 MG/1
150 TABLET, FILM COATED ORAL DAILY
Qty: 45 TABLET | Refills: 0 | Status: SHIPPED | OUTPATIENT
Start: 2021-05-11 | End: 2021-06-18

## 2021-05-11 NOTE — TELEPHONE ENCOUNTER
1 month refill ok  Patient due for depression/anxiety recheck.   Virtual video visit ok    Mendez Rodriguez PA-C

## 2021-06-07 ENCOUNTER — OFFICE VISIT (OUTPATIENT)
Dept: URGENT CARE | Facility: URGENT CARE | Age: 66
End: 2021-06-07
Payer: COMMERCIAL

## 2021-06-07 VITALS
WEIGHT: 151 LBS | RESPIRATION RATE: 18 BRPM | OXYGEN SATURATION: 99 % | HEART RATE: 62 BPM | SYSTOLIC BLOOD PRESSURE: 170 MMHG | DIASTOLIC BLOOD PRESSURE: 90 MMHG | BODY MASS INDEX: 23.3 KG/M2 | TEMPERATURE: 98.8 F

## 2021-06-07 DIAGNOSIS — N89.8 VAGINAL ITCHING: ICD-10-CM

## 2021-06-07 DIAGNOSIS — R11.0 NAUSEA: ICD-10-CM

## 2021-06-07 DIAGNOSIS — N76.0 BV (BACTERIAL VAGINOSIS): ICD-10-CM

## 2021-06-07 DIAGNOSIS — B96.89 BV (BACTERIAL VAGINOSIS): ICD-10-CM

## 2021-06-07 DIAGNOSIS — R39.15 URINARY URGENCY: Primary | ICD-10-CM

## 2021-06-07 DIAGNOSIS — K29.70 GASTRITIS WITHOUT BLEEDING, UNSPECIFIED CHRONICITY, UNSPECIFIED GASTRITIS TYPE: ICD-10-CM

## 2021-06-07 LAB
ALBUMIN SERPL-MCNC: 3.9 G/DL (ref 3.4–5)
ALBUMIN UR-MCNC: NEGATIVE MG/DL
ALP SERPL-CCNC: 124 U/L (ref 40–150)
ALT SERPL W P-5'-P-CCNC: 23 U/L (ref 0–50)
AMYLASE SERPL-CCNC: 48 U/L (ref 30–110)
ANION GAP SERPL CALCULATED.3IONS-SCNC: 2 MMOL/L (ref 3–14)
APPEARANCE UR: CLEAR
AST SERPL W P-5'-P-CCNC: 23 U/L (ref 0–45)
BASOPHILS # BLD AUTO: 0 10E9/L (ref 0–0.2)
BASOPHILS NFR BLD AUTO: 0.1 %
BILIRUB SERPL-MCNC: 0.6 MG/DL (ref 0.2–1.3)
BILIRUB UR QL STRIP: NEGATIVE
BUN SERPL-MCNC: 17 MG/DL (ref 7–30)
CALCIUM SERPL-MCNC: 9.7 MG/DL (ref 8.5–10.1)
CHLORIDE SERPL-SCNC: 103 MMOL/L (ref 94–109)
CO2 SERPL-SCNC: 30 MMOL/L (ref 20–32)
COLOR UR AUTO: YELLOW
CREAT SERPL-MCNC: 0.75 MG/DL (ref 0.52–1.04)
DIFFERENTIAL METHOD BLD: NORMAL
EOSINOPHIL # BLD AUTO: 0.2 10E9/L (ref 0–0.7)
EOSINOPHIL NFR BLD AUTO: 3.5 %
ERYTHROCYTE [DISTWIDTH] IN BLOOD BY AUTOMATED COUNT: 13.5 % (ref 10–15)
GFR SERPL CREATININE-BSD FRML MDRD: 84 ML/MIN/{1.73_M2}
GLUCOSE SERPL-MCNC: 105 MG/DL (ref 70–99)
GLUCOSE UR STRIP-MCNC: NEGATIVE MG/DL
HCT VFR BLD AUTO: 41.9 % (ref 35–47)
HGB BLD-MCNC: 13.7 G/DL (ref 11.7–15.7)
HGB UR QL STRIP: NEGATIVE
KETONES UR STRIP-MCNC: NEGATIVE MG/DL
LEUKOCYTE ESTERASE UR QL STRIP: ABNORMAL
LIPASE SERPL-CCNC: 121 U/L (ref 73–393)
LYMPHOCYTES # BLD AUTO: 2.3 10E9/L (ref 0.8–5.3)
LYMPHOCYTES NFR BLD AUTO: 33.5 %
MCH RBC QN AUTO: 31.4 PG (ref 26.5–33)
MCHC RBC AUTO-ENTMCNC: 32.7 G/DL (ref 31.5–36.5)
MCV RBC AUTO: 96 FL (ref 78–100)
MONOCYTES # BLD AUTO: 0.7 10E9/L (ref 0–1.3)
MONOCYTES NFR BLD AUTO: 10.8 %
NEUTROPHILS # BLD AUTO: 3.6 10E9/L (ref 1.6–8.3)
NEUTROPHILS NFR BLD AUTO: 52.1 %
NITRATE UR QL: NEGATIVE
NON-SQ EPI CELLS #/AREA URNS LPF: ABNORMAL /LPF
PH UR STRIP: 6 PH (ref 5–7)
PLATELET # BLD AUTO: 232 10E9/L (ref 150–450)
POTASSIUM SERPL-SCNC: 4.1 MMOL/L (ref 3.4–5.3)
PROT SERPL-MCNC: 8.2 G/DL (ref 6.8–8.8)
RBC # BLD AUTO: 4.36 10E12/L (ref 3.8–5.2)
RBC #/AREA URNS AUTO: ABNORMAL /HPF
SODIUM SERPL-SCNC: 135 MMOL/L (ref 133–144)
SOURCE: ABNORMAL
SP GR UR STRIP: 1.01 (ref 1–1.03)
SPECIMEN SOURCE: ABNORMAL
UROBILINOGEN UR STRIP-ACNC: 0.2 EU/DL (ref 0.2–1)
WBC # BLD AUTO: 6.9 10E9/L (ref 4–11)
WBC #/AREA URNS AUTO: ABNORMAL /HPF
WET PREP SPEC: ABNORMAL

## 2021-06-07 PROCEDURE — 87186 SC STD MICRODIL/AGAR DIL: CPT | Performed by: PHYSICIAN ASSISTANT

## 2021-06-07 PROCEDURE — 85025 COMPLETE CBC W/AUTO DIFF WBC: CPT | Performed by: PHYSICIAN ASSISTANT

## 2021-06-07 PROCEDURE — 99214 OFFICE O/P EST MOD 30 MIN: CPT | Performed by: PHYSICIAN ASSISTANT

## 2021-06-07 PROCEDURE — 81001 URINALYSIS AUTO W/SCOPE: CPT | Performed by: FAMILY MEDICINE

## 2021-06-07 PROCEDURE — 87086 URINE CULTURE/COLONY COUNT: CPT | Performed by: PHYSICIAN ASSISTANT

## 2021-06-07 PROCEDURE — 80053 COMPREHEN METABOLIC PANEL: CPT | Performed by: PHYSICIAN ASSISTANT

## 2021-06-07 PROCEDURE — 87210 SMEAR WET MOUNT SALINE/INK: CPT | Performed by: FAMILY MEDICINE

## 2021-06-07 PROCEDURE — 83690 ASSAY OF LIPASE: CPT | Performed by: PHYSICIAN ASSISTANT

## 2021-06-07 PROCEDURE — 87088 URINE BACTERIA CULTURE: CPT | Performed by: PHYSICIAN ASSISTANT

## 2021-06-07 PROCEDURE — 82150 ASSAY OF AMYLASE: CPT | Performed by: PHYSICIAN ASSISTANT

## 2021-06-07 PROCEDURE — 36415 COLL VENOUS BLD VENIPUNCTURE: CPT | Performed by: PHYSICIAN ASSISTANT

## 2021-06-07 RX ORDER — OMEPRAZOLE 40 MG/1
40 CAPSULE, DELAYED RELEASE ORAL DAILY
Qty: 30 CAPSULE | Refills: 0 | Status: SHIPPED | OUTPATIENT
Start: 2021-06-07 | End: 2021-07-07

## 2021-06-07 RX ORDER — METRONIDAZOLE 7.5 MG/G
1 GEL VAGINAL 2 TIMES DAILY
Qty: 70 G | Refills: 0 | Status: SHIPPED | OUTPATIENT
Start: 2021-06-07 | End: 2021-06-14

## 2021-06-07 NOTE — PROGRESS NOTES
Assessment & Plan     Urinary urgency  UA negative for infection  Urine culture pending  - UA with Microscopic reflex to Culture    Vaginal itching  Positive for BV  Treat with flagyl  - Wet prep    Nausea  Amylase and Lipase negative for pancreatitis  CMP negative for electrolyte abnormalities  Start on omeprazole for gastritis  - CBC with platelets differential  - Comprehensive metabolic panel  - Lipase  - Amylase  - Urine Culture Aerobic Bacterial  - omeprazole (PRILOSEC) 40 MG DR capsule; Take 1 capsule (40 mg) by mouth daily    BV (bacterial vaginosis)  metrogel for BV  - metroNIDAZOLE (METROGEL) 0.75 % vaginal gel; Place 1 applicator (5 g) vaginally 2 times daily for 7 days    Gastritis without bleeding, unspecified chronicity, unspecified gastritis type  Start on prilosec for gastritis  - omeprazole (PRILOSEC) 40 MG DR capsule; Take 1 capsule (40 mg) by mouth daily    Review of external notes as documented elsewhere in note        No follow-ups on file.    Srinath Lara PA-C  [facility]rological InstituteSUREKHA Atwood is a 66 year old who presents for the following health issues     HPI     Vaginal discharge, odor  Nausea and upset stomach    Review of Systems   Constitutional, HEENT, cardiovascular, pulmonary, GI, , musculoskeletal, neuro, skin, endocrine and psych systems are negative, except as otherwise noted.      Objective    BP (!) 170/90   Pulse 62   Temp 98.8  F (37.1  C) (Tympanic)   Resp 18   Wt 68.5 kg (151 lb)   SpO2 99%   BMI 23.30 kg/m    Body mass index is 23.3 kg/m .  Physical Exam   GENERAL: healthy, alert and no distress  EYES: Eyes grossly normal to inspection, PERRL and conjunctivae and sclerae normal  HENT: ear canals and TM's normal, nose and mouth without ulcers or lesions  NECK: no adenopathy, no asymmetry, masses, or scars and thyroid normal to palpation  RESP: lungs clear to auscultation - no rales, rhonchi or wheezes  CV: regular rate and rhythm,  normal S1 S2, no S3 or S4, no murmur, click or rub, no peripheral edema and peripheral pulses strong  ABDOMEN: soft, nontender, no hepatosplenomegaly, no masses and bowel sounds normal  MS: no gross musculoskeletal defects noted, no edema  SKIN: no suspicious lesions or rashes  NEURO: Normal strength and tone, mentation intact and speech normal  PSYCH: mentation appears normal, affect normal/bright    Results for orders placed or performed in visit on 06/07/21   UA with Microscopic reflex to Culture     Status: Abnormal    Specimen: Midstream Urine   Result Value Ref Range    Color Urine Yellow     Appearance Urine Clear     Glucose Urine Negative NEG^Negative mg/dL    Bilirubin Urine Negative NEG^Negative    Ketones Urine Negative NEG^Negative mg/dL    Specific Gravity Urine 1.015 1.003 - 1.035    pH Urine 6.0 5.0 - 7.0 pH    Protein Albumin Urine Negative NEG^Negative mg/dL    Urobilinogen Urine 0.2 0.2 - 1.0 EU/dL    Nitrite Urine Negative NEG^Negative    Blood Urine Negative NEG^Negative    Leukocyte Esterase Urine Small (A) NEG^Negative    Source Midstream Urine     WBC Urine 0 - 5 OTO5^0 - 5 /HPF    RBC Urine O - 2 OTO2^O - 2 /HPF    Squamous Epithelial /LPF Urine Few FEW^Few /LPF   CBC with platelets differential     Status: None   Result Value Ref Range    WBC 6.9 4.0 - 11.0 10e9/L    RBC Count 4.36 3.8 - 5.2 10e12/L    Hemoglobin 13.7 11.7 - 15.7 g/dL    Hematocrit 41.9 35.0 - 47.0 %    MCV 96 78 - 100 fl    MCH 31.4 26.5 - 33.0 pg    MCHC 32.7 31.5 - 36.5 g/dL    RDW 13.5 10.0 - 15.0 %    Platelet Count 232 150 - 450 10e9/L    % Neutrophils 52.1 %    % Lymphocytes 33.5 %    % Monocytes 10.8 %    % Eosinophils 3.5 %    % Basophils 0.1 %    Absolute Neutrophil 3.6 1.6 - 8.3 10e9/L    Absolute Lymphocytes 2.3 0.8 - 5.3 10e9/L    Absolute Monocytes 0.7 0.0 - 1.3 10e9/L    Absolute Eosinophils 0.2 0.0 - 0.7 10e9/L    Absolute Basophils 0.0 0.0 - 0.2 10e9/L    Diff Method Automated Method    Comprehensive  metabolic panel     Status: Abnormal   Result Value Ref Range    Sodium 135 133 - 144 mmol/L    Potassium 4.1 3.4 - 5.3 mmol/L    Chloride 103 94 - 109 mmol/L    Carbon Dioxide 30 20 - 32 mmol/L    Anion Gap 2 (L) 3 - 14 mmol/L    Glucose 105 (H) 70 - 99 mg/dL    Urea Nitrogen 17 7 - 30 mg/dL    Creatinine 0.75 0.52 - 1.04 mg/dL    GFR Estimate 84 >60 mL/min/[1.73_m2]    GFR Estimate If Black >90 >60 mL/min/[1.73_m2]    Calcium 9.7 8.5 - 10.1 mg/dL    Bilirubin Total 0.6 0.2 - 1.3 mg/dL    Albumin 3.9 3.4 - 5.0 g/dL    Protein Total 8.2 6.8 - 8.8 g/dL    Alkaline Phosphatase 124 40 - 150 U/L    ALT 23 0 - 50 U/L    AST 23 0 - 45 U/L   Amylase     Status: None   Result Value Ref Range    Amylase 48 30 - 110 U/L   Wet prep     Status: Abnormal    Specimen: Vagina   Result Value Ref Range    Specimen Description Vagina     Wet Prep No Trichomonas seen     Wet Prep Moderate  Clue cells seen   (A)     Wet Prep No yeast seen     Wet Prep Moderate  WBC'S seen

## 2021-06-07 NOTE — PATIENT INSTRUCTIONS
Patient Education     Bacterial Vaginosis    You have a vaginal infection called bacterial vaginosis (BV). Both good and bad bacteria are present in a healthy vagina. BV occurs when these bacteria get out of balance. The number of bad bacteria increase. And the number of good bacteria decrease. BV is linked with sexual activity, but it's not a sexually transmitted infection (STI).   BV may or may not cause symptoms. If symptoms do occur, they can include:     Thin, gray, milky-white, or sometimes green discharge    Unpleasant odor or  fishy  smell    Itching, burning, or pain in or around the vagina  It is not known what causes BV, but certain factors can make the problem more likely. These can include:     Douching    Spermicides    Use of antibiotics    Change in hormone levels with pregnancy, breastfeeding, or menopause    Having sex with a new partner    Having sex with more than one partner  BV will sometimes go away on its own. But treatment is often advised. This is because untreated BV can raise the risk of more serious health problems such as:     Pelvic inflammatory disease (PID)     delivery (giving birth to a baby early if you re pregnant)    HIV and some other sexually transmitted infections (STIs)    Infection after surgery on the reproductive organs  Home care  General care    BV is most often treated with medicines called antibiotics. These may be given as pills or as a vaginal cream. If antibiotics are prescribed, be sure to use them exactly as directed. And complete all of the medicine, even if your symptoms go away.    Don't douche or having sex during treatment.    If you have sex with a female partner, ask your healthcare provider if she should also be treated.  Prevention    Don't douche.    Don't have sex. If you do have sex, then take steps to lower your risk:  ? Use condoms when having sex.  ? Limit the number of sex partners you have.    Follow-up care  Follow up with your  healthcare provider, or as advised.   When to get medical advice  Call your healthcare provider right away if:     You have a fever of 100.4 F (38 C) or higher, or as directed by your provider.    Your symptoms get worse, or they don t go away within a few days of starting treatment.    You have new pain in the lower belly or pelvic region.    You have side effects that bother you or a reaction to the pills or cream you re prescribed.    You or any of your sex partners have new symptoms, such as a rash, joint pain, or sores.  [email or web address] last reviewed this educational content on 6/1/2020 2000-2021 [employer]. All rights reserved. This information is not intended as a substitute for professional medical care. Always follow your healthcare professional's instructions.

## 2021-06-08 LAB
BACTERIA SPEC CULT: ABNORMAL
Lab: ABNORMAL
SPECIMEN SOURCE: ABNORMAL

## 2021-06-09 ENCOUNTER — NURSE TRIAGE (OUTPATIENT)
Dept: NURSING | Facility: CLINIC | Age: 66
End: 2021-06-09

## 2021-06-09 ENCOUNTER — TELEPHONE (OUTPATIENT)
Dept: URGENT CARE | Facility: URGENT CARE | Age: 66
End: 2021-06-09

## 2021-06-09 DIAGNOSIS — N39.0 URINARY TRACT INFECTION WITHOUT HEMATURIA, SITE UNSPECIFIED: Primary | ICD-10-CM

## 2021-06-09 RX ORDER — NITROFURANTOIN 25; 75 MG/1; MG/1
100 CAPSULE ORAL 2 TIMES DAILY
Qty: 10 CAPSULE | Refills: 0
Start: 2021-06-09 | End: 2021-06-14

## 2021-06-09 NOTE — TELEPHONE ENCOUNTER
Pt notified by nurse triage and Rx called into pt pharmacy. Please see nurse telephone encounter.     HEMANT Thomas, Medical Assistant

## 2021-06-12 ENCOUNTER — NURSE TRIAGE (OUTPATIENT)
Dept: NURSING | Facility: CLINIC | Age: 66
End: 2021-06-12

## 2021-06-12 NOTE — TELEPHONE ENCOUNTER
She had messages on her phone after her Urgent care visit and is calling back to see what they were for.  She is taking the medication for her BV and UTI and feeling much better.  She will let us know if anything changes.    Monae Chaudhary RN/ [facility]          Additional Information    [1] Follow-up call to recent contact AND [2] information only call, no triage required    Protocols used: INFORMATION ONLY CALL-A-

## 2021-06-18 ENCOUNTER — OFFICE VISIT (OUTPATIENT)
Dept: INTERNAL MEDICINE | Facility: CLINIC | Age: 66
End: 2021-06-18
Payer: COMMERCIAL

## 2021-06-18 VITALS
OXYGEN SATURATION: 97 % | TEMPERATURE: 98.1 F | WEIGHT: 148.6 LBS | DIASTOLIC BLOOD PRESSURE: 82 MMHG | BODY MASS INDEX: 22.93 KG/M2 | HEART RATE: 66 BPM | SYSTOLIC BLOOD PRESSURE: 126 MMHG

## 2021-06-18 DIAGNOSIS — F32.5 MAJOR DEPRESSIVE DISORDER IN FULL REMISSION, UNSPECIFIED WHETHER RECURRENT (H): Primary | ICD-10-CM

## 2021-06-18 DIAGNOSIS — Z23 ENCOUNTER FOR IMMUNIZATION: ICD-10-CM

## 2021-06-18 PROCEDURE — 90471 IMMUNIZATION ADMIN: CPT | Performed by: INTERNAL MEDICINE

## 2021-06-18 PROCEDURE — 99213 OFFICE O/P EST LOW 20 MIN: CPT | Mod: 25 | Performed by: INTERNAL MEDICINE

## 2021-06-18 PROCEDURE — 90714 TD VACC NO PRESV 7 YRS+ IM: CPT | Performed by: INTERNAL MEDICINE

## 2021-06-18 RX ORDER — SERTRALINE HYDROCHLORIDE 100 MG/1
150 TABLET, FILM COATED ORAL DAILY
Qty: 135 TABLET | Refills: 3 | Status: SHIPPED | OUTPATIENT
Start: 2021-06-18 | End: 2022-04-22

## 2021-06-18 ASSESSMENT — ANXIETY QUESTIONNAIRES
GAD7 TOTAL SCORE: 0
6. BECOMING EASILY ANNOYED OR IRRITABLE: NOT AT ALL
2. NOT BEING ABLE TO STOP OR CONTROL WORRYING: NOT AT ALL
1. FEELING NERVOUS, ANXIOUS, OR ON EDGE: NOT AT ALL
IF YOU CHECKED OFF ANY PROBLEMS ON THIS QUESTIONNAIRE, HOW DIFFICULT HAVE THESE PROBLEMS MADE IT FOR YOU TO DO YOUR WORK, TAKE CARE OF THINGS AT HOME, OR GET ALONG WITH OTHER PEOPLE: SOMEWHAT DIFFICULT
7. FEELING AFRAID AS IF SOMETHING AWFUL MIGHT HAPPEN: NOT AT ALL
5. BEING SO RESTLESS THAT IT IS HARD TO SIT STILL: NOT AT ALL
3. WORRYING TOO MUCH ABOUT DIFFERENT THINGS: NOT AT ALL

## 2021-06-18 ASSESSMENT — PATIENT HEALTH QUESTIONNAIRE - PHQ9
SUM OF ALL RESPONSES TO PHQ QUESTIONS 1-9: 5
5. POOR APPETITE OR OVEREATING: NOT AT ALL

## 2021-06-18 NOTE — PROGRESS NOTES
Assessment & Plan     Major depressive disorder in full remission, unspecified whether recurrent (H)  Tolerating sertraline well. Had tried other SSRIs in the past but has had the most success with sertraline. She has tried weaning off of it but noticed a definite worsening of her mood. Would like to continue on her current dose. Refilled for a year.  - sertraline (ZOLOFT) 100 MG tablet; Take 1.5 tablets (150 mg) by mouth daily    Encounter for immunization  - TD (ADULT, 7+) PRESERVE FREE    Return in about 6 months (around 2021) for Physical Exam.    Tyron Ramos MD  [facility]    Vladimir Atwood is a 66 year old who presents for the following health issues:    Depression Followup    How are you doing with your depression since your last visit? Improved     Are you having other symptoms that might be associated with depression? No    Have you had a significant life event?  No     Are you feeling anxious or having panic attacks?   No    Do you have any concerns with your use of alcohol or other drugs? No    Social History     Tobacco Use     Smoking status: Former Smoker     Quit date: 1996     Years since quittin.4     Smokeless tobacco: Never Used   Substance Use Topics     Alcohol use: Yes     Drug use: No     PHQ 2019 9/15/2020 2021   PHQ-9 Total Score 2 2 9   Q9: Thoughts of better off dead/self-harm past 2 weeks Not at all Not at all Not at all     SHIRLEY-7 SCORE 2019   Total Score 0 (minimal anxiety) 3 (minimal anxiety)   Total Score 0 3     Last PHQ-9 2021   1.  Little interest or pleasure in doing things 2   2.  Feeling down, depressed, or hopeless 1   3.  Trouble falling or staying asleep, or sleeping too much 3   4.  Feeling tired or having little energy 2   5.  Poor appetite or overeating 0   6.  Feeling bad about yourself 0   7.  Trouble concentrating 1   8.  Moving slowly or restless 0   Q9: Thoughts of better off  dead/self-harm past 2 weeks 0   PHQ-9 Total Score 9   Difficulty at work, home, or with people -     SHIRLEY-7  5/11/2021   1. Feeling nervous, anxious, or on edge 1   2. Not being able to stop or control worrying 1   3. Worrying too much about different things 1   4. Trouble relaxing 0   5. Being so restless that it is hard to sit still 0   6. Becoming easily annoyed or irritable 0   7. Feeling afraid, as if something awful might happen 0   SHIRLEY-7 Total Score 3         Objective    /82   Pulse 66   Temp 98.1  F (36.7  C) (Tympanic)   Wt 67.4 kg (148 lb 9.6 oz)   SpO2 97%   BMI 22.93 kg/m    Body mass index is 22.93 kg/m .     Physical Exam   GENERAL: alert and in no distress.  EYES: conjunctivae/corneas clear. EOMs grossly intact  HENT: NC/AT, facies symmetric.  RESP: No iWOB.  SKIN: No significant ulcers, lesions, or rashes on the visualized portions of the skin  NEURO: Alert. Oriented. Sensation grossly WNL.

## 2021-06-19 ASSESSMENT — ANXIETY QUESTIONNAIRES: GAD7 TOTAL SCORE: 0

## 2021-07-09 ENCOUNTER — HOSPITAL ENCOUNTER (OUTPATIENT)
Dept: MAMMOGRAPHY | Facility: CLINIC | Age: 66
Discharge: HOME | End: 2021-07-09
Attending: PHYSICIAN ASSISTANT | Admitting: PHYSICIAN ASSISTANT
Payer: COMMERCIAL

## 2021-07-09 DIAGNOSIS — Z12.31 VISIT FOR SCREENING MAMMOGRAM: ICD-10-CM

## 2021-07-09 PROCEDURE — 77063 BREAST TOMOSYNTHESIS BI: CPT

## 2021-08-24 DIAGNOSIS — B00.9 HSV (HERPES SIMPLEX VIRUS) INFECTION: ICD-10-CM

## 2021-08-25 RX ORDER — VALACYCLOVIR HYDROCHLORIDE 500 MG/1
500 TABLET, FILM COATED ORAL 2 TIMES DAILY
Qty: 6 TABLET | Refills: 4 | Status: SHIPPED | OUTPATIENT
Start: 2021-08-25 | End: 2022-04-22

## 2021-09-07 ENCOUNTER — NURSE TRIAGE (OUTPATIENT)
Dept: NURSING | Facility: CLINIC | Age: 66
End: 2021-09-07

## 2021-09-07 NOTE — TELEPHONE ENCOUNTER
Patient reports that she felt some heart palpitations this weekend. Her boyfriend is a paramedic and listened to her heart and confirmed there was some irregularity. Patient also had a faster heart rate, she does not know what the rate was. Currently she will notice some palpitations randomly, not as noticeable as this weekend. She denies any chest pain or shortness of breath. Patient has had dizziness on and off for awhile but nothing lasting over a few seconds. Patient reports that she does have a lot of stress currently.    Patient is advised on evaluation today in clinic. She verbalizes understanding and is transferred to scheduling.    Heidi Barclay RN  [facility]        Reason for Disposition    Skipped or extra beat(s) and occurs 4 or more times per minute    Age > 60 years    Additional Information    Negative: Passed out (i.e., fainted, collapsed and was not responding)    Negative: Shock suspected (e.g., cold/pale/clammy skin, too weak to stand, low BP, rapid pulse)    Negative: Difficult to awaken or acting confused (e.g., disoriented, slurred speech)    Negative: Visible sweat on face or sweat dripping down face    Negative: Unable to walk, or can only walk with assistance (e.g., requires support)    Negative: Received SHOCK from implantable cardiac defibrillator and has persisting symptoms (i.e., palpitations, lightheadedness)    Negative: Dizziness, lightheadedness, or weakness and heart beating very rapidly (e.g., > 140 / minute)    Negative: Dizziness, lightheadedness, or weakness and heart beating very slowly (e.g., < 50 / minute)    Negative: Sounds like a life-threatening emergency to the triager    Negative: Chest pain    Negative: Difficulty breathing    Negative: Dizziness, lightheadedness, or weakness    Negative: Heart beating very rapidly (e.g., > 140 / minute) and present now (Exception: during exercise)    Negative: Heart beating very slowly (e.g., < 50 / minute)  (Exception: athlete)    Negative: New or worsened shortness of breath with activity (dyspnea on exertion)    Negative: Patient sounds very sick or weak to the triager    Negative: Wearing a 'Holter monitor' or 'cardiac event monitor'    Negative: Received SHOCK from implantable cardiac defibrillator (and now feels well)    Negative: Heart beating very rapidly (e.g., > 140 / minute) and not present now (Exception: during exercise)    Negative: Skipped or extra beat(s) and increases with exercise or exertion    Negative: History of heart disease (i.e., heart attack, bypass surgery, angina, angioplasty)    Protocols used: HEART RATE AND HEARTBEAT [id number]    COVID 19 Nurse Triage Plan/Patient Instructions    Please be aware that novel coronavirus (COVID-19) may be circulating in the community. If you develop symptoms such as fever, cough, or SOB or if you have concerns about the presence of another infection including coronavirus (COVID-19), please contact your health care provider or visit [email or web address].     Disposition/Instructions    In-Person Visit with provider recommended. Reference Visit Selection Guide.    Thank you for taking steps to prevent the spread of this virus.  o Limit your contact with others.  o Wear a simple mask to cover your cough.  o Wash your hands well and often.    Resources    [facility]: About COVID-19: [email or web address]    CDC: What to Do If You're Sick: www.cdc.gov/coronavirus/2019-ncov/about/steps-when-sick.html    CDC: Ending Home Isolation: www.cdc.gov/coronavirus/2019-ncov/hcp/disposition-in-home-patients.html     CDC: Caring for Someone: www.cdc.gov/coronavirus/2019-ncov/if-you-are-sick/care-for-someone.html     [facility]: Interim Guidance for Hospital Discharge to Home: www.health.[facility].[place].us/diseases/coronavirus/hcp/hospdischarge.pdf    [facility] clinical trials (COVID-19 research studies):  clinicalaffairs.[facility]-clinical-trials     Below are the COVID-19 hotlines at the [place] Department of [facility] ([facility]). Interpreters are available.   o For health questions: Call 326-390-2783 or 1-584.203.9562 (7 a.m. to 7 p.m.)  o For questions about schools and childcare: Call 363-271-6436 or 1-603.727.5755 (7 a.m. to 7 p.m.)

## 2021-09-08 ENCOUNTER — OFFICE VISIT (OUTPATIENT)
Dept: FAMILY MEDICINE | Facility: CLINIC | Age: 66
End: 2021-09-08
Payer: COMMERCIAL

## 2021-09-08 VITALS
WEIGHT: 146 LBS | DIASTOLIC BLOOD PRESSURE: 80 MMHG | SYSTOLIC BLOOD PRESSURE: 134 MMHG | OXYGEN SATURATION: 98 % | BODY MASS INDEX: 22.53 KG/M2 | HEART RATE: 65 BPM

## 2021-09-08 DIAGNOSIS — F32.5 MAJOR DEPRESSIVE DISORDER IN FULL REMISSION, UNSPECIFIED WHETHER RECURRENT (H): ICD-10-CM

## 2021-09-08 DIAGNOSIS — R00.2 PALPITATIONS: ICD-10-CM

## 2021-09-08 DIAGNOSIS — R53.83 OTHER FATIGUE: Primary | ICD-10-CM

## 2021-09-08 LAB
ERYTHROCYTE [DISTWIDTH] IN BLOOD BY AUTOMATED COUNT: 13.5 % (ref 10–15)
HCT VFR BLD AUTO: 42.3 % (ref 35–47)
HGB BLD-MCNC: 13.8 G/DL (ref 11.7–15.7)
MCH RBC QN AUTO: 31.2 PG (ref 26.5–33)
MCHC RBC AUTO-ENTMCNC: 32.6 G/DL (ref 31.5–36.5)
MCV RBC AUTO: 96 FL (ref 78–100)
PLATELET # BLD AUTO: 229 10E3/UL (ref 150–450)
RBC # BLD AUTO: 4.42 10E6/UL (ref 3.8–5.2)
WBC # BLD AUTO: 6.1 10E3/UL (ref 4–11)

## 2021-09-08 PROCEDURE — 36415 COLL VENOUS BLD VENIPUNCTURE: CPT | Performed by: FAMILY MEDICINE

## 2021-09-08 PROCEDURE — 93000 ELECTROCARDIOGRAM COMPLETE: CPT | Performed by: FAMILY MEDICINE

## 2021-09-08 PROCEDURE — 80061 LIPID PANEL: CPT | Performed by: FAMILY MEDICINE

## 2021-09-08 PROCEDURE — 85027 COMPLETE CBC AUTOMATED: CPT | Performed by: FAMILY MEDICINE

## 2021-09-08 PROCEDURE — 84443 ASSAY THYROID STIM HORMONE: CPT | Performed by: FAMILY MEDICINE

## 2021-09-08 PROCEDURE — 80053 COMPREHEN METABOLIC PANEL: CPT | Performed by: FAMILY MEDICINE

## 2021-09-08 PROCEDURE — 99214 OFFICE O/P EST MOD 30 MIN: CPT | Performed by: FAMILY MEDICINE

## 2021-09-08 ASSESSMENT — PATIENT HEALTH QUESTIONNAIRE - PHQ9
SUM OF ALL RESPONSES TO PHQ QUESTIONS 1-9: 2
10. IF YOU CHECKED OFF ANY PROBLEMS, HOW DIFFICULT HAVE THESE PROBLEMS MADE IT FOR YOU TO DO YOUR WORK, TAKE CARE OF THINGS AT HOME, OR GET ALONG WITH OTHER PEOPLE: SOMEWHAT DIFFICULT
SUM OF ALL RESPONSES TO PHQ QUESTIONS 1-9: 2

## 2021-09-09 LAB
ALBUMIN SERPL-MCNC: 4 G/DL (ref 3.4–5)
ALP SERPL-CCNC: 95 U/L (ref 40–150)
ALT SERPL W P-5'-P-CCNC: 26 U/L (ref 0–50)
ANION GAP SERPL CALCULATED.3IONS-SCNC: 6 MMOL/L (ref 3–14)
AST SERPL W P-5'-P-CCNC: 26 U/L (ref 0–45)
BILIRUB SERPL-MCNC: 0.7 MG/DL (ref 0.2–1.3)
BUN SERPL-MCNC: 17 MG/DL (ref 7–30)
CALCIUM SERPL-MCNC: 8.7 MG/DL (ref 8.5–10.1)
CHLORIDE BLD-SCNC: 103 MMOL/L (ref 94–109)
CHOLEST SERPL-MCNC: 334 MG/DL
CO2 SERPL-SCNC: 26 MMOL/L (ref 20–32)
CREAT SERPL-MCNC: 0.68 MG/DL (ref 0.52–1.04)
GFR SERPL CREATININE-BSD FRML MDRD: >90 ML/MIN/1.73M2
GLUCOSE BLD-MCNC: 95 MG/DL (ref 70–99)
HDLC SERPL-MCNC: 84 MG/DL
LDLC SERPL CALC-MCNC: 224 MG/DL
NONHDLC SERPL-MCNC: 250 MG/DL
POTASSIUM BLD-SCNC: 4.1 MMOL/L (ref 3.4–5.3)
PROT SERPL-MCNC: 8.2 G/DL (ref 6.8–8.8)
SODIUM SERPL-SCNC: 135 MMOL/L (ref 133–144)
TRIGL SERPL-MCNC: 131 MG/DL
TSH SERPL DL<=0.005 MIU/L-ACNC: 0.83 MU/L (ref 0.4–4)

## 2021-09-09 ASSESSMENT — PATIENT HEALTH QUESTIONNAIRE - PHQ9: SUM OF ALL RESPONSES TO PHQ QUESTIONS 1-9: 2

## 2021-09-10 ENCOUNTER — MYC MEDICAL ADVICE (OUTPATIENT)
Dept: FAMILY MEDICINE | Facility: CLINIC | Age: 66
End: 2021-09-10

## 2021-09-14 ENCOUNTER — TELEPHONE (OUTPATIENT)
Dept: FAMILY MEDICINE | Facility: CLINIC | Age: 66
End: 2021-09-14

## 2021-09-14 DIAGNOSIS — E78.5 HYPERLIPIDEMIA LDL GOAL <130: Primary | ICD-10-CM

## 2021-09-14 RX ORDER — ATORVASTATIN CALCIUM 20 MG/1
20 TABLET, FILM COATED ORAL DAILY
Qty: 30 TABLET | Refills: 3 | Status: SHIPPED | OUTPATIENT
Start: 2021-09-14 | End: 2022-01-05

## 2021-09-14 NOTE — TELEPHONE ENCOUNTER
Patient given message from Dr. Nieves. Patient agrees to schedule follow up in 3-4 months or call back sooner if any problems or concerns with statin medication.  Elizabeth Romero RN

## 2021-09-14 NOTE — TELEPHONE ENCOUNTER
Called pt; Result message given from Dr. Nieves.    Pt is agreeable to start cholesterol medication. Pt does not feel that she needs another appt to discuss medication she is agreeable to taking.     Pt would like medication called into [facility] pharmacy [id number].     Pt states that she is happy to come back for repeat labs to make sure medication is working.     Pt verbalized understanding, all questions answered.     Reina Baltazar RN

## 2021-09-14 NOTE — TELEPHONE ENCOUNTER
---- Message from Winter Nieves MD sent at 9/14/2021  9:57 AM CDT -----  Please contact pt to inform as well   See results note       Hi Angelic    Rest of  your lab result are back . See details below.  Feel free to call me or follow up if you have questions or ongoing health concerns  Normal thyroid ( TSH)     Lipid remains quiet elevated, and LDL (bad cholesterol ) is much higher then previous. so recommend  starting treatment. You  should schedule a  follow up office visit to discuss results and treatment   In the meantime need to watch diet( low fat, low cholesterol, high fiber diet) and exercise to help improve lipid    Please call clinic to schedule appointment         Here is your report.   Normal liver function tests, kidney functions, glucose and  electrolytes(various salts in your body)             Thanks for your attention ,                               Dr. Winter Nieves

## 2021-09-14 NOTE — TELEPHONE ENCOUNTER
Script  Faxed.  Make sure she discuss med's,  with the pharmacist , so she understands the potential side effects of the statin.    remind pt to do follow up for med check and labs,in 3-4 months  but sooner if problem taking med's .

## 2022-01-04 DIAGNOSIS — E78.5 HYPERLIPIDEMIA LDL GOAL <130: ICD-10-CM

## 2022-01-05 RX ORDER — ATORVASTATIN CALCIUM 20 MG/1
20 TABLET, FILM COATED ORAL DAILY
Qty: 90 TABLET | Refills: 1 | Status: SHIPPED | OUTPATIENT
Start: 2022-01-05 | End: 2022-04-22

## 2022-01-05 NOTE — TELEPHONE ENCOUNTER
Prescription approved per [facility] Refill Protocol.  Katelyn HUSTON RN  [facility]

## 2022-01-10 ENCOUNTER — VIRTUAL VISIT (OUTPATIENT)
Dept: FAMILY MEDICINE | Facility: CLINIC | Age: 67
End: 2022-01-10
Payer: COMMERCIAL

## 2022-01-10 DIAGNOSIS — Z20.822 SUSPECTED 2019 NOVEL CORONAVIRUS INFECTION: Primary | ICD-10-CM

## 2022-01-10 PROCEDURE — 99212 OFFICE O/P EST SF 10 MIN: CPT | Mod: 95 | Performed by: PHYSICIAN ASSISTANT

## 2022-01-10 NOTE — PROGRESS NOTES
Angelic is a 66 year old who is being evaluated via a billable video visit.      How would you like to obtain your AVS? [employer]  If the video visit is dropped, the invitation should be resent by: Text to cell phone: pramod  Will anyone else be joining your video visit? No      Video Start Time: 9:00    Assessment and Plan:     (Z20.822) Suspected 2019 novel coronavirus infection  (primary encounter diagnosis)  Comment: body aches, chills, cough, vaccinated but no booster yet, no sob, leg swelling or cp  Plan: Symptomatic; Yes; 1/8/2022 COVID-19 Virus         (Coronavirus) by PCR  Symptomatic cares, rest/fluids, self isolate x 10 days, and can look into Mab therapy (link provided)  Discussed when to be seen promptly      Autumn Wells PA-C        Subjective   Angelic is a 66 year old who presents for the following health issues    HPI     Chief Complaint   Patient presents with     Suspected Covid     cough, fever, aches, congestion starting saturday night   '  Symptoms started 2 days ago--scratchy throat  She had moderna shots in February and April of 2021, hasn't had booster, she had a localized reaction to the first shot then had 3 days of fatigue with the second shot  She was around known covid case last week  +cough, bodyaches, chills, no lost of taste or smell  She denies shortness of breath, chest pain, leg swelling    Review of Systems   See above      Objective    Vitals - Patient Reported  Temperature (Patient Reported): 100.4  F (38  C)  Pain Score: Moderate Pain (4)  Pain Loc:  (body aches)      Vitals:  No vitals were obtained today due to virtual visit.    Physical Exam   GENERAL: Healthy, alert and no distress  EYES: Eyes grossly normal to inspection.  No discharge or erythema, or obvious scleral/conjunctival abnormalities.  RESP: No audible wheeze, cough, or visible cyanosis.  No visible retractions or increased work of breathing.    SKIN: Visible skin clear. No significant rash, abnormal  pigmentation or lesions.  NEURO: Cranial nerves grossly intact.  Mentation and speech appropriate for age.  PSYCH: Mentation appears normal, affect normal/bright, judgement and insight intact, normal speech and appearance well-groomed.          Video-Visit Details    Type of service:  Video Visit    Video End Time:9:07 AM    Originating Location (pt. Location): Home    Distant Location (provider location):  [facility]     Platform used for Video Visit: [employer]

## 2022-01-10 NOTE — PATIENT INSTRUCTIONS
[email or web address].health.[email or web address]./diseases/coronavirus/mnrap1.html    Take tylenol as needed for pain up to 1000mg three times daily, do not exceed 3000mg in 24 hour period    Take ibuprofen as needed for pain up to 600mg three times daily with food    Get plenty of fluids and rest

## 2022-01-13 ENCOUNTER — LAB (OUTPATIENT)
Dept: URGENT CARE | Facility: URGENT CARE | Age: 67
End: 2022-01-13
Attending: PHYSICIAN ASSISTANT
Payer: COMMERCIAL

## 2022-01-13 DIAGNOSIS — Z20.822 SUSPECTED 2019 NOVEL CORONAVIRUS INFECTION: ICD-10-CM

## 2022-01-13 PROCEDURE — U0003 INFECTIOUS AGENT DETECTION BY NUCLEIC ACID (DNA OR RNA); SEVERE ACUTE RESPIRATORY SYNDROME CORONAVIRUS 2 (SARS-COV-2) (CORONAVIRUS DISEASE [COVID-19]), AMPLIFIED PROBE TECHNIQUE, MAKING USE OF HIGH THROUGHPUT TECHNOLOGIES AS DESCRIBED BY CMS-2020-01-R: HCPCS

## 2022-01-13 PROCEDURE — U0005 INFEC AGEN DETEC AMPLI PROBE: HCPCS

## 2022-01-13 PROCEDURE — 99207 PR NO CHARGE LOS: CPT

## 2022-01-14 ENCOUNTER — TELEPHONE (OUTPATIENT)
Dept: NURSING | Facility: CLINIC | Age: 67
End: 2022-01-14
Payer: COMMERCIAL

## 2022-01-14 LAB — SARS-COV-2 RNA RESP QL NAA+PROBE: POSITIVE

## 2022-01-14 NOTE — TELEPHONE ENCOUNTER
Coronavirus (COVID-19) Notification    Reason for call  Notify of POSITIVE  COVID-19 lab result, assess symptoms,  review [facility] recommendations    Lab Result   Lab test for 2019-nCoV rRt-PCR or SARS-COV-2 PCR  Oropharyngeal AND/OR nasopharyngeal swabs were POSITIVE for 2019-nCoV RNA [OR] SARS-COV-2 RNA (COVID-19) RNA     We have been unable to reach Patient by phone at this time to notify of their Positive COVID-19 result.  Left voicemail message requesting a call back to [phone] [facility] for results.        POSITIVE COVID-19 Letter sent.    Hany Olivarez RN

## 2022-01-14 NOTE — RESULT ENCOUNTER NOTE
Dear Angelic,     Here are your recent results which are positive for covid.  Please get plenty of rest and fluids.  You can also take tylenol as needed.  Please also refer to the CDC guidelines for isolation recommendations: https://www.cdc.gov/coronavirus/2019-ncov/your-health/quarantine-isolation.html    If you develop shortness of breath, leg swelling or sudden chest pain please be seen in the ER immediately.    Please let us know if you have any questions or concerns.    Regards,  Autumn Wells PA-C

## 2022-01-15 ENCOUNTER — HEALTH MAINTENANCE LETTER (OUTPATIENT)
Age: 67
End: 2022-01-15

## 2022-01-18 ENCOUNTER — VIRTUAL VISIT (OUTPATIENT)
Dept: FAMILY MEDICINE | Facility: CLINIC | Age: 67
End: 2022-01-18
Payer: COMMERCIAL

## 2022-01-18 ENCOUNTER — NURSE TRIAGE (OUTPATIENT)
Dept: NURSING | Facility: CLINIC | Age: 67
End: 2022-01-18
Payer: COMMERCIAL

## 2022-01-18 DIAGNOSIS — U07.1 INFECTION DUE TO 2019 NOVEL CORONAVIRUS: ICD-10-CM
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
## 2022-01-18 PROCEDURE — 99213 OFFICE O/P EST LOW 20 MIN: CPT | Mod: GT | Performed by: PHYSICIAN ASSISTANT

## 2022-01-18 NOTE — PATIENT INSTRUCTIONS
Patient Education     Cat Bite    A cat bite can cause a wound deep enough to break the skin. In such cases, the wound is cleaned and then sometimes closed. If the wound is closed it is usually not closed completely. This is so that fluid can drain if the wound becomes infected. Often the wound is left open to heal. In addition to wound care, a tetanus shot may be given, if needed.  Home care    Wash your hands well with soap and warm water before and after caring for the wound. This helps lower the risk of infection.    Care for the wound as directed. If a dressing was applied to the wound, be sure to change it as directed.    If the wound bleeds, place a clean, soft cloth on the wound. Then firmly apply pressure until the bleeding stops. This may take up to 5 minutes. Don't release the pressure and look at the wound during this time.    Always get medical attention for cat bites on the hand. They are highly likely to become infected.    Most wounds heal within 10 days. But an infection can occur even with proper treatment. So be sure to check the wound daily for signs of infection (see below).    Antibiotics may be prescribed. These help prevent or treat infection. If you re given antibiotics, take them as directed. Also be sure to complete the medicines.  Rabies prevention  Rabies is a virus that can be carried in certain animals. These can include domestic animals such as cats and dogs. Pets fully vaccinated against rabies (2 shots) are at very low risk of infection. But because human rabies is almost always fatal, any biting pet should be confined for 10 days as an extra precaution. In general, if there is a risk for rabies, the following steps may need to be taken:    If someone s pet cat has bitten you, it should be kept in a secure area for the next 10 days to watch for signs of illness. If the pet owner won t allow this, contact your local animal control center. If the cat becomes ill or dies during that  time, contact your local animal control center at once so the animal may be tested for rabies. If the cat stays healthy for the next 10 days, there is no danger of rabies in the animal or you.    If a stray cat bit you, contact your local animal control center. They can give information on capture, quarantine, and animal rabies testing.    If you can t find the animal that bit you in the next 2 days, and if rabies exists in your area, you may need to receive the rabies vaccine series. Call your healthcare provider right away. Or return to the emergency department promptly.    All animal bites should be reported to the local animal control center. If you were not given a form to fill out, you can report this yourself.  Follow-up care  Follow up with your healthcare provider, or as directed.  When to seek medical advice  Call your healthcare provider right away if any of these occur:    Signs of infection:  ? Spreading redness or warmth from the wound  ? Increased pain or swelling  ? Fever of 100.4 F (38 C) or higher, or as directed by your healthcare provider  ? Colored fluid or pus draining from the wound  ? Enlarged lymph nodes above the area that was bitten, such as lymph nodes in the armpit if you were bitten on the hand or arm. This may be a sign of cat-scratch disease (cat-scratch fever).    Signs of rabies infection:  ? Headache  ? Confusion  ? Strange behavior  ? Increased salivating or drooling  ? Seizure    Decreased ability to move any body part near the bite area    Bleeding that can't be stopped after 5 minutes of firm pressure  [employer] last reviewed this educational content on 5/1/2018 2000-2021 [employer]. All rights reserved. This information is not intended as a substitute for professional medical care. Always follow your healthcare professional's instructions.           Patient Education   Discharge Instructions for COVID-19 Patients  You have--or may have--COVID-19. Please follow  "the instructions listed below.   If you have a weakened immune system, discuss with your doctor any other actions you need to take.  How can I protect others?  If you have symptoms (fever, cough, body aches or trouble breathing):    Stay home and away from others (self-isolate) until:  ? Your other symptoms have resolved (gotten better). And   ? You've had no fever--and no medicine that reduces fever--for 1 full day (24 hours). And   ? At least 10 days have passed since your symptoms started. (You may need to wait 20 days. Follow the advice of your care team.)  If you don't show symptoms, but testing showed that you have COVID-19:    Stay home and away from others (self-isolate) until at least 10 days have passed since the date of your first positive COVID-19 test.  During this time    Stay in your own room, even for meals. Use your own bathroom if you can.    Stay away from others in your home. No hugging, kissing or shaking hands. No visitors.    Don't go to work, school or anywhere else.    Clean \"high touch\" surfaces often (doorknobs, counters, handles). Use household cleaning spray or wipes.    You'll find a full list of  on the EPA website: www.epa.gov/pesticide-registration/list-n-disinfectants-use-against-sars-cov-2.    Cover your mouth and nose with a mask or other face covering to avoid spreading germs.    Wash your hands and face often. Use soap and water.    Caregivers in these groups are at risk for severe illness due to COVID-19:  ? People 65 years and older  ? People who live in a nursing home or long-term care facility  ? People with chronic disease (lung, heart, cancer, diabetes, kidney, liver, immunologic)  ? People who have a weakened immune system, including those who:    Are in cancer treatment    Take medicine that weakens the immune system, such as corticosteroids    Had a bone marrow or organ transplant    Have an immune deficiency    Have poorly controlled HIV or AIDS    Are obese " (body mass index of 40 or higher)    Smoke regularly    Caregivers should wear gloves while washing dishes, handling laundry and cleaning bedrooms and bathrooms.    Use caution when washing and drying laundry: Don't shake dirty laundry and use the warmest water setting that you can.    For more tips on managing your health at home, go to www.cdc.gov/coronavirus/2019-ncov/downloads/10Things.pdf.  How can I take care of myself at home?  1. Get lots of rest. Drink extra fluids (unless a doctor has told you not to).  2. Take Tylenol (acetaminophen) for fever or pain. If you have liver or kidney problems, ask your family doctor if it's okay to take Tylenol.   Adults can take either:   ? 650 mg (two 325 mg pills) every 4 to 6 hours, or   ? 1,000 mg (two 500 mg pills) every 8 hours as needed.  ? Note: Don't take more than 3,000 mg in one day. Acetaminophen is found in many medicines (both prescribed and over-the-counter medicines). Read all labels to be sure you don't take too much.   For children, check the Tylenol bottle for the right dose. The dose is based on the child's age or weight.  3. If you have other health problems (like cancer, heart failure, an organ transplant or severe kidney disease): Call your specialty clinic if you don't feel better in the next 2 days.  4. Know when to call 911. Emergency warning signs include:  ? Trouble breathing or shortness of breath  ? Pain or pressure in the chest that doesn't go away  ? Feeling confused like you haven't felt before, or not being able to wake up  ? Bluish-colored lips or face  5. Your doctor may have prescribed a blood thinner medicine. Follow their instructions.  Where can I get more information?     [facility] [employer] - About COVID-19:   [email or web address]    CDC - What to Do If You're Sick: www.cdc.gov/coronavirus/2019-ncov/about/steps-when-sick.html    CDC - Ending Home Isolation:  www.cdc.gov/coronavirus/2019-ncov/hcp/disposition-in-home-patients.html    CDC - Caring for Someone: www.cdc.gov/coronavirus/2019-ncov/if-you-are-sick/care-for-someone.html    [facility] - Interim Guidance for Hospital Discharge to Home: www.health.[facility].[place]/diseases/coronavirus/hcp/hospdischarge.pdf    Below are the COVID-19 hotlines at the [place] Department of Health ([facility]). Interpreters are available.  ? For health questions: Call 412-780-4340 or 1-675.392.5383 (7 a.m. to 7 p.m.)  ? For questions about schools and childcare: Call 684-212-8114 or 1-900.284.1109 (7 a.m. to 7 p.m.)    For informational purposes only. Not to replace the advice of your health care provider. Clinically reviewed by Dr. Vlad Donahue.   Copyright   2020 [facility]. All rights reserved. [employer] 100991 - REV 01/05/21.

## 2022-01-18 NOTE — TELEPHONE ENCOUNTER
covid cough moving into chest.  Also cat bites and scratches from new cat got her yesterday.  Virtual or video visit recommended.  Warm transferred to scheduling  Laurel Bailon RN  [facility] Nurse Advisor  COVID 19 Nurse Triage Plan/Patient Instructions    Please be aware that novel coronavirus (COVID-19) may be circulating in the community. If you develop symptoms such as fever, cough, or SOB or if you have concerns about the presence of another infection including coronavirus (COVID-19), please contact your health care provider or visit [email or web address].     Disposition/Instructions    Virtual Visit with provider recommended. Reference Visit Selection Guide.    Thank you for taking steps to prevent the spread of this virus.  o Limit your contact with others.  o Wear a simple mask to cover your cough.  o Wash your hands well and often.    Resources    [facility]: About COVID-19: [email or web address]    CDC: What to Do If You're Sick: www.cdc.gov/coronavirus/2019-ncov/about/steps-when-sick.html    CDC: Ending Home Isolation: www.cdc.gov/coronavirus/2019-ncov/hcp/disposition-in-home-patients.html     CDC: Caring for Someone: www.cdc.gov/coronavirus/2019-ncov/if-you-are-sick/care-for-someone.html     [facility]: Interim Guidance for Hospital Discharge to Home: www.health.[facility].mn.us/diseases/coronavirus/hcp/hospdischarge.pdf    [facility] clinical trials (COVID-19 research studies): [email or web address].[email or web address]/umn-clinical-trials     Below are the COVID-19 hotlines at the [facility] of [facility] ([facility]). Interpreters are available.   o For health questions: Call 927-257-6041 or 1-303.496.7024 (7 a.m. to 7 p.m.)  o For questions about schools and childcare: Call 371-895-5760 or 1-923.645.3515 (7 a.m. to 7 p.m.)                     Reason for Disposition    Puncture wound (hole through the skin) from cat (teeth or claws)    Additional Information    Negative: SEVERE  difficulty breathing (e.g., struggling for each breath, speaks in single words)    Negative: [1] SEVERE weakness (e.g., can't stand or can barely walk) AND [2] new-onset or WORSE    Negative: Difficult to awaken or acting confused (e.g., disoriented, slurred speech)    Negative: Bluish (or gray) lips or face now    Negative: Sounds like a life-threatening emergency to the triager    Negative: [1] Typical COVID-19 symptoms AND [2] lasting less than 3 weeks    Negative: [1] Chest pain, pressure, or tightness AND [2] new-onset or worsening    Negative: [1] Fever AND [2] new-onset or worsening    Negative: [1] MODERATE difficulty breathing (e.g., speaks in phrases, SOB even at rest, pulse 100-120) AND [2] new-onset or WORSE    Negative: [1] MODERATE difficulty breathing AND [2] oxygen level (e.g., pulse oximetry) 91 to 94 percent    Negative: Oxygen level (e.g., pulse oximetry) 90 percent or lower    Negative: MODERATE difficulty breathing (e.g., speaks in phrases, SOB even at rest, pulse 100-120)    Negative: [1] Drinking very little AND [2] dehydration suspected (e.g., no urine > 12 hours, very dry mouth, very lightheaded)    Negative: Patient sounds very sick or weak to the triager    Negative: [1] MILD difficulty breathing (e.g., minimal/no SOB at rest, SOB with walking, pulse <100) AND [2] new-onset    Negative: Oxygen level (e.g., pulse oximetry) 91 to 94 percent    Negative: [1] PERSISTING SYMPTOMS OF COVID-19 AND [2] NEW symptom AND [3] could be serious    Negative: [1] Caller has URGENT question AND [2] triager unable to answer question    Negative: [1] PERSISTING SYMPTOMS OF COVID-19 AND [2] symptoms WORSE    Negative: [1] Caller has NON-URGENT question AND [2] triager unable to answer    Negative: Major bleeding (actively dripping or spurting) that can't be stopped    Negative: Sounds like a life-threatening emergency to the triager    Negative: Any break in skin from BITE (e.g., cut, puncture, or scratch)  and WILD animal at RISK FOR RABIES (e.g., bat, raccoon, cutler, skunk, coyote, other carnivores)    Negative: Any break in skin from BITE (e.g., cut, puncture, or scratch) and PET animal (e.g., dog, cat, or ferret) at risk for RABIES (e.g., sick, stray, unprovoked bite, developing country)    Negative: Any break in skin from BITE (e.g., cut, puncture, or scratch) and monkey    Negative: Cut (length > 1/8 inch or 3 mm) or skin tear and any animal    Negative: Bleeding won't stop after 10 minutes of direct pressure (using correct technique)    Negative: EXPOSURE of non-intact skin (e.g., exposed person has dermatitis, abrasion, wound) with animal BODY FLUID (e.g., saliva such as licking, blood, brain) and animal at high-risk for RABIES (e.g., bat, raccoon, cutler, skunk, coyote, other carnivores)    Negative: Sounds like a serious bite injury to the triager    Negative: SEVERE pain (e.g., excruciating)    Protocols used: ANIMAL BITE-A-OH, CORONAVIRUS (COVID-19) PERSISTING SYMPTOMS FOLLOW-UP CALL-A- 8.25.2021

## 2022-01-18 NOTE — PROGRESS NOTES
Angelic is a 66 year old who is being evaluated via a billable video visit.      How would you like to obtain your AVS? MyChart  If the video visit is dropped, the invitation should be resent by: Text  Will anyone else be joining your video visit? No    Video Start Time: 10:15 AM    Assessment & Plan     Cat bite, initial encounter    Infection due to 2019 novel coronavirus        Return in about 1 week (around 1/25/2022) for follow up if symptoms persist, change or worsen.    Joon Crowley PA-C  [facility]    Subjective   Angelic is a 66 year old who presents for the following health issues  accompanied by her self.    HPI     Patient presents with:  Covid Concern: tested positive for COVID on 01/13/22 and feels like it is moving to her chest  Cat Bite: cat bites all over body and scratches from her new cat yesterday      Patient feeling better but still has some coughing.  Reviewed that she can return to work with masking and distancing measures as long as she is afebrile.  Care of cat bites reviewed.     Review of Systems   Constitutional, HEENT, cardiovascular, pulmonary, gi and gu systems are negative, except as otherwise noted.      Objective           Vitals:  No vitals were obtained today due to virtual visit.    Physical Exam   GENERAL: Healthy, alert and no distress  EYES: Eyes grossly normal to inspection.  No discharge or erythema, or obvious scleral/conjunctival abnormalities.  RESP: No audible wheeze, cough, or visible cyanosis.  No visible retractions or increased work of breathing.    SKIN: Multiple puncture wounds of thigh, arm and hand noted with circumferential erythema noted, c/w cat bites.   NEURO: Cranial nerves grossly intact.  Mentation and speech appropriate for age.  PSYCH: Mentation appears normal, affect normal/bright, judgement and insight intact, normal speech and appearance well-groomed.                Video-Visit Details    Type of service:  Video  Visit    Video End Time:10:36 AM    Originating Location (pt. Location): Home    Distant Location (provider location):  [facility]     Platform used for Video Visit: Jorge

## 2022-03-12 ENCOUNTER — HEALTH MAINTENANCE LETTER (OUTPATIENT)
Age: 67
End: 2022-03-12

## 2022-04-22 ENCOUNTER — OFFICE VISIT (OUTPATIENT)
Dept: INTERNAL MEDICINE | Facility: CLINIC | Age: 67
End: 2022-04-22
Payer: COMMERCIAL

## 2022-04-22 VITALS
OXYGEN SATURATION: 98 % | HEIGHT: 68 IN | WEIGHT: 161 LBS | DIASTOLIC BLOOD PRESSURE: 85 MMHG | SYSTOLIC BLOOD PRESSURE: 143 MMHG | HEART RATE: 77 BPM | BODY MASS INDEX: 24.4 KG/M2

## 2022-04-22 DIAGNOSIS — Z23 NEED FOR PNEUMOCOCCAL VACCINATION: ICD-10-CM

## 2022-04-22 DIAGNOSIS — F32.5 MAJOR DEPRESSIVE DISORDER IN FULL REMISSION, UNSPECIFIED WHETHER RECURRENT (H): ICD-10-CM

## 2022-04-22 DIAGNOSIS — R53.83 FATIGUE, UNSPECIFIED TYPE: ICD-10-CM

## 2022-04-22 DIAGNOSIS — Z00.00 MEDICARE ANNUAL WELLNESS VISIT, INITIAL: Primary | ICD-10-CM

## 2022-04-22 DIAGNOSIS — Z13.220 SCREENING FOR LIPOID DISORDERS: ICD-10-CM

## 2022-04-22 DIAGNOSIS — E78.5 HYPERLIPIDEMIA LDL GOAL <130: ICD-10-CM

## 2022-04-22 DIAGNOSIS — R00.2 PALPITATIONS: ICD-10-CM

## 2022-04-22 DIAGNOSIS — B00.9 HSV (HERPES SIMPLEX VIRUS) INFECTION: ICD-10-CM

## 2022-04-22 DIAGNOSIS — M81.0 AGE-RELATED OSTEOPOROSIS WITHOUT CURRENT PATHOLOGICAL FRACTURE: ICD-10-CM

## 2022-04-22 PROCEDURE — G0402 INITIAL PREVENTIVE EXAM: HCPCS | Performed by: NURSE PRACTITIONER

## 2022-04-22 PROCEDURE — G0403 EKG FOR INITIAL PREVENT EXAM: HCPCS | Performed by: NURSE PRACTITIONER

## 2022-04-22 PROCEDURE — G0009 ADMIN PNEUMOCOCCAL VACCINE: HCPCS | Performed by: NURSE PRACTITIONER

## 2022-04-22 PROCEDURE — 90732 PPSV23 VACC 2 YRS+ SUBQ/IM: CPT | Performed by: NURSE PRACTITIONER

## 2022-04-22 PROCEDURE — 99214 OFFICE O/P EST MOD 30 MIN: CPT | Mod: 25 | Performed by: NURSE PRACTITIONER

## 2022-04-22 RX ORDER — VALACYCLOVIR HYDROCHLORIDE 500 MG/1
500 TABLET, FILM COATED ORAL 2 TIMES DAILY
Qty: 6 TABLET | Refills: 6 | Status: SHIPPED | OUTPATIENT
Start: 2022-04-22 | End: 2023-04-03

## 2022-04-22 RX ORDER — ATORVASTATIN CALCIUM 20 MG/1
20 TABLET, FILM COATED ORAL DAILY
Qty: 90 TABLET | Refills: 3 | Status: SHIPPED | OUTPATIENT
Start: 2022-04-22 | End: 2023-04-25

## 2022-04-22 RX ORDER — SERTRALINE HYDROCHLORIDE 100 MG/1
150 TABLET, FILM COATED ORAL DAILY
Qty: 135 TABLET | Refills: 3 | Status: SHIPPED | OUTPATIENT
Start: 2022-04-22 | End: 2023-04-25

## 2022-04-22 ASSESSMENT — ENCOUNTER SYMPTOMS
MYALGIAS: 0
CONSTIPATION: 0
SHORTNESS OF BREATH: 0
ARTHRALGIAS: 0
ABDOMINAL PAIN: 0
DIARRHEA: 0
COUGH: 0
FREQUENCY: 0
NAUSEA: 0
JOINT SWELLING: 0
HEMATOCHEZIA: 0
FEVER: 0
HEARTBURN: 0
PALPITATIONS: 1
CHILLS: 0
PARESTHESIAS: 0
BREAST MASS: 0
EYE PAIN: 0
WEAKNESS: 0
HEMATURIA: 0
DYSURIA: 0
NERVOUS/ANXIOUS: 0
SORE THROAT: 0
HEADACHES: 0
DIZZINESS: 1

## 2022-04-22 ASSESSMENT — ACTIVITIES OF DAILY LIVING (ADL): CURRENT_FUNCTION: NO ASSISTANCE NEEDED

## 2022-04-22 NOTE — PATIENT INSTRUCTIONS
Patient Education   Personalized Prevention Plan  You are due for the preventive services outlined below.  Your care team is available to assist you in scheduling these services.  If you have already completed any of these items, please share that information with your care team to update in your medical record.  Health Maintenance Due   Topic Date Due    ANNUAL REVIEW OF HM ORDERS  Never done    Discuss Advance Care Planning  Never done    HIV Screening  Never done    COVID-19 Vaccine (3 - Booster for Moderna series) 09/15/2021    FALL RISK ASSESSMENT  Never done    Depression Assessment  03/08/2022    Pneumococcal Vaccine (1 of 1 - PPSV23) 02/06/2022     -Mammogram is due in July  -Colonoscopy due next year  -Someone will call you to schedule cardiac monitor placement  -Referral to Cardiology- someone will call you to schedule  -Medications refilled today  -Labs today- will be in touch with you regarding results  -Pneumonia shot today

## 2022-04-22 NOTE — PROGRESS NOTES
"SUBJECTIVE:   Angelic Correa is a 67 year old female who presents for Preventive Visit.    Split Bill scripting  The purpose of this visit is to discuss your medical history and prevent health problems before you are sick. You may be responsible for a co-pay, coinsurance, or deductible if your visit today includes services such as checking on a sore throat, having an x-ray or lab test, or treating and evaluating a new or existing condition     Patient has been advised of split billing requirements and indicates understanding: Yes     Are you in the first 12 months of your Medicare coverage?  Yes,  Visual Acuity:  Right Eye: 20/20   Left Eye: 20/20  Both Eyes: 20/20    Healthy Habits:     In general, how would you rate your overall health?  Good    Frequency of exercise:  2-3 days/week    Duration of exercise:  30-45 minutes    Do you usually eat at least 4 servings of fruit and vegetables a day, include whole grains    & fiber and avoid regularly eating high fat or \"junk\" foods?  No    Taking medications regularly:  Yes    Medication side effects:  Lightheadedness    Ability to successfully perform activities of daily living:  No assistance needed    Home Safety:  No safety concerns identified    Hearing Impairment:  No hearing concerns    In the past 6 months, have you been bothered by leaking of urine?  No    In general, how would you rate your overall mental or emotional health?  Good      PHQ-2 Total Score: 0    Additional concerns today:  Yes    Health Maintenance Screening:    -Immunizations:   -Influenza: Up-to-date   -Pneumococcal: Due   -Td/Tdap: Up-to-date   -Shingles: Up-to-date   -HPV: N/A   -MMR: N/A   -COVID patient has received Moderna x2.  He is very hesitant to receive another vaccine as she reports that with her first vaccine she got \"covid arm\"- redness/swelling for 4-5 days after vaccination.  On her second vaccination she reports that she got mild \"COVID symptoms:, endorsing fevers/chills, body " aches.  Of note, patient had COVID illness infection in January, 2022.    -Colon Cancer Screening:  Up to date; patient had a colonoscopy in 2018 at [facility] where several polyps were removed, including a tubular adenoma and hyperplastic polyp.  No high-grade dysplasia noted.  Recommended a follow-up in 5 years.  She is due in April, 2023.    -Lung Cancer Screening: N/A    -Breast Cancer Screening: Up-to-date    -Cervical Cancer Screening: N/A, aged out.  Per chart review her last 3 Pap smears were normal    -STD Screen: N/A, low risk    -Cholesterol Screening: Due; treated, atorvastatin 20 mg/day    -Bone Density:  Due; hx of osteoporosis- states she did not tolerate medication therapy due to a reaction. Per chart review, appears she was on Fosamax in the past.     -Diabetes Screening: Due    -Depression Screening:  Due; treated- with sertraline 150 mg daily.  Endorses mood is stable and depression is in remission.    PHQ-2 Score:   PHQ-2 ( 1999 Pfizer) 4/22/2022 4/22/2022   Q1: Little interest or pleasure in doing things 0 0   Q2: Feeling down, depressed or hopeless 0 0   PHQ-2 Score 0 0   PHQ-2 Total Score (12-17 Years)- Positive if 3 or more points; Administer PHQ-A if positive - -   Q1: Little interest or pleasure in doing things Not at all -   Q2: Feeling down, depressed or hopeless Not at all -   PHQ-2 Score 0 -     PATIENT HEALTH QUESTIONNAIRE-9 (PHQ - 9)    Over the last 2 weeks, how often have you been bothered by any of the following problems?    1. Little interest or pleasure in doing things -      2. Feeling down, depressed, or hopeless -      3. Trouble falling or staying asleep, or sleeping too much -     4. Feeling tired or having little energy -      5. Poor appetite or overeating -      6. Feeling bad about yourself - or that you are a failure or have let yourself or your family down -      7. Trouble concentrating on things, such as reading the newspaper or watching television -     8. Moving  or speaking so slowly that other people could have noticed? Or the opposite - being so fidgety or restless that you have been moving around a lot more than usual     9. Thoughts that you would be better off dead or of hurting  yourself in some way     Total Score:       If you checked off any problems, how difficult have these problems made it for you to do your work, take care of things at home, or get along with other people?      Developed by Doretha Christianson, Geneva Beckman, Jim Jj and colleagues, with an educational nhi from [employer] No permission required to reproduce, translate, display or distribute. permission required to reproduce, translate, display or distribute.    Other Concerns:    Palpitations:  Endorses palpitations.  They have been going on for a while.  Heart feels like it is fluttering.  Was seen in clinic in September an EKG was done at that time that showed sinus rhythm.  She denies any chest pain, dizziness, shortness of breath when these episodes occur.  It occurs randomly with no particular inciting incident.  Does note that her mother has a history of arrhythmia which causes concern for the patient.  She would also like a referral to see cardiology regarding this concern.    She is wondering if this is related to her COVID illness in January and wonders if perhaps this is related to long-haul COVID symptoms.    Fatigue:  Reports feeling tired.  States she will sleep usually 9 to 10 hours but lately has been sleeping 10 to 11 hours per night.  Feels tired.  Her significant other who is with her today states that she does not snore in her sleep.  Does note that when she is sleeping her arms and her legs move around frequently.  She is wondering if her sertraline is making her feel lethargic?    Do you feel safe in your environment? Yes    Have you ever done Advance Care Planning? (For example, a Health Directive, POLST, or a discussion with a medical provider or your  loved ones about your wishes): Yes, patient states has an Advance Care Planning document and will bring a copy to the clinic.    Fall risk   Had a fall today, none remainder of year    Cognitive Screening   1) Repeat 3 items (Leader, Season, Table)    2) Clock draw: NORMAL  3) 3 item recall: Recalls 3 objects  Results: 3 items recalled: COGNITIVE IMPAIRMENT LESS LIKELY    Mini-CogTM Copyright MARINE Hernandez. Licensed by the author for use in [facility]; reprinted with permission (jesu@[email or web address]). All rights reserved.      Do you have sleep apnea, excessive snoring or daytime drowsiness?: no    Reviewed and updated as needed this visit by clinical staff                    Reviewed and updated as needed this visit by Provider                   Social History     Tobacco Use     Smoking status: Former Smoker     Quit date: 1996     Years since quittin.3     Smokeless tobacco: Never Used   Substance Use Topics     Alcohol use: Yes     If you drink alcohol do you typically have >3 drinks per day or >7 drinks per week? No    Alcohol Use 2019   Prescreen: >3 drinks/day or >7 drinks/week? Yes   Prescreen: >3 drinks/day or >7 drinks/week? -   AUDIT SCORE  5     AUDIT - Alcohol Use Disorders Identification Test - Reproduced from the World Health Organization Audit 2001 (Second Edition) 2022   1.  How often do you have a drink containing alcohol? 2 to 3 times a week   2.  How many drinks containing alcohol do you have on a typical day when you are drinking? 1 or 2   3.  How often do you have five or more drinks on one occasion? Less than monthly   4.  How often during the last year have you found that you were not able to stop drinking once you had started? Never   5.  How often during the last year have you failed to do what was normally expected of you because of drinking? Never   6.  How often during the last year have you needed a first drink in the morning to get yourself going after a heavy  drinking session? Never   7.  How often during the last year have you had a feeling of guilt or remorse after drinking? Never   8.  How often during the last year have you been unable to remember what happened the night before because of your drinking? Never   9.  Have you or someone else been injured because of your drinking? No   10. Has a relative, friend, doctor or other health care worker been concerned about your drinking or suggested you cut down? No   TOTAL SCORE 4       Current providers sharing in care for this patient include:   Patient Care Team:  Priscila Rodriguez PA-C as PCP - General (Physician Assistant)  Marilou Bishop (Internal Medicine)  Janine Buenrostro MD as MD (Ophthalmology)  Bridget Moscoso MD as MD (Ophthalmology)  Shey Mcnulty PA-C as Physician Assistant (Dermatology)  Joon Crowley PA-C as Assigned PCP    The following health maintenance items are reviewed in Epic and correct as of today:  Health Maintenance Due   Topic Date Due     ANNUAL REVIEW OF HM ORDERS  Never done     ADVANCE CARE PLANNING  Never done     HIV SCREENING  Never done     COVID-19 Vaccine (3 - Booster for Moderna series) 09/15/2021     MEDICARE ANNUAL WELLNESS VISIT  02/06/2022     FALL RISK ASSESSMENT  Never done     PHQ-9  03/08/2022     Pneumococcal Vaccine: 65+ Years (1 of 1 - PPSV23) 02/06/2022     Labs reviewed in EPIC    Any new diagnosis of family breast, ovarian, or bowel cancer? No    FHS-7: No flowsheet data found.    Mammogram Screening: Recommended mammography every 1-2 years with patient discussion and risk factor consideration  Pertinent mammograms are reviewed under the imaging tab.    Review of Systems   Constitutional: Negative for chills and fever.   HENT: Negative for congestion, ear pain, hearing loss and sore throat.    Eyes: Negative for pain and visual disturbance.   Respiratory: Negative for cough and shortness of breath.    Cardiovascular: Positive for  "palpitations. Negative for chest pain and peripheral edema.   Gastrointestinal: Negative for abdominal pain, constipation, diarrhea, heartburn, hematochezia and nausea.   Breasts:  Negative for tenderness, breast mass and discharge.   Genitourinary: Negative for dysuria, frequency, genital sores, hematuria, pelvic pain, urgency, vaginal bleeding and vaginal discharge.   Musculoskeletal: Negative for arthralgias, joint swelling and myalgias.   Skin: Negative for rash.   Neurological: Positive for dizziness. Negative for weakness, headaches and paresthesias.   Psychiatric/Behavioral: Negative for mood changes. The patient is not nervous/anxious.      CONSTITUTIONAL: NEGATIVE for fever, chills, change in weight  INTEGUMENTARY/SKIN: NEGATIVE for worrisome rashes, moles or lesions  EYES: NEGATIVE for vision changes or irritation  ENT/MOUTH: NEGATIVE for ear, mouth and throat problems  RESP: NEGATIVE for significant cough or SOB  BREAST: NEGATIVE for masses, tenderness or discharge  CV: POSITIVE for palpitations; negative for chest pain  GI: NEGATIVE for nausea, abdominal pain, heartburn, or change in bowel habits  : NEGATIVE for frequency, dysuria, or hematuria  MUSCULOSKELETAL: NEGATIVE for significant arthralgias or myalgia  NEURO: NEGATIVE for weakness, dizziness or paresthesias and POSITIVE for tremor and hand intermittent, states is not bad this time  ENDOCRINE: NEGATIVE for temperature intolerance, skin/hair changes  HEME: NEGATIVE for bleeding problems  PSYCHIATRIC: NEGATIVE for changes in mood or affect    OBJECTIVE:   BP (!) 143/85   Pulse 77   Ht 1.715 m (5' 7.5\")   Wt 73 kg (161 lb)   SpO2 98%   BMI 24.84 kg/m   Estimated body mass index is 24.84 kg/m  as calculated from the following:    Height as of this encounter: 1.715 m (5' 7.5\").    Weight as of this encounter: 73 kg (161 lb).     Physical Exam  GENERAL APPEARANCE: healthy, alert and no distress  EYES: Eyes grossly normal to inspection, PERRL and " conjunctivae and sclerae normal  HENT: ear canals and TM's normal, nose and mouth without ulcers or lesions, oropharynx clear and oral mucous membranes moist  NECK: no adenopathy, no asymmetry, masses, or scars and thyroid normal to palpation  RESP: lungs clear to auscultation - no rales, rhonchi or wheezes  BREAST: Declines breast exam  CV: regular rate and rhythm, normal S1 S2, no S3 or S4, no murmur, click or rub, no peripheral edema and peripheral pulses strong  ABDOMEN: soft, nontender, no hepatosplenomegaly, no masses and bowel sounds normal  MS: no musculoskeletal defects are noted and gait is age appropriate without ataxia  SKIN: no suspicious lesions or rashes  NEURO: Normal strength and tone, sensory exam grossly normal, mentation intact and speech normal  PSYCH: mentation appears normal and affect normal/bright    Diagnostic Test Results:  Labs reviewed in Epic    ASSESSMENT / PLAN:   Angelic was seen today for physical and recheck medication.    Diagnoses and all orders for this visit:    Medicare annual wellness visit, initial  -     REVIEW OF HEALTH MAINTENANCE PROTOCOL ORDERS  -     Mammogram due in July  -     Colonoscopy due next year  -     Pap no longer needs- discussed with patient  -     Labs in future- patient will schedule lab visit when fasting  -     Pneumococcal vaccine today  -     Discussed concerns noting getting COVID booster.  I recommend that she get it.  However I understand her concerns and she will consider to think about this    Palpitations  -     Adult Cardiac Event Monitor; Future  -     EKG 12-lead complete w/read - Clinics  -     Comprehensive metabolic panel (BMP + Alb, Alk Phos, ALT, AST, Total. Bili, TP); Future  -     Glucose; Future  -     Adult Cardiology Eval  Referral; Future  -     RRR on exam; EKG in clinic shows NSR  -     Will get event monitor  -     Referral to Cardiology per requst    Age-related osteoporosis without current pathological fracture  -      "DX Hip/Pelvis/Spine; Future    Major depressive disorder in full remission, unspecified whether recurrent (H)  -     sertraline (ZOLOFT) 100 MG tablet; Take 1.5 tablets (150 mg) by mouth daily  -     Mood stable- continue; refilled    Fatigue, unspecified type  -     Comprehensive metabolic panel (BMP + Alb, Alk Phos, ALT, AST, Total. Bili, TP); Future  -     Glucose; Future  -     TSH with free T4 reflex; Future  -     Hemoglobin; Future  -     Check labs    Screening for lipoid disorders  -     Lipid Profile (Chol, Trig, HDL, LDL calc); Future    Hyperlipidemia LDL goal <130  -     atorvastatin (LIPITOR) 20 MG tablet; Take 1 tablet (20 mg) by mouth daily- Refilled med    HSV (herpes simplex virus) infection  -     valACYclovir (VALTREX) 500 MG tablet; Take 1 tablet (500 mg) by mouth 2 times daily- Takes PRN- refilled med    Need for pneumococcal vaccination  -     Pneumococcal vaccine 23 valent PPSV23  (Pneumovax) [54207]        Patient has been advised of split billing requirements and indicates understanding: Yes    COUNSELING:  Reviewed preventive health counseling, as reflected in patient instructions    Estimated body mass index is 22.53 kg/m  as calculated from the following:    Height as of 3/8/21: 1.715 m (5' 7.5\").    Weight as of 9/8/21: 66.2 kg (146 lb).        She reports that she quit smoking about 26 years ago. She has never used smokeless tobacco.      Appropriate preventive services were discussed with this patient, including applicable screening as appropriate for cardiovascular disease, diabetes, osteopenia/osteoporosis, and glaucoma.  As appropriate for age/gender, discussed screening for colorectal cancer, prostate cancer, breast cancer, and cervical cancer. Checklist reviewing preventive services available has been given to the patient.    Reviewed patients plan of care and provided an AVS. The Basic Care Plan (routine screening as documented in Health Maintenance) for Angelic meets the Care Plan " requirement. This Care Plan has been established and reviewed with the Patient.    Counseling Resources:  ATP IV Guidelines  Pooled Cohorts Equation Calculator  Breast Cancer Risk Calculator  Breast Cancer: Medication to Reduce Risk  FRAX Risk Assessment  ICSI Preventive Guidelines  Dietary Guidelines for Americans, 2010  USDA's MyPlate  ASA Prophylaxis  Lung CA Screening    PEYMAN Wallace [facility]    Note was completed, in part, with Dragon voice recognition software.  Documentation was reviewed but some grammatical, spelling, and word errors may remain.      Identified Health Risks:

## 2022-04-27 ENCOUNTER — OFFICE VISIT (OUTPATIENT)
Dept: CARDIOLOGY | Facility: CLINIC | Age: 67
End: 2022-04-27
Attending: NURSE PRACTITIONER
Payer: COMMERCIAL

## 2022-04-27 VITALS
OXYGEN SATURATION: 98 % | DIASTOLIC BLOOD PRESSURE: 80 MMHG | SYSTOLIC BLOOD PRESSURE: 146 MMHG | BODY MASS INDEX: 24.16 KG/M2 | HEIGHT: 68 IN | HEART RATE: 70 BPM | WEIGHT: 159.4 LBS

## 2022-04-27 DIAGNOSIS — E78.01 FAMILIAL HYPERCHOLESTEREMIA: ICD-10-CM

## 2022-04-27 DIAGNOSIS — R03.0 ELEVATED BLOOD PRESSURE READING WITHOUT DIAGNOSIS OF HYPERTENSION: ICD-10-CM

## 2022-04-27 DIAGNOSIS — R00.2 PALPITATIONS: Primary | ICD-10-CM

## 2022-04-27 PROCEDURE — 99204 OFFICE O/P NEW MOD 45 MIN: CPT | Performed by: INTERNAL MEDICINE

## 2022-04-27 PROCEDURE — 93000 ELECTROCARDIOGRAM COMPLETE: CPT | Performed by: INTERNAL MEDICINE

## 2022-04-27 NOTE — LETTER
4/27/2022    Graceharriet Walton, APRN CNP  [address]  [place] [address]    RE: Angelic Correa       Dear Colleague,     I had the pleasure of seeing Angelic Correa in the [facility].  CARDIOLOGY CLINIC CONSULTATION      REASON FOR CONSULT:   Palpitation    PRIMARY CARE PHYSICIAN:  Grace Walton        History of Present Illness   Angelic Correa is an extremely pleasant 67 year old female here as a new patient to discuss her palpitations.  She is here with her  today, who is a paramedic.  She has a past medical history significant for severe dyslipidemia (consistent with heterozygous familial hyperlipidemia), elevated blood pressure without diagnosis of hypertension, and depression.  Her family history is significant for her mother having some type of arrhythmia, though she is not sure which arrhythmia exactly.  She is a former smoker, having quit greater than 20 years ago.  She drinks 2 cups of coffee each morning, and 2-3 glasses of wine per day.    She presents for evaluation today due to around a year of off and on palpitations.  These episodes vary significantly in their frequency, sometimes occurring multiple times per day, and other times with prolonged breaks in between.  Recently, she has been having them more frequently, and in fact is having an episode now during our visit.  The episodes typically last for a few minutes at a time, sometimes up to an hour.  They feel like a fluttering sensation, sometimes associated with a bit of lightheadedness, but no syncope.  No chest pains.  No significant shortness of breath.  She does not exercise regularly at present, but in her daily life and going for walks, she does not think she has any excessive shortness of breath or chest discomfort with exertion.  No lower extremity swelling.  She has been monitoring her heart rate and ECG with a Kardia monitor on her iPhone, and this has mentioned several episodes concerning  for possible atrial fibrillation.    Thankfully, she was having an episode of palpitations in clinic today, and we were able to capture this on ECG.  This showed sinus rhythm with frequent PACs, but no evidence of atrial fibrillation or other concerning dysrhythmia.  Her most recent labs are from 9/8/2021, and show normal electrolytes, creatinine of 0.68 with estimated GFR greater than 90, total cholesterol 334, HDL of 84, LDL of 224, triglycerides of 131, TSH was normal at 0.83, and a normal CBC.  She does not have an echocardiogram in our system that I can see.      Assessment & Plan     1. Palpitations, uncertain etiology, likely sinus rhythm with frequent PACs, but intermittent atrial fibrillation not entirely ruled out  2. Severe dyslipidemia, consistent with heterozygous familial hyperlipidemia  3. Elevated blood pressure without diagnosis of hypertension  4. Depression  5. Former tobacco abuse (quit greater than 20 years ago)      It was a pleasure to speak with Angelic in clinic today.  We discussed the possible causes of her palpitations, in particular including the frequent PACs seen on her ECG, but also the possibility of atrial fibrillation or other more concerning dysrhythmias.  I recommended that we check a 7-day Zio patch to give us a firm diagnosis and make sure that she is not having occasional atrial fibrillation in addition to the PACs we saw on her ECG today.  I also recommended a transthoracic echocardiogram to look for any structural heart disease which may be triggering these PACs.  Based on the results of this, we can suggest further testing or treatment if necessary.  In the meantime, we also talked about the importance of minimizing or entirely reducing her alcohol and caffeine intake, which may be contributing to the frequency of these PACs.    In regards to her dyslipidemia, I explained that her LDL is very high, which is consistent with likely heterozygous familial hyperlipidemia.  I am  glad to see that she was started on a statin in September, and has been tolerating this well, but I do suspect that she will probably need a higher dose.  We will repeat a lipid panel now, and can make adjustments as needed.  We also talked about the importance of a healthy diet such as the Mediterranean diet.    Finally, in regards to her blood pressure, she tells me that it is usually much better controlled than this.  I asked her to please keep a home blood pressure log for 1 week, and send us the results, and we can discuss starting an antihypertensive medication if needed.      -7-day Zio patch  -TTE  -Lipid panel  -Continue Lipitor 20 mg daily for now, will adjust as needed  -Home blood pressure log x1 week      Follow-up: 1 month to review results of above testing      Bull Guevara MD  Interventional Cardiology  April 27, 2022        Medications   Current Outpatient Medications   Medication     atorvastatin (LIPITOR) 20 MG tablet     fluticasone (FLONASE) 50 MCG/ACT spray     sertraline (ZOLOFT) 100 MG tablet     valACYclovir (VALTREX) 500 MG tablet     No current facility-administered medications for this visit.     Allergies   Allergies   Allergen Reactions     Apraclonidine Nausea and Vomiting     Bupropion GI Disturbance and Nausea     Generic only     No Clinical Screening - See Comments      seasonal, cosmetic     Pilocarpine Nausea and Vomiting     Sulfa Drugs Unknown         Physical Exam       BP: (!) 146/80 Pulse: 70     SpO2: 98 %      Vital Signs with Ranges  Pulse:  [70] 70  BP: (146)/(80) 146/80  SpO2:  [98 %] 98 %  159 lbs 6.4 oz    Constitutional: Well-appearing, no acute distress  Respiratory: Normal respiratory effort, CTAB  Cardiovascular: Generally regular with normal rate, but with frequent early beats, no m/r/g.  JVP < 7 cm H2O.  There is no LE edema.  Normal carotid upstrokes, no carotid bruits.    Thank you for allowing me to participate in the care of your  patient.      Sincerely,     Bull Guevara MD   [facility]      cc:   PEYMAN Wallace CNP  [address]  [place] [address]

## 2022-04-27 NOTE — PROGRESS NOTES
CARDIOLOGY CLINIC CONSULTATION      REASON FOR CONSULT:   Palpitation    PRIMARY CARE PHYSICIAN:  Grace Walton        History of Present Illness   Angelic Correa is an extremely pleasant 67 year old female here as a new patient to discuss her palpitations.  She is here with her  today, who is a paramedic.  She has a past medical history significant for severe dyslipidemia (consistent with heterozygous familial hyperlipidemia), elevated blood pressure without diagnosis of hypertension, and depression.  Her family history is significant for her mother having some type of arrhythmia, though she is not sure which arrhythmia exactly.  She is a former smoker, having quit greater than 20 years ago.  She drinks 2 cups of coffee each morning, and 2-3 glasses of wine per day.    She presents for evaluation today due to around a year of off and on palpitations.  These episodes vary significantly in their frequency, sometimes occurring multiple times per day, and other times with prolonged breaks in between.  Recently, she has been having them more frequently, and in fact is having an episode now during our visit.  The episodes typically last for a few minutes at a time, sometimes up to an hour.  They feel like a fluttering sensation, sometimes associated with a bit of lightheadedness, but no syncope.  No chest pains.  No significant shortness of breath.  She does not exercise regularly at present, but in her daily life and going for walks, she does not think she has any excessive shortness of breath or chest discomfort with exertion.  No lower extremity swelling.  She has been monitoring her heart rate and ECG with a iZettledia monitor on her iPhone, and this has mentioned several episodes concerning for possible atrial fibrillation.    Thankfully, she was having an episode of palpitations in clinic today, and we were able to capture this on ECG.  This showed sinus rhythm with frequent PACs, but no evidence of atrial  fibrillation or other concerning dysrhythmia.  Her most recent labs are from 9/8/2021, and show normal electrolytes, creatinine of 0.68 with estimated GFR greater than 90, total cholesterol 334, HDL of 84, LDL of 224, triglycerides of 131, TSH was normal at 0.83, and a normal CBC.  She does not have an echocardiogram in our system that I can see.      Assessment & Plan     1. Palpitations, uncertain etiology, likely sinus rhythm with frequent PACs, but intermittent atrial fibrillation not entirely ruled out  2. Severe dyslipidemia, consistent with heterozygous familial hyperlipidemia  3. Elevated blood pressure without diagnosis of hypertension  4. Depression  5. Former tobacco abuse (quit greater than 20 years ago)      It was a pleasure to speak with Angelic in clinic today.  We discussed the possible causes of her palpitations, in particular including the frequent PACs seen on her ECG, but also the possibility of atrial fibrillation or other more concerning dysrhythmias.  I recommended that we check a 7-day Zio patch to give us a firm diagnosis and make sure that she is not having occasional atrial fibrillation in addition to the PACs we saw on her ECG today.  I also recommended a transthoracic echocardiogram to look for any structural heart disease which may be triggering these PACs.  Based on the results of this, we can suggest further testing or treatment if necessary.  In the meantime, we also talked about the importance of minimizing or entirely reducing her alcohol and caffeine intake, which may be contributing to the frequency of these PACs.    In regards to her dyslipidemia, I explained that her LDL is very high, which is consistent with likely heterozygous familial hyperlipidemia.  I am glad to see that she was started on a statin in September, and has been tolerating this well, but I do suspect that she will probably need a higher dose.  We will repeat a lipid panel now, and can make adjustments as needed.   We also talked about the importance of a healthy diet such as the Mediterranean diet.    Finally, in regards to her blood pressure, she tells me that it is usually much better controlled than this.  I asked her to please keep a home blood pressure log for 1 week, and send us the results, and we can discuss starting an antihypertensive medication if needed.      -7-day Zio patch  -TTE  -Lipid panel  -Continue Lipitor 20 mg daily for now, will adjust as needed  -Home blood pressure log x1 week      Follow-up: 1 month to review results of above testing      Bull Guevara MD  Interventional Cardiology  April 27, 2022        Medications   Current Outpatient Medications   Medication     atorvastatin (LIPITOR) 20 MG tablet     fluticasone (FLONASE) 50 MCG/ACT spray     sertraline (ZOLOFT) 100 MG tablet     valACYclovir (VALTREX) 500 MG tablet     No current facility-administered medications for this visit.     Allergies   Allergies   Allergen Reactions     Apraclonidine Nausea and Vomiting     Bupropion GI Disturbance and Nausea     Generic only     No Clinical Screening - See Comments      seasonal, cosmetic     Pilocarpine Nausea and Vomiting     Sulfa Drugs Unknown         Physical Exam       BP: (!) 146/80 Pulse: 70     SpO2: 98 %      Vital Signs with Ranges  Pulse:  [70] 70  BP: (146)/(80) 146/80  SpO2:  [98 %] 98 %  159 lbs 6.4 oz    Constitutional: Well-appearing, no acute distress  Respiratory: Normal respiratory effort, CTAB  Cardiovascular: Generally regular with normal rate, but with frequent early beats, no m/r/g.  JVP < 7 cm H2O.  There is no LE edema.  Normal carotid upstrokes, no carotid bruits.

## 2022-05-05 ENCOUNTER — HOSPITAL ENCOUNTER (OUTPATIENT)
Dept: CARDIOLOGY | Facility: CLINIC | Age: 67
Discharge: HOME | End: 2022-05-05
Attending: INTERNAL MEDICINE | Admitting: INTERNAL MEDICINE
Payer: COMMERCIAL

## 2022-05-05 DIAGNOSIS — R00.2 PALPITATIONS: ICD-10-CM

## 2022-05-05 DIAGNOSIS — E78.01 FAMILIAL HYPERCHOLESTEREMIA: ICD-10-CM

## 2022-05-05 DIAGNOSIS — R03.0 ELEVATED BLOOD PRESSURE READING WITHOUT DIAGNOSIS OF HYPERTENSION: ICD-10-CM

## 2022-05-05 PROCEDURE — 93242 EXT ECG>48HR<7D RECORDING: CPT

## 2022-05-05 PROCEDURE — 93244 EXT ECG>48HR<7D REV&INTERPJ: CPT | Performed by: INTERNAL MEDICINE

## 2022-05-13 ENCOUNTER — LAB (OUTPATIENT)
Dept: LAB | Facility: CLINIC | Age: 67
End: 2022-05-13
Payer: COMMERCIAL

## 2022-05-13 DIAGNOSIS — Z11.4 SCREENING FOR HIV (HUMAN IMMUNODEFICIENCY VIRUS): Primary | ICD-10-CM

## 2022-05-13 DIAGNOSIS — R53.83 FATIGUE, UNSPECIFIED TYPE: ICD-10-CM

## 2022-05-13 DIAGNOSIS — Z13.220 SCREENING FOR LIPOID DISORDERS: ICD-10-CM

## 2022-05-13 DIAGNOSIS — R00.2 PALPITATIONS: ICD-10-CM

## 2022-05-13 LAB
ALBUMIN SERPL-MCNC: 4 G/DL (ref 3.4–5)
ALP SERPL-CCNC: 96 U/L (ref 40–150)
ALT SERPL W P-5'-P-CCNC: 26 U/L (ref 0–50)
ANION GAP SERPL CALCULATED.3IONS-SCNC: 9 MMOL/L (ref 3–14)
AST SERPL W P-5'-P-CCNC: 20 U/L (ref 0–45)
BILIRUB SERPL-MCNC: 0.6 MG/DL (ref 0.2–1.3)
BUN SERPL-MCNC: 16 MG/DL (ref 7–30)
CALCIUM SERPL-MCNC: 8.9 MG/DL (ref 8.5–10.1)
CHLORIDE BLD-SCNC: 107 MMOL/L (ref 94–109)
CHOLEST SERPL-MCNC: 176 MG/DL
CO2 SERPL-SCNC: 24 MMOL/L (ref 20–32)
CREAT SERPL-MCNC: 0.66 MG/DL (ref 0.52–1.04)
FASTING STATUS PATIENT QL REPORTED: YES
FASTING STATUS PATIENT QL REPORTED: YES
GFR SERPL CREATININE-BSD FRML MDRD: >90 ML/MIN/1.73M2
GLUCOSE BLD-MCNC: 92 MG/DL (ref 70–99)
GLUCOSE BLD-MCNC: 92 MG/DL (ref 70–99)
HDLC SERPL-MCNC: 97 MG/DL
HGB BLD-MCNC: 12.6 G/DL (ref 11.7–15.7)
HIV 1+2 AB+HIV1 P24 AG SERPL QL IA: NONREACTIVE
LDLC SERPL CALC-MCNC: 54 MG/DL
NONHDLC SERPL-MCNC: 79 MG/DL
POTASSIUM BLD-SCNC: 4 MMOL/L (ref 3.4–5.3)
PROT SERPL-MCNC: 7.7 G/DL (ref 6.8–8.8)
SODIUM SERPL-SCNC: 140 MMOL/L (ref 133–144)
TRIGL SERPL-MCNC: 126 MG/DL
TSH SERPL DL<=0.005 MIU/L-ACNC: 1.15 MU/L (ref 0.4–4)

## 2022-05-13 PROCEDURE — 87389 HIV-1 AG W/HIV-1&-2 AB AG IA: CPT

## 2022-05-13 PROCEDURE — 36415 COLL VENOUS BLD VENIPUNCTURE: CPT

## 2022-05-13 PROCEDURE — 80061 LIPID PANEL: CPT

## 2022-05-13 PROCEDURE — 85018 HEMOGLOBIN: CPT

## 2022-05-13 PROCEDURE — 84443 ASSAY THYROID STIM HORMONE: CPT

## 2022-05-13 PROCEDURE — 80053 COMPREHEN METABOLIC PANEL: CPT

## 2022-05-19 ENCOUNTER — TELEPHONE (OUTPATIENT)
Dept: CARDIOLOGY | Facility: CLINIC | Age: 67
End: 2022-05-19
Payer: COMMERCIAL

## 2022-05-19 NOTE — TELEPHONE ENCOUNTER
Ziopatch monitor reviewed by Dr. Guevara:     Bull Guevara MD  [facility] Heart Team 1  No dangerous rhythm issues on Ziopatch, but she is having very frequent SVT and PAC's which are benign but could explain her symptoms.  Let's start Toprol XL 25 daily, and then after the TTE we can see her in clinic to discuss.     Thanks,   Ramirez     Called pt, no answer. Left  requesting call back to Team 1 to review results. Pt has echo scheduled on 6/13/22 and follow up with Dr. Guevara on 6/20/22.

## 2022-05-31 NOTE — TELEPHONE ENCOUNTER
Received call back from pt but unable to connect when returning call.     Left detailed voicemail for pt regarding Dr. Guevara's recommendation to start metoprolol XL 25mg daily.   Left team 1's call back number if pt would like to start prior to follow up on 6/20/22.

## 2022-06-02 NOTE — TELEPHONE ENCOUNTER
Spoke with pt about recommendations and she reports that she has not been having many symptoms. Pt would rather wait to see what the echo shows and discuss more at her follow up with Dr. Guevara.

## 2022-06-06 NOTE — TELEPHONE ENCOUNTER
Message left for pt to call MFM clinic at [phone] to schedule MFM consult with Dr. Arellano.    PHQ 9 and SHIRLEY 7 sent via my chart.    Esther CELESTIN RN  EP Triage

## 2022-06-13 ENCOUNTER — HOSPITAL ENCOUNTER (OUTPATIENT)
Dept: CARDIOLOGY | Facility: CLINIC | Age: 67
Discharge: HOME | End: 2022-06-13
Attending: INTERNAL MEDICINE | Admitting: INTERNAL MEDICINE
Payer: COMMERCIAL

## 2022-06-13 DIAGNOSIS — R00.2 PALPITATIONS: ICD-10-CM

## 2022-06-13 DIAGNOSIS — R03.0 ELEVATED BLOOD PRESSURE READING WITHOUT DIAGNOSIS OF HYPERTENSION: ICD-10-CM

## 2022-06-13 DIAGNOSIS — E78.01 FAMILIAL HYPERCHOLESTEREMIA: ICD-10-CM

## 2022-06-13 LAB — LVEF ECHO: NORMAL

## 2022-06-13 PROCEDURE — 93306 TTE W/DOPPLER COMPLETE: CPT | Mod: 26 | Performed by: INTERNAL MEDICINE

## 2022-06-13 PROCEDURE — 93306 TTE W/DOPPLER COMPLETE: CPT

## 2022-06-15 ENCOUNTER — OFFICE VISIT (OUTPATIENT)
Dept: URGENT CARE | Facility: URGENT CARE | Age: 67
End: 2022-06-15
Payer: COMMERCIAL

## 2022-06-15 VITALS
BODY MASS INDEX: 24.23 KG/M2 | DIASTOLIC BLOOD PRESSURE: 97 MMHG | TEMPERATURE: 96 F | OXYGEN SATURATION: 100 % | WEIGHT: 157 LBS | SYSTOLIC BLOOD PRESSURE: 162 MMHG | HEART RATE: 66 BPM

## 2022-06-15 DIAGNOSIS — N39.0 ACUTE UTI: Primary | ICD-10-CM

## 2022-06-15 DIAGNOSIS — R30.0 DYSURIA: ICD-10-CM

## 2022-06-15 LAB
ALBUMIN UR-MCNC: NEGATIVE MG/DL
APPEARANCE UR: CLEAR
BACTERIA #/AREA URNS HPF: ABNORMAL /HPF
BILIRUB UR QL STRIP: NEGATIVE
CLUE CELLS: ABNORMAL
COLOR UR AUTO: YELLOW
GLUCOSE UR STRIP-MCNC: NEGATIVE MG/DL
HGB UR QL STRIP: ABNORMAL
KETONES UR STRIP-MCNC: NEGATIVE MG/DL
LEUKOCYTE ESTERASE UR QL STRIP: ABNORMAL
NITRATE UR QL: NEGATIVE
PH UR STRIP: 6 [PH] (ref 5–7)
RBC #/AREA URNS AUTO: ABNORMAL /HPF
SP GR UR STRIP: 1.02 (ref 1–1.03)
SQUAMOUS #/AREA URNS AUTO: ABNORMAL /LPF
TRICHOMONAS, WET PREP: ABNORMAL
UROBILINOGEN UR STRIP-ACNC: 0.2 E.U./DL
WBC #/AREA URNS AUTO: ABNORMAL /HPF
WBC CLUMPS #/AREA URNS HPF: PRESENT /HPF
WBC'S/HIGH POWER FIELD, WET PREP: ABNORMAL
YEAST, WET PREP: ABNORMAL

## 2022-06-15 PROCEDURE — 99213 OFFICE O/P EST LOW 20 MIN: CPT | Performed by: PHYSICIAN ASSISTANT

## 2022-06-15 PROCEDURE — 81001 URINALYSIS AUTO W/SCOPE: CPT | Performed by: PHYSICIAN ASSISTANT

## 2022-06-15 PROCEDURE — 87186 SC STD MICRODIL/AGAR DIL: CPT | Performed by: PHYSICIAN ASSISTANT

## 2022-06-15 PROCEDURE — 87210 SMEAR WET MOUNT SALINE/INK: CPT

## 2022-06-15 PROCEDURE — 87086 URINE CULTURE/COLONY COUNT: CPT | Performed by: PHYSICIAN ASSISTANT

## 2022-06-15 RX ORDER — CEFDINIR 300 MG/1
300 CAPSULE ORAL 2 TIMES DAILY
Qty: 14 CAPSULE | Refills: 0 | Status: SHIPPED | OUTPATIENT
Start: 2022-06-15 | End: 2022-06-22

## 2022-06-15 RX ORDER — FLUCONAZOLE 150 MG/1
TABLET ORAL
Qty: 2 TABLET | Refills: 0 | Status: SHIPPED | OUTPATIENT
Start: 2022-06-15 | End: 2022-12-13

## 2022-06-15 NOTE — PROGRESS NOTES
Patient presents with:  Urgent Care: UTI burning when urinate ,frequently urination , and  possible yeast infection which started this morning.      Differential Diagnosis includes but is not limited to UTI, yeast infection, BV,    (N39.0) Acute UTI  (primary encounter diagnosis)  Comment:   Plan: cefdinir (OMNICEF) 300 MG capsule            (R30.0) Dysuria  Comment: presumed early yeast infection not yet appreciated on wet prep  Plan: UA reflex to Microscopic and Culture, Wet         preparation, Urine Microscopic, Urine Culture,         fluconazole (DIFLUCAN) 150 MG tablet            If not improving or if condition worsens, follow up with your Primary Care Provider            SUBJECTIVE:   Angelic Correa is a 67 year old female who present today with urinary frequency and dysuria onset this morning.  Also some vaginal itching.  Denies back pain, abdominal pain or fevers.        Past Medical History:   Diagnosis Date     Glaucoma          Current Outpatient Medications   Medication Sig Dispense Refill     Multiple Vitamins-Iron (DAILY-TERESA/IRON/BETA-CAROTENE) TABS TAKE 1 TABLET BY MOUTH DAILY. (Patient not taking: Reported on 10/19/2020) 30 tablet 7     Social History     Tobacco Use     Smoking status: Never Smoker     Smokeless tobacco: Never Used   Substance Use Topics     Alcohol use: Not on file     Family History   Problem Relation Age of Onset     Diabetes Mother      Diabetes Father          ROS:    10 point ROS of systems including Constitutional, Eyes, Respiratory, Cardiovascular, Gastroenterology, Genitourinary, Integumentary, Muscularskeletal, Psychiatric ,neurological were all negative except for pertinent positives noted in my HPI       OBJECTIVE:  BP (!) 162/97 (BP Location: Right arm, Patient Position: Sitting, Cuff Size: Adult Regular)   Pulse 66   Temp (!) 96  F (35.6  C) (Tympanic)   Wt 71.2 kg (157 lb)   SpO2 100%   BMI 24.23 kg/m    Physical Exam:  GENERAL APPEARANCE: healthy, alert and no  distress  ABDOMEN:  soft, nontender, no HSM or masses and bowel sounds normal  SKIN: no suspicious lesions or rashes  BACK: No CVAT    Results for orders placed or performed in visit on 06/15/22   UA reflex to Microscopic and Culture     Status: Abnormal    Specimen: Urine, Midstream   Result Value Ref Range    Color Urine Yellow Colorless, Straw, Light Yellow, Yellow    Appearance Urine Clear Clear    Glucose Urine Negative Negative mg/dL    Bilirubin Urine Negative Negative    Ketones Urine Negative Negative mg/dL    Specific Gravity Urine 1.020 1.003 - 1.035    Blood Urine Large (A) Negative    pH Urine 6.0 5.0 - 7.0    Protein Albumin Urine Negative Negative mg/dL    Urobilinogen Urine 0.2 0.2, 1.0 E.U./dL    Nitrite Urine Negative Negative    Leukocyte Esterase Urine Small (A) Negative   Urine Microscopic     Status: Abnormal   Result Value Ref Range    Bacteria Urine Moderate (A) None Seen /HPF    RBC Urine None Seen 0-2 /HPF /HPF    WBC Urine  (A) 0-5 /HPF /HPF    Squamous Epithelials Urine Few (A) None Seen /LPF    WBC Clumps Urine Present (A) None Seen /HPF   Wet preparation     Status: Abnormal    Specimen: Vagina; Swab   Result Value Ref Range    Trichomonas Absent Absent    Yeast Absent Absent    Clue Cells Absent Absent    WBCs/high power field 3+ (A) None

## 2022-06-15 NOTE — PATIENT INSTRUCTIONS
(N39.0) Acute UTI  (primary encounter diagnosis)  Comment:   Plan: cefdinir (OMNICEF) 300 MG capsule            (R30.0) Dysuria  Comment: presumed early yeast infection not yet appreciated on wet prep  Plan: UA reflex to Microscopic and Culture, Wet         preparation, Urine Microscopic, Urine Culture,         fluconazole (DIFLUCAN) 150 MG tablet

## 2022-06-17 LAB
BACTERIA UR CULT: ABNORMAL
BACTERIA UR CULT: ABNORMAL

## 2022-06-20 ENCOUNTER — OFFICE VISIT (OUTPATIENT)
Dept: CARDIOLOGY | Facility: CLINIC | Age: 67
End: 2022-06-20
Attending: INTERNAL MEDICINE
Payer: COMMERCIAL

## 2022-06-20 VITALS
SYSTOLIC BLOOD PRESSURE: 151 MMHG | HEART RATE: 65 BPM | WEIGHT: 154 LBS | HEIGHT: 68 IN | DIASTOLIC BLOOD PRESSURE: 96 MMHG | OXYGEN SATURATION: 98 % | BODY MASS INDEX: 23.34 KG/M2

## 2022-06-20 DIAGNOSIS — R03.0 ELEVATED BLOOD PRESSURE READING WITHOUT DIAGNOSIS OF HYPERTENSION: ICD-10-CM

## 2022-06-20 DIAGNOSIS — I47.10 SVT (SUPRAVENTRICULAR TACHYCARDIA) (H): Primary | ICD-10-CM

## 2022-06-20 DIAGNOSIS — R00.2 PALPITATIONS: ICD-10-CM

## 2022-06-20 DIAGNOSIS — E78.01 FAMILIAL HYPERCHOLESTEREMIA: ICD-10-CM

## 2022-06-20 PROCEDURE — 99214 OFFICE O/P EST MOD 30 MIN: CPT | Performed by: INTERNAL MEDICINE

## 2022-06-20 RX ORDER — METOPROLOL SUCCINATE 25 MG/1
25 TABLET, EXTENDED RELEASE ORAL DAILY
Qty: 90 TABLET | Refills: 3 | Status: SHIPPED | OUTPATIENT
Start: 2022-06-20 | End: 2023-04-25

## 2022-06-20 NOTE — PROGRESS NOTES
CARDIOLOGY CLINIC FOLLOW-UP NOTE      REASON FOR VISIT:   F/u palpitations    PRIMARY CARE PHYSICIAN:  Grace Galvez        History of Present Illness   Angelic Correa is an extremely pleasant 67 year old female here for follow-up of her recent testing.    She is here with her  today, who is a paramedic.  She has a past medical history significant for frequent SVT, severe dyslipidemia (consistent with heterozygous familial hyperlipidemia), elevated blood pressure without diagnosis of hypertension, and depression.  Her family history is significant for her mother having some type of arrhythmia, though she is not sure which arrhythmia exactly.  She is a former smoker, having quit greater than 20 years ago.  She drinks 2 cups of coffee each morning, and 2-3 glasses of wine per day.    When I last saw her on 4/27/2022, she had described increasing frequency of palpitations over the past few weeks/months.  She had been monitoring her heart rate and ECG with a Q2ebankingdia monitor on her iPhone, and this had mentioned several episodes concerning for possible atrial fibrillation.  Accordingly, I had ordered a 14-day Zio patch, which she completed in 5/2022, and this showed very frequent but generally brief SVT (around 2000 total episodes), with the longest episode under 90 seconds.  She also had a 4% PAC burden, no atrial fibrillation, and no significant dysrhythmias.  In addition, she had an echocardiogram on 6/13/2022 which was read as essentially normal with hyperdynamic LV function, but there was a comment made that the intracavitary hemodynamics were not obtained and a cardiac MRI could be considered if clinically indicated.  I reviewed the images personally, and agree that intracavitary hemodynamics were not assessed, but on my review there did not seem to be significant evidence along the images that were obtained suggestive of intraventricular outflow obstruction.    Symptomatically, she tells me that she actually  feels quite well.  Shortly after sending her Zio patch in, she feels that her palpitations had significantly improved and essentially resolved for the past couple of weeks.  I had suggested starting her on Toprol-XL once I had seen the frequent SVT on her monitor, but she actually had not yet started this and still her palpitations were improved.  No other new symptoms, including no chest pains, excessive shortness of breath, lower extremity swelling, or other complaints.  She has been tolerating the atorvastatin well.    Her most recent lab are from 5/13/2022, and show normal sodium and potassium, normal renal function, normal ALT, normal TSH, normal hemoglobin, total cholesterol 176, HDL 97, LDL 54, and triglycerides 126.  This is much improved from an LDL of 224 on 9/8/2021.      Assessment & Plan     1. Palpitations, apparently due to brief but frequent SVT/PACs, much improved recently without pharmacologic intervention  2. Severe dyslipidemia, consistent with heterozygous familial hyperlipidemia, now well-controlled with atorvastatin 20 mg daily  3. Elevated blood pressure without diagnosis of hypertension  4. Depression  5. Former tobacco abuse (quit greater than 20 years ago)      It was a pleasure to speak with Angelic and her  again in clinic today.  We reviewed the results of her recent testing.  We discussed potential treatment options for her frequent SVT, beginning with lifestyle interventions such as minimizing caffeine and alcohol intake, and also including pharmacologic interventions and potentially ablation (though there does not appear to be a role for this at present).  Thankfully, her symptoms have improved recently, so I think that lifestyle interventions are sufficient for now.  However, if the frequency of the palpitations does tend to change significantly and gets worse again in the future, I think that a low-dose beta-blocker such as Toprol-XL 25 mg daily would be appropriate.  I will  prescribe this for now, and she can take this as needed depending on her symptoms.  In regards to her echo, the intracavitary hemodynamics were not assessed, but outside of the frequent SVT burden, there is not much evidence at present suggestive of hypertrophic cardiomyopathy or any other infiltrative disease.  I do not think cardiac MRI is necessary at this time, and Angelic would prefer to avoid this as well, but we will continue to reassess and reconsider this if the clinical situation changes in the future.    Finally, in regards to her blood pressure, she tells me that she has always had very well-controlled blood pressure, but now it has gotten much worse recently.  It is unclear if this may have some contribution from anxiety.  I recommended a 24-hour blood pressure cuff, and then we can decide if further intervention is needed based on these results.      -24-hour blood pressure cuff  -Minimize alcohol and caffeine intake  -Prescription for Toprol-XL 25 mg daily written; patient to use as needed for palpitations  -Continue atorvastatin 20 mg daily  -Heart healthy diet and regular aerobic exercise      Follow-up: 6 months, or sooner as needed        Bull Guevara MD  Interventional Cardiology  June 20 , 2022        Medications   Current Outpatient Medications   Medication     atorvastatin (LIPITOR) 20 MG tablet     cefdinir (OMNICEF) 300 MG capsule     fluconazole (DIFLUCAN) 150 MG tablet     fluticasone (FLONASE) 50 MCG/ACT spray     sertraline (ZOLOFT) 100 MG tablet     valACYclovir (VALTREX) 500 MG tablet     No current facility-administered medications for this visit.     Allergies   Allergies   Allergen Reactions     Apraclonidine Nausea and Vomiting     Bupropion GI Disturbance and Nausea     Generic only     No Clinical Screening - See Comments      seasonal, cosmetic     Pilocarpine Nausea and Vomiting     Sulfa Drugs Unknown         Physical Exam       BP: (!) 151/96 Pulse: 65     SpO2: 98 %       Vital Signs with Ranges  Pulse:  [65] 65  BP: (151)/(96) 151/96  SpO2:  [98 %] 98 %  154 lbs 0 oz    Constitutional: Well-appearing, no acute distress  Respiratory: Normal respiratory effort, CTAB  Cardiovascular: RRR, no m/r/g.  JVP < 7 cm H2O.  There is no LE edema.  Normal carotid upstrokes, no carotid bruits.

## 2022-06-20 NOTE — PATIENT INSTRUCTIONS
Your blood pressure was elevated at your appointment today.  Elevated blood pressure can increase your risk of a heart attack, stroke and heart failure.  For this reason, we feel it is important to monitor your blood pressure closely.  If you have access to a home blood pressure monitor or are able to check your blood pressure at a local pharmacy in the next week we would like you to do so and call our clinic with those readings. Please call [phone] ([place]im[place]) and leave a message with your name, date of birth, blood pressure reading, date and location it was completed. If your blood pressure remains elevated your care team will be notified.  We appreciate being a part of your healthcare team and look forward to hearing from you soon.

## 2022-06-20 NOTE — LETTER
6/20/2022    Grace Galvez, APRN CNP  [address]  [facility] [address]    RE: Angelic Correa       Dear Colleague,     I had the pleasure of seeing Angelic Correa in the [facility].  CARDIOLOGY CLINIC FOLLOW-UP NOTE      REASON FOR VISIT:   F/u palpitations    PRIMARY CARE PHYSICIAN:  Grace Galvez        History of Present Illness   Angelic Correa is an extremely pleasant 67 year old female here for follow-up of her recent testing.    She is here with her  today, who is a paramedic.  She has a past medical history significant for frequent SVT, severe dyslipidemia (consistent with heterozygous familial hyperlipidemia), elevated blood pressure without diagnosis of hypertension, and depression.  Her family history is significant for her mother having some type of arrhythmia, though she is not sure which arrhythmia exactly.  She is a former smoker, having quit greater than 20 years ago.  She drinks 2 cups of coffee each morning, and 2-3 glasses of wine per day.    When I last saw her on 4/27/2022, she had described increasing frequency of palpitations over the past few weeks/months.  She had been monitoring her heart rate and ECG with a [employer] monitor on her iPhone, and this had mentioned several episodes concerning for possible atrial fibrillation.  Accordingly, I had ordered a 14-day Zio patch, which she completed in 5/2022, and this showed very frequent but generally brief SVT (around 2000 total episodes), with the longest episode under 90 seconds.  She also had a 4% PAC burden, no atrial fibrillation, and no significant dysrhythmias.  In addition, she had an echocardiogram on 6/13/2022 which was read as essentially normal with hyperdynamic LV function, but there was a comment made that the intracavitary hemodynamics were not obtained and a cardiac MRI could be considered if clinically indicated.  I reviewed the images personally, and agree that intracavitary hemodynamics  were not assessed, but on my review there did not seem to be significant evidence along the images that were obtained suggestive of intraventricular outflow obstruction.    Symptomatically, she tells me that she actually feels quite well.  Shortly after sending her Zio patch in, she feels that her palpitations had significantly improved and essentially resolved for the past couple of weeks.  I had suggested starting her on Toprol-XL once I had seen the frequent SVT on her monitor, but she actually had not yet started this and still her palpitations were improved.  No other new symptoms, including no chest pains, excessive shortness of breath, lower extremity swelling, or other complaints.  She has been tolerating the atorvastatin well.    Her most recent lab are from 5/13/2022, and show normal sodium and potassium, normal renal function, normal ALT, normal TSH, normal hemoglobin, total cholesterol 176, HDL 97, LDL 54, and triglycerides 126.  This is much improved from an LDL of 224 on 9/8/2021.      Assessment & Plan     1. Palpitations, apparently due to brief but frequent SVT/PACs, much improved recently without pharmacologic intervention  2. Severe dyslipidemia, consistent with heterozygous familial hyperlipidemia, now well-controlled with atorvastatin 20 mg daily  3. Elevated blood pressure without diagnosis of hypertension  4. Depression  5. Former tobacco abuse (quit greater than 20 years ago)      It was a pleasure to speak with Angelic and her  again in clinic today.  We reviewed the results of her recent testing.  We discussed potential treatment options for her frequent SVT, beginning with lifestyle interventions such as minimizing caffeine and alcohol intake, and also including pharmacologic interventions and potentially ablation (though there does not appear to be a role for this at present).  Thankfully, her symptoms have improved recently, so I think that lifestyle interventions are sufficient for  now.  However, if the frequency of the palpitations does tend to change significantly and gets worse again in the future, I think that a low-dose beta-blocker such as Toprol-XL 25 mg daily would be appropriate.  I will prescribe this for now, and she can take this as needed depending on her symptoms.  In regards to her echo, the intracavitary hemodynamics were not assessed, but outside of the frequent SVT burden, there is not much evidence at present suggestive of hypertrophic cardiomyopathy or any other infiltrative disease.  I do not think cardiac MRI is necessary at this time, and Angelic would prefer to avoid this as well, but we will continue to reassess and reconsider this if the clinical situation changes in the future.    Finally, in regards to her blood pressure, she tells me that she has always had very well-controlled blood pressure, but now it has gotten much worse recently.  It is unclear if this may have some contribution from anxiety.  I recommended a 24-hour blood pressure cuff, and then we can decide if further intervention is needed based on these results.      -24-hour blood pressure cuff  -Minimize alcohol and caffeine intake  -Prescription for Toprol-XL 25 mg daily written; patient to use as needed for palpitations  -Continue atorvastatin 20 mg daily  -Heart healthy diet and regular aerobic exercise      Follow-up: 6 months, or sooner as needed        Bull Guevara MD  Interventional Cardiology  June 20 , 2022        Medications   Current Outpatient Medications   Medication     atorvastatin (LIPITOR) 20 MG tablet     cefdinir (OMNICEF) 300 MG capsule     fluconazole (DIFLUCAN) 150 MG tablet     fluticasone (FLONASE) 50 MCG/ACT spray     sertraline (ZOLOFT) 100 MG tablet     valACYclovir (VALTREX) 500 MG tablet     No current facility-administered medications for this visit.     Allergies   Allergies   Allergen Reactions     Apraclonidine Nausea and Vomiting     Bupropion GI Disturbance and  Nausea     Generic only     No Clinical Screening - See Comments      seasonal, cosmetic     Pilocarpine Nausea and Vomiting     Sulfa Drugs Unknown         Physical Exam       BP: (!) 151/96 Pulse: 65     SpO2: 98 %      Vital Signs with Ranges  Pulse:  [65] 65  BP: (151)/(96) 151/96  SpO2:  [98 %] 98 %  154 lbs 0 oz    Constitutional: Well-appearing, no acute distress  Respiratory: Normal respiratory effort, CTAB  Cardiovascular: RRR, no m/r/g.  JVP < 7 cm H2O.  There is no LE edema.  Normal carotid upstrokes, no carotid bruits.        Thank you for allowing me to participate in the care of your patient.      Sincerely,     Bull Guevara MD     [facility]  cc:   Bull Guevara MD  [id number] [id number]

## 2022-06-21 ENCOUNTER — HOSPITAL ENCOUNTER (OUTPATIENT)
Dept: CARDIOLOGY | Facility: CLINIC | Age: 67
Discharge: HOME | End: 2022-06-21
Attending: INTERNAL MEDICINE | Admitting: INTERNAL MEDICINE
Payer: COMMERCIAL

## 2022-06-21 DIAGNOSIS — R03.0 ELEVATED BLOOD PRESSURE READING WITHOUT DIAGNOSIS OF HYPERTENSION: ICD-10-CM

## 2022-06-21 PROCEDURE — 93790 AMBL BP MNTR W/SW I&R: CPT | Performed by: INTERNAL MEDICINE

## 2022-06-21 PROCEDURE — 93788 AMBL BP MNTR W/SW A/R: CPT

## 2022-06-21 PROCEDURE — 93786 AMBL BP MNTR W/SW REC ONLY: CPT

## 2022-07-25 ENCOUNTER — OFFICE VISIT (OUTPATIENT)
Dept: URGENT CARE | Facility: URGENT CARE | Age: 67
End: 2022-07-25
Payer: COMMERCIAL

## 2022-07-25 VITALS
WEIGHT: 154 LBS | DIASTOLIC BLOOD PRESSURE: 100 MMHG | SYSTOLIC BLOOD PRESSURE: 166 MMHG | BODY MASS INDEX: 23.76 KG/M2 | OXYGEN SATURATION: 98 % | RESPIRATION RATE: 16 BRPM | HEART RATE: 89 BPM | TEMPERATURE: 98.3 F

## 2022-07-25 DIAGNOSIS — R30.0 DYSURIA: Primary | ICD-10-CM

## 2022-07-25 DIAGNOSIS — N30.00 ACUTE CYSTITIS WITHOUT HEMATURIA: ICD-10-CM

## 2022-07-25 LAB
ALBUMIN UR-MCNC: NEGATIVE MG/DL
APPEARANCE UR: CLEAR
BACTERIA #/AREA URNS HPF: ABNORMAL /HPF
BILIRUB UR QL STRIP: NEGATIVE
COLOR UR AUTO: YELLOW
GLUCOSE UR STRIP-MCNC: NEGATIVE MG/DL
HGB UR QL STRIP: NEGATIVE
KETONES UR STRIP-MCNC: NEGATIVE MG/DL
LEUKOCYTE ESTERASE UR QL STRIP: ABNORMAL
NITRATE UR QL: NEGATIVE
PH UR STRIP: 5.5 [PH] (ref 5–7)
RBC #/AREA URNS AUTO: ABNORMAL /HPF
SP GR UR STRIP: >=1.03 (ref 1–1.03)
SQUAMOUS #/AREA URNS AUTO: ABNORMAL /LPF
TRANS CELLS #/AREA URNS HPF: ABNORMAL /HPF
UROBILINOGEN UR STRIP-ACNC: 0.2 E.U./DL
WBC #/AREA URNS AUTO: ABNORMAL /HPF
WBC CLUMPS #/AREA URNS HPF: PRESENT /HPF

## 2022-07-25 PROCEDURE — 99213 OFFICE O/P EST LOW 20 MIN: CPT | Performed by: FAMILY MEDICINE

## 2022-07-25 PROCEDURE — 81001 URINALYSIS AUTO W/SCOPE: CPT | Performed by: FAMILY MEDICINE

## 2022-07-25 PROCEDURE — 87086 URINE CULTURE/COLONY COUNT: CPT | Performed by: FAMILY MEDICINE

## 2022-07-25 RX ORDER — NITROFURANTOIN 25; 75 MG/1; MG/1
100 CAPSULE ORAL 2 TIMES DAILY
Qty: 14 CAPSULE | Refills: 0 | Status: SHIPPED | OUTPATIENT
Start: 2022-07-25 | End: 2022-07-27

## 2022-07-25 NOTE — PROGRESS NOTES
SUBJECTIVE:   Angelic Correa is a 67 year old female who  presents today for a possible UTI. Symptoms of dysuria and frequency have been going on for 2day(s).  Hematuria no.  sudden onsetand mild.  There is no history of fever, chills, nausea or vomiting.  No history of vaginal  discharge. This patient does not  have a history of urinary tract infections. Patient denies rigors, flank pain and temperature > 101 degrees F. or vaginal discharge   She did have a UTI less than a month back and was treated with Omnicef's urine culture grew E. coli and was sensitive to Omnicef.    Angelic was seen today for uti.    Diagnoses and all orders for this visit:    Dysuria  -     UA macro with reflex to Microscopic and Culture - Clinc Collect  -     Cancel: Urine Microscopic  -     Urine Microscopic; Future  -     Urine Microscopic  -     Urine Culture    Acute cystitis without hematuria  -     nitroFURantoin macrocrystal-monohydrate (MACROBID) 100 MG capsule; Take 1 capsule (100 mg) by mouth 2 times daily for 7 days          PLAN:  As per ordered above  Drink plenty of fluids.  Prevention and treatment of UTI's discussed.Signs and symptoms of pyelonephritis mentioned.  Follow up with primary care physician if not improving  As recently with Omnicef for a UTI less than a month back would treat with a different antibiotic  Patient will treated with nitrofurantoin for 7 days urine culture is pending    Past Medical History:   Diagnosis Date     Glaucoma      Current Outpatient Medications   Medication Sig Dispense Refill     atorvastatin (LIPITOR) 20 MG tablet Take 1 tablet (20 mg) by mouth daily 90 tablet 3     fluticasone (FLONASE) 50 MCG/ACT spray 1 spray       metoprolol succinate ER (TOPROL XL) 25 MG 24 hr tablet Take 1 tablet (25 mg) by mouth daily 90 tablet 3     sertraline (ZOLOFT) 100 MG tablet Take 1.5 tablets (150 mg) by mouth daily 135 tablet 3     valACYclovir (VALTREX) 500 MG tablet Take 1 tablet (500 mg) by mouth 2 times  daily 6 tablet 6     fluconazole (DIFLUCAN) 150 MG tablet Take 1 po today and 1 po after finishing antibiotic. (Patient not taking: Reported on 2022) 2 tablet 0     Social History     Tobacco Use     Smoking status: Former Smoker     Types: Cigarettes     Quit date: 1996     Years since quittin.5     Smokeless tobacco: Never Used   Substance Use Topics     Alcohol use: Yes     Comment: 2-3 drinks daily       ROS:   Review of systems negative except as stated above.    OBJECTIVE:  BP (!) 166/100   Pulse 89   Temp 98.3  F (36.8  C) (Tympanic)   Resp 16   Wt 69.9 kg (154 lb)   SpO2 98%   BMI 23.76 kg/m    GENERAL APPEARANCE: healthy, alert and no distress  RESP: lungs clear to auscultation - no rales, rhonchi or wheezes  CV: regular rates and rhythm, normal S1 S2, no murmur noted  ABDOMEN:  soft, nontender, no HSM or masses and bowel sounds normal  BACK: No CVA tenderness  PSYCH: mentation appears normal    Jackelin Trejo MD

## 2022-07-27 ENCOUNTER — TELEPHONE (OUTPATIENT)
Dept: INTERNAL MEDICINE | Facility: CLINIC | Age: 67
End: 2022-07-27

## 2022-07-27 DIAGNOSIS — N30.00 ACUTE CYSTITIS WITHOUT HEMATURIA: ICD-10-CM

## 2022-07-27 DIAGNOSIS — N30.00 ACUTE CYSTITIS WITHOUT HEMATURIA: Primary | ICD-10-CM

## 2022-07-27 LAB — BACTERIA UR CULT: NORMAL

## 2022-07-27 RX ORDER — CEPHALEXIN 500 MG/1
500 CAPSULE ORAL 2 TIMES DAILY
Qty: 12 CAPSULE | Refills: 0 | Status: SHIPPED | OUTPATIENT
Start: 2022-07-27 | End: 2022-12-13

## 2022-07-27 RX ORDER — CEPHALEXIN 500 MG/1
500 CAPSULE ORAL 2 TIMES DAILY
Qty: 12 CAPSULE | Refills: 0 | Status: SHIPPED | OUTPATIENT
Start: 2022-07-27 | End: 2022-07-27

## 2022-07-27 NOTE — TELEPHONE ENCOUNTER
Pt called back regarding the Keflex rx that was sent to [facility]. [facility] informed her they are unable to fill with insurance as it states it was previously filled at [facility] pharmacy. [facility] confirms they received the discontinued order and did not fill it, [facility] should be able to fill. Reached out to [facility] who states for some reason insurance still states it was filled at [facility]. [facility] pharmacy would need to contact insurance or pt could pay today, it would be about $7.65. Reached out to pt who states she doesn't want pharmacy to reach out to insurance, she will  at that price at [facility]. She had no further questions at this time.     Katelyn HUSTON RN  [facility]

## 2022-07-27 NOTE — TELEPHONE ENCOUNTER
Received a call from the patient stating she was seen in urgent care on Monday and was prescribed an antibiotic for UTI. Patient states the antibiotic (Macrobid) has made her extremely nauseous and patient states the symptoms have been getting worse since Monday. Patient states she has 4 more days of the antibiotic but is wondering if there is a different antibiotic she can be prescribed? Patient states she does have Ondansetron at home and she is wondering if she can also take this medication to help her symptoms?    If new prescription is order, patient would like medication sent to [facility] on [address]    Routing to PCP for review.     Can we leave a detailed message on this number? YES  Phone number patient can be reached at: Home number on file [phone] (home)    Franny Olvera RN  [facility] Triage

## 2022-07-27 NOTE — TELEPHONE ENCOUNTER
Called and relayed message from the provider. Patient would like medication sent to [facility] versus [facility]. Medication resent. Patient expressed understanding and had no additional questions at this time.     Franny Olvera RN

## 2022-07-27 NOTE — TELEPHONE ENCOUNTER
Yes- lets stop the nitrofurantoin  Start Keflex 1 pill twice daily for 6 days- Rx sent to pharmacy  Notify provider if ongoing N/V, no improvement in symptoms    Thank you  PEYMAN Taylor CNP

## 2022-08-23 ENCOUNTER — HOSPITAL ENCOUNTER (OUTPATIENT)
Dept: ULTRASOUND IMAGING | Facility: CLINIC | Age: 67
Discharge: HOME | End: 2022-08-23
Attending: PREVENTIVE MEDICINE
Payer: COMMERCIAL

## 2022-08-23 ENCOUNTER — NURSE TRIAGE (OUTPATIENT)
Dept: NURSING | Facility: CLINIC | Age: 67
End: 2022-08-23

## 2022-08-23 ENCOUNTER — HOSPITAL ENCOUNTER (OUTPATIENT)
Dept: CT IMAGING | Facility: CLINIC | Age: 67
Discharge: HOME | End: 2022-08-23
Attending: PREVENTIVE MEDICINE
Payer: COMMERCIAL

## 2022-08-23 ENCOUNTER — OFFICE VISIT (OUTPATIENT)
Dept: URGENT CARE | Facility: URGENT CARE | Age: 67
End: 2022-08-23
Payer: COMMERCIAL

## 2022-08-23 ENCOUNTER — OFFICE VISIT (OUTPATIENT)
Dept: PEDIATRICS | Facility: CLINIC | Age: 67
End: 2022-08-23
Attending: PHYSICIAN ASSISTANT

## 2022-08-23 ENCOUNTER — TELEPHONE (OUTPATIENT)
Dept: NURSING | Facility: CLINIC | Age: 67
End: 2022-08-23

## 2022-08-23 VITALS
BODY MASS INDEX: 23.76 KG/M2 | WEIGHT: 154 LBS | TEMPERATURE: 98.3 F | OXYGEN SATURATION: 99 % | DIASTOLIC BLOOD PRESSURE: 97 MMHG | HEART RATE: 64 BPM | RESPIRATION RATE: 18 BRPM | SYSTOLIC BLOOD PRESSURE: 152 MMHG

## 2022-08-23 VITALS
WEIGHT: 154 LBS | BODY MASS INDEX: 23.76 KG/M2 | DIASTOLIC BLOOD PRESSURE: 104 MMHG | OXYGEN SATURATION: 100 % | HEART RATE: 68 BPM | SYSTOLIC BLOOD PRESSURE: 164 MMHG

## 2022-08-23 DIAGNOSIS — R10.13 ABDOMINAL PAIN, EPIGASTRIC: ICD-10-CM

## 2022-08-23 DIAGNOSIS — R14.0 ABDOMINAL BLOATING: ICD-10-CM

## 2022-08-23 DIAGNOSIS — R10.13 ABDOMINAL PAIN, EPIGASTRIC: Primary | ICD-10-CM

## 2022-08-23 LAB
ALBUMIN SERPL BCG-MCNC: 4.1 G/DL (ref 3.5–5.2)
ALBUMIN UR-MCNC: NEGATIVE MG/DL
ALP SERPL-CCNC: 121 U/L (ref 35–104)
ALT SERPL W P-5'-P-CCNC: 23 U/L (ref 10–35)
ANION GAP SERPL CALCULATED.3IONS-SCNC: 12 MMOL/L (ref 7–15)
APPEARANCE UR: CLEAR
AST SERPL W P-5'-P-CCNC: 40 U/L (ref 10–35)
BASOPHILS # BLD AUTO: 0 10E3/UL (ref 0–0.2)
BASOPHILS NFR BLD AUTO: 1 %
BILIRUB SERPL-MCNC: 0.7 MG/DL
BILIRUB UR QL STRIP: NEGATIVE
BUN SERPL-MCNC: 11 MG/DL (ref 8–23)
C DIFF TOX B STL QL: NEGATIVE
CALCIUM SERPL-MCNC: 9.4 MG/DL (ref 8.8–10.2)
CHLORIDE SERPL-SCNC: 102 MMOL/L (ref 98–107)
COLOR UR AUTO: ABNORMAL
CREAT SERPL-MCNC: 0.56 MG/DL (ref 0.51–0.95)
CRP SERPL-MCNC: <3 MG/L
DEPRECATED HCO3 PLAS-SCNC: 25 MMOL/L (ref 22–29)
EOSINOPHIL # BLD AUTO: 0.4 10E3/UL (ref 0–0.7)
EOSINOPHIL NFR BLD AUTO: 5 %
ERYTHROCYTE [DISTWIDTH] IN BLOOD BY AUTOMATED COUNT: 12 % (ref 10–15)
GFR SERPL CREATININE-BSD FRML MDRD: >90 ML/MIN/1.73M2
GLUCOSE SERPL-MCNC: 106 MG/DL (ref 70–99)
GLUCOSE UR STRIP-MCNC: NEGATIVE MG/DL
HCT VFR BLD AUTO: 41.1 % (ref 35–47)
HGB BLD-MCNC: 13.2 G/DL (ref 11.7–15.7)
HGB UR QL STRIP: NEGATIVE
IMM GRANULOCYTES # BLD: 0 10E3/UL
IMM GRANULOCYTES NFR BLD: 0 %
KETONES UR STRIP-MCNC: NEGATIVE MG/DL
LEUKOCYTE ESTERASE UR QL STRIP: ABNORMAL
LIPASE SERPL-CCNC: 41 U/L (ref 13–60)
LYMPHOCYTES # BLD AUTO: 2.2 10E3/UL (ref 0.8–5.3)
LYMPHOCYTES NFR BLD AUTO: 30 %
MCH RBC QN AUTO: 31.4 PG (ref 26.5–33)
MCHC RBC AUTO-ENTMCNC: 32.1 G/DL (ref 31.5–36.5)
MCV RBC AUTO: 98 FL (ref 78–100)
MONOCYTES # BLD AUTO: 0.6 10E3/UL (ref 0–1.3)
MONOCYTES NFR BLD AUTO: 8 %
MUCOUS THREADS #/AREA URNS LPF: PRESENT /LPF
NEUTROPHILS # BLD AUTO: 4.1 10E3/UL (ref 1.6–8.3)
NEUTROPHILS NFR BLD AUTO: 56 %
NITRATE UR QL: NEGATIVE
NRBC # BLD AUTO: 0 10E3/UL
NRBC BLD AUTO-RTO: 0 /100
PH UR STRIP: 5.5 [PH] (ref 5–7)
PLATELET # BLD AUTO: 267 10E3/UL (ref 150–450)
POTASSIUM SERPL-SCNC: 4.8 MMOL/L (ref 3.4–5.3)
PROT SERPL-MCNC: 7.6 G/DL (ref 6.4–8.3)
RBC # BLD AUTO: 4.21 10E6/UL (ref 3.8–5.2)
RBC URINE: 0 /HPF
SODIUM SERPL-SCNC: 139 MMOL/L (ref 136–145)
SP GR UR STRIP: 1 (ref 1–1.03)
SQUAMOUS EPITHELIAL: 1 /HPF
TROPONIN T SERPL HS-MCNC: 7 NG/L
TSH SERPL DL<=0.005 MIU/L-ACNC: 1.04 UIU/ML (ref 0.3–4.2)
UROBILINOGEN UR STRIP-MCNC: NORMAL MG/DL
WBC # BLD AUTO: 7.3 10E3/UL (ref 4–11)
WBC URINE: 3 /HPF

## 2022-08-23 PROCEDURE — 250N000011 HC RX IP 250 OP 636: Performed by: PREVENTIVE MEDICINE

## 2022-08-23 PROCEDURE — 99215 OFFICE O/P EST HI 40 MIN: CPT | Performed by: PREVENTIVE MEDICINE

## 2022-08-23 PROCEDURE — 76705 ECHO EXAM OF ABDOMEN: CPT

## 2022-08-23 PROCEDURE — 87493 C DIFF AMPLIFIED PROBE: CPT | Mod: 59 | Performed by: PREVENTIVE MEDICINE

## 2022-08-23 PROCEDURE — 83690 ASSAY OF LIPASE: CPT | Performed by: PREVENTIVE MEDICINE

## 2022-08-23 PROCEDURE — 86364 TISS TRNSGLTMNASE EA IG CLAS: CPT | Performed by: PREVENTIVE MEDICINE

## 2022-08-23 PROCEDURE — 87177 OVA AND PARASITES SMEARS: CPT | Performed by: PREVENTIVE MEDICINE

## 2022-08-23 PROCEDURE — 99207 REFERRAL TO ACUTE AND DIAGNOSTIC SERVICES: CPT | Performed by: PHYSICIAN ASSISTANT

## 2022-08-23 PROCEDURE — 87328 CRYPTOSPORIDIUM AG IA: CPT | Performed by: PREVENTIVE MEDICINE

## 2022-08-23 PROCEDURE — 80053 COMPREHEN METABOLIC PANEL: CPT | Performed by: PREVENTIVE MEDICINE

## 2022-08-23 PROCEDURE — 74177 CT ABD & PELVIS W/CONTRAST: CPT

## 2022-08-23 PROCEDURE — 99417 PROLNG OP E/M EACH 15 MIN: CPT | Performed by: PREVENTIVE MEDICINE

## 2022-08-23 PROCEDURE — 84484 ASSAY OF TROPONIN QUANT: CPT | Performed by: PREVENTIVE MEDICINE

## 2022-08-23 PROCEDURE — 81001 URINALYSIS AUTO W/SCOPE: CPT | Performed by: PREVENTIVE MEDICINE

## 2022-08-23 PROCEDURE — 84443 ASSAY THYROID STIM HORMONE: CPT | Performed by: PREVENTIVE MEDICINE

## 2022-08-23 PROCEDURE — 87329 GIARDIA AG IA: CPT | Mod: 59 | Performed by: PREVENTIVE MEDICINE

## 2022-08-23 PROCEDURE — 85025 COMPLETE CBC W/AUTO DIFF WBC: CPT | Performed by: PREVENTIVE MEDICINE

## 2022-08-23 PROCEDURE — 87506 IADNA-DNA/RNA PROBE TQ 6-11: CPT | Performed by: PREVENTIVE MEDICINE

## 2022-08-23 PROCEDURE — 86140 C-REACTIVE PROTEIN: CPT | Performed by: PREVENTIVE MEDICINE

## 2022-08-23 PROCEDURE — 93000 ELECTROCARDIOGRAM COMPLETE: CPT | Performed by: PREVENTIVE MEDICINE

## 2022-08-23 PROCEDURE — 250N000009 HC RX 250: Performed by: PREVENTIVE MEDICINE

## 2022-08-23 PROCEDURE — 36415 COLL VENOUS BLD VENIPUNCTURE: CPT | Performed by: PREVENTIVE MEDICINE

## 2022-08-23 PROCEDURE — 87209 SMEAR COMPLEX STAIN: CPT | Performed by: PREVENTIVE MEDICINE

## 2022-08-23 RX ORDER — OMEPRAZOLE 20 MG/1
20 TABLET, DELAYED RELEASE ORAL DAILY
Qty: 30 TABLET | Refills: 0 | Status: SHIPPED | OUTPATIENT
Start: 2022-08-23 | End: 2022-09-22

## 2022-08-23 RX ORDER — IOPAMIDOL 755 MG/ML
500 INJECTION, SOLUTION INTRAVASCULAR ONCE
Status: COMPLETED | OUTPATIENT
Start: 2022-08-23 | End: 2022-08-23

## 2022-08-23 RX ADMIN — SODIUM CHLORIDE 59 ML: 9 INJECTION, SOLUTION INTRAVENOUS at 12:20

## 2022-08-23 RX ADMIN — IOPAMIDOL 78 ML: 755 INJECTION, SOLUTION INTRAVENOUS at 12:20

## 2022-08-23 NOTE — TELEPHONE ENCOUNTER
Having off and on abdominal pain for the past two weeks    Has pain after eating    And heart burn    She also feels bloated    It was gradual onset    Also having diarrhea - that varies    No blood in the stool    No black    Did a supp    She had a stool that look green stool that was very strange.    Advised that she be seen today in [facility].    Shey Griffith RN  [place] Nurse Advisor  8:45 AM  8/23/2022        Reason for Disposition    Constant abdominal pain lasting > 2 hours    Additional Information    Negative: Passed out (i.e., fainted, collapsed and was not responding)    Negative: Shock suspected (e.g., cold/pale/clammy skin, too weak to stand, low BP, rapid pulse)    Negative: Visible sweat on face or sweat is dripping down    Negative: SEVERE abdominal pain (e.g., excruciating)    Negative: Pain lasting > 10 minutes and over 50 years old    Negative: Pain lasting > 10 minutes and over 40 years old and associated chest, arm, neck, upper back, or jaw pain    Negative: Pain lasting > 10 minutes and over 35 years old and at least one cardiac risk factor (i.e., hypertension, diabetes, obesity, smoker or strong family history of heart disease)    Negative: Pain lasting > 10 minutes and history of heart disease (i.e., heart attack, bypass surgery, angina, angioplasty, CHF)    Negative: Recent injury to the abdomen    Negative: Vomiting red blood or black (coffee ground) material    Negative: Bloody, black, or tarry bowel movements  (Exception: Chronic-unchanged black-grey bowel movements and is taking iron pills or Pepto-Bismol.)    Negative: Pregnant 20 weeks or more and new hand or face swelling    Protocols used: ABDOMINAL PAIN - UPPER-A-OH

## 2022-08-23 NOTE — PROGRESS NOTES
Assessment & Plan     Abdominal pain, epigastric    Due to ongoing abdominal pain, fam hx of gallbladder disease  Patient sent to the [facility] center today for ultrasound and blood work  - Referral to Acute and Diagnostic Services (Day of diagnostic / First order acute); Future    Abdominal bloating    Consider CT or abdominal ultrasound  Lipase, amylase, CMP   This could be due to chronic gastritis but due to length of symptoms and patient stating they are worsening, it warrants imaging  - Referral to Acute and Diagnostic Services (Day of diagnostic / First order acute); Future       CONSULTATION/REFERRAL to [facility]    No follow-ups on file.    Srinath Lara, [facility], PA-C  [facility]    Vladimir Atwood is a 67 year old, presenting for the following health issues:  Abdominal Pain (2 weeks, after eating, abdominal bloating and discomfort 6 months)      HPI     Angelic Correa, 67 year old, female presents to the urgent care today with:   Abdominal Pain (2 weeks, after eating, abdominal bloating and discomfort 6 months)      Review of Systems   Constitutional, HEENT, cardiovascular, pulmonary, GI, , musculoskeletal, neuro, skin, endocrine and psych systems are negative, except as otherwise noted.      Objective    BP (!) 164/104 (BP Location: Right arm, Patient Position: Sitting, Cuff Size: Adult Regular)   Pulse 68   Wt 69.9 kg (154 lb)   SpO2 100%   BMI 23.76 kg/m    Body mass index is 23.76 kg/m .  Physical Exam   GENERAL: healthy, alert and no distress  RESP: lungs clear to auscultation - no rales, rhonchi or wheezes  CV: regular rate and rhythm, normal S1 S2, no S3 or S4, no murmur, click or rub, no peripheral edema and peripheral pulses strong  ABDOMEN: tenderness epigastric and RUQ and bowel sounds normal  MS: no gross musculoskeletal defects noted, no edema  SKIN: no suspicious lesions or rashes  NEURO: Normal strength and tone, mentation intact and speech normal  PSYCH: mentation  appears normal, affect normal/bright                    .  ..

## 2022-08-23 NOTE — PATIENT INSTRUCTIONS
Colonoscopy 5/2023  Limit alcohol intake  Omeprazole 20 mg daily  Stool tests pending  Tylenol (acetaminophen) 500 mg three times per day as needed for pain control.    Follow up with pcp in 10-14 days for recheck  Consider upper endoscopy at that time if not improving.

## 2022-08-23 NOTE — PROGRESS NOTES
Assessment & Plan     (R10.13) Abdominal pain, epigastric  (primary encounter diagnosis)  Plan: Ova and Parasite Exam Routine,         Cryptosporidium/Giardia Immunoassay,         Clostridium difficile Toxin B PCR, CBC with         platelets differential, Comprehensive metabolic        panel, CRP inflammation, Enteric Bacteria and         Virus Panel by BENNIE Stool, Lipase, Troponin T,         High Sensitivity, UA with Microscopic reflex to        Culture, CT Abdomen Pelvis w Contrast, US         Abdomen Limited, EKG 12-lead complete w/read -         Clinics, sodium chloride (PF) 0.9% PF flush 3         mL, IV access, UA with Microscopic reflex to         Culture - Clinic Collect, Helicobacter pylori         Antigen Stool, Tissue transglutaminase filippo IgA         and IgG, omeprazole (PRILOSEC OTC) 20 MG EC         Tablet    ?Gastritis, gastric ulcer, less likely gallbladder given nl ct and us.  Recommend upper endoscopy in follow up if not improving.  Consider hida.  Colonoscopy 5/2023 - due  Limit alcohol intake  Omeprazole 20 mg daily - start  Stool tests pending - including h pylori  Tylenol (acetaminophen) 500 mg three times per day as needed for pain control.    Follow up with pcp in 10-14 days for recheck and further evaluation of symptoms and elevated liver enzymes  Consider upper endoscopy at that time if not improving.    112 minutes spent on the date of the encounter doing chart review, review of test results, interpretation of tests, patient visit, documentation, discussion with other provider(s) and discussion with family        See Patient Instructions    Return in about 10 days (around 9/2/2022), or if symptoms worsen or fail to improve.    Keenan Galvez MD  [facility] JACKELYN Atwood is a 67 year old female presenting for the following health issues:  Abdominal Pain (Epigastric pain X 1 week, Diarrhea X 1 week, notes previous 102 F fever)      HPI  "    Abdominal/Flank Pain  Onset/Duration: X 1 week  Description:   Character: Dull ache and Fullness  Location: epigastric region  Radiation: None  Intensity: 4/10  Progression of Symptoms:  intermittent and waxing and waning  Accompanying Signs & Symptoms:  Fever/Chills: YES- 102 F X 2 days, 1 week ago  Gas/Bloating: YES  Nausea: no   Vomiting: no   Diarrhea: YES- X 2 days, 1 week ago with fever  Constipation: YES- notes hard stools yesterday, this is intermittent  Dysuria or Hematuria: YES- this is was previously treated anti biotic   History:   Trauma: no   Previous similar pain: YES- similar to heartburn and reflux, but not this intense  Previous tests done: no   Previous Abdominal Surgery: YES- fibroids removed X 30 years ago.    Precipitating factors:   Does the pain change with:     Food: YES- increased bloating, \"fullness\" long lasting    Bowel Movement: YES- relief    Urination: no    Other factors:  YES- stool has been lighter in color.  Therapies tried and outcome: Herbal tea last week, OTC antacids, these give temporary relief    When did you eat last: coffee, sugar, half and half, 2 cups today @ 07:30.    This is a 66 yo female who has had on and off mid abdominal and epigastric pain over the past 5-6 months.  No weight change.  No night time symptoms.  Hurts more after she eats.  Loose stools are chronic for her.  3-4 per day - soft, formed.  No blood in stool or black stool. No pain with urination or blood in urine today although she was recently treated for a uti.  No vaginal discharge or bleeding.  Has had fibroid removal years ago but no other abdominal or pelvic surgeries.  Had fever in the past week 5-6 days ago unassociated with other symptoms.  No cp, sob, cough, neck or arm pain.  No back pain or flank pain.  Some nausea but no vomiting.  Occasional nsaid.  No smoking or vaping.  No recreational drug use.  3 alcoholic beverages daily.      Review of Systems   Constitutional, HEENT, " cardiovascular, pulmonary, GI, , musculoskeletal, neuro, skin, endocrine and psych systems are negative, except as otherwise noted.      Objective    BP (!) 184/103 (BP Location: Right arm, Patient Position: Chair, Cuff Size: Adult Regular)   Pulse 64   Temp 98.3  F (36.8  C) (Oral)   Resp 18   Wt 69.9 kg (154 lb)   SpO2 99%   BMI 23.76 kg/m    Body mass index is 23.76 kg/m .  Physical Exam   GENERAL: healthy, alert and no distress  EYES: Eyes grossly normal to inspection, PERRL and conjunctivae and sclerae normal  HENT: ear canals and TM's normal, nose and mouth without ulcers or lesions  NECK: no adenopathy, no asymmetry, masses, or scars and thyroid normal to palpation  RESP: lungs clear to auscultation - no rales, rhonchi or wheezes  CV: regular rate and rhythm, normal S1 S2, no S3 or S4, no murmur, click or rub, no peripheral edema and peripheral pulses strong  ABDOMEN: soft, mild ttp diffusely, no guarding, no rebound, no ttp mcburneys point, neg murphys and carnetts, no hepatosplenomegaly, no masses and bowel sounds normal  MS: no gross musculoskeletal defects noted, no edema  SKIN: no suspicious lesions or rashes  NEURO: Normal strength and tone, mentation intact and speech normal  PSYCH: mentation appears normal, affect normal/bright    Results for orders placed or performed during the hospital encounter of 08/23/22   CT Abdomen Pelvis w Contrast     Status: None    Narrative    CT ABDOMEN AND PELVIS WITH CONTRAST 8/23/2022 12:29 PM    CLINICAL HISTORY: Mid abdominal pain. Bloating.    TECHNIQUE: CT scan of the abdomen and pelvis was performed following  injection of IV contrast. Multiplanar reformats were obtained. Dose  reduction techniques were used.  CONTRAST: 59 mL [id number]    COMPARISON: Right upper quadrant ultrasound performed earlier today.    FINDINGS:   LOWER CHEST: The visualized lung bases are clear.    HEPATOBILIARY: Two tiny left hepatic cysts would require no routine  follow-up. No  suspicious hepatic masses. No calcified gallstones.    PANCREAS: Normal.    SPLEEN: Normal.    ADRENAL GLANDS: Normal.    KIDNEYS/BLADDER: Unremarkable. No hydronephrosis.    BOWEL: No bowel obstruction. No convincing evidence for colitis or  diverticulitis. Unremarkable appendix.    PELVIC ORGANS: Unremarkable.    LYMPH NODES: No enlarged lymph nodes are identified in the abdomen or  pelvis.    VASCULATURE: Unremarkable.    ADDITIONAL FINDINGS: None.    MUSCULOSKELETAL: Unremarkable.      Impression    IMPRESSION:  No acute abnormality in the abdomen or pelvis. No cause  for abdominal pain is identified.    ISSAC DEE MD         SYSTEM ID:  [id number]   Results for orders placed or performed during the hospital encounter of 08/23/22   US Abdomen Limited     Status: None    Narrative    ULTRASOUND ABDOMEN LIMITED August 23, 2022 12:23 PM    CLINICAL HISTORY: Right upper quadrant pain. Abdominal bloating.    TECHNIQUE: Limited abdominal ultrasound.    COMPARISON: None.    FINDINGS: The exam is somewhat limited related to prominent overlying  bowel gas.    GALLBLADDER: The gallbladder is unremarkable. No gallstones, wall  thickening, or pericholecystic fluid. Negative sonographic Cuellar's  sign.    BILE DUCTS: There is no biliary dilatation. The common duct measures 6  mm. Portions of the common duct could not be visualized due to  overlying bowel gas.    LIVER: Unremarkable. No evidence for fatty infiltration of the liver.  No focal hepatic masses.    RIGHT KIDNEY: Unremarkable. No hydronephrosis.    PANCREAS: The visualized portions of the pancreas are normal.    No ascites.      Impression    IMPRESSION: Unremarkable right upper quadrant ultrasound. No cause for  abdominal pain is identified.    ISSAC DEE MD         SYSTEM ID:  [id number]   Results for orders placed or performed in visit on 08/23/22   Comprehensive metabolic panel     Status: Abnormal   Result Value Ref Range    Sodium 139 136 - 145 mmol/L     Potassium 4.8 3.4 - 5.3 mmol/L    Creatinine 0.56 0.51 - 0.95 mg/dL    Urea Nitrogen 11.0 8.0 - 23.0 mg/dL    Chloride 102 98 - 107 mmol/L    Carbon Dioxide (CO2) 25 22 - 29 mmol/L    Anion Gap 12 7 - 15 mmol/L    Glucose 106 (H) 70 - 99 mg/dL    Calcium 9.4 8.8 - 10.2 mg/dL    Protein Total 7.6 6.4 - 8.3 g/dL    Albumin 4.1 3.5 - 5.2 g/dL    Bilirubin Total 0.7 <=1.2 mg/dL    Alkaline Phosphatase 121 (H) 35 - 104 U/L    AST 40 (H) 10 - 35 U/L    ALT 23 10 - 35 U/L    GFR Estimate >90 >60 mL/min/1.73m2   CRP inflammation     Status: Normal   Result Value Ref Range    CRP Inflammation <3.00 <5.00 mg/L   Lipase     Status: Normal   Result Value Ref Range    Lipase 41 13 - 60 U/L   Troponin T, High Sensitivity     Status: Normal   Result Value Ref Range    Troponin T, High Sensitivity 7 <=14 ng/L   CBC with platelets and differential     Status: None   Result Value Ref Range    WBC Count 7.3 4.0 - 11.0 10e3/uL    RBC Count 4.21 3.80 - 5.20 10e6/uL    Hemoglobin 13.2 11.7 - 15.7 g/dL    Hematocrit 41.1 35.0 - 47.0 %    MCV 98 78 - 100 fL    MCH 31.4 26.5 - 33.0 pg    MCHC 32.1 31.5 - 36.5 g/dL    RDW 12.0 10.0 - 15.0 %    Platelet Count 267 150 - 450 10e3/uL    % Neutrophils 56 %    % Lymphocytes 30 %    % Monocytes 8 %    % Eosinophils 5 %    % Basophils 1 %    % Immature Granulocytes 0 %    NRBCs per 100 WBC 0 <1 /100    Absolute Neutrophils 4.1 1.6 - 8.3 10e3/uL    Absolute Lymphocytes 2.2 0.8 - 5.3 10e3/uL    Absolute Monocytes 0.6 0.0 - 1.3 10e3/uL    Absolute Eosinophils 0.4 0.0 - 0.7 10e3/uL    Absolute Basophils 0.0 0.0 - 0.2 10e3/uL    Absolute Immature Granulocytes 0.0 <=0.4 10e3/uL    Absolute NRBCs 0.0 10e3/uL   UA with Microscopic reflex to Culture - Clinic Collect     Status: Abnormal    Specimen: Urine, Clean Catch   Result Value Ref Range    Color Urine Straw Colorless, Straw, Light Yellow, Yellow    Appearance Urine Clear Clear    Glucose Urine Negative Negative mg/dL    Bilirubin Urine Negative  Negative    Ketones Urine Negative Negative mg/dL    Specific Gravity Urine 1.005 1.003 - 1.035    Blood Urine Negative Negative    pH Urine 5.5 5.0 - 7.0    Protein Albumin Urine Negative Negative mg/dL    Urobilinogen Urine Normal Normal, 2.0 mg/dL    Nitrite Urine Negative Negative    Leukocyte Esterase Urine Small (A) Negative    Mucus Urine Present (A) None Seen /LPF    RBC Urine 0 <=2 /HPF    WBC Urine 3 <=5 /HPF    Squamous Epithelials Urine 1 <=1 /HPF    Narrative    Urine Culture not indicated   TSH with free T4 reflex     Status: Normal   Result Value Ref Range    TSH 1.04 0.30 - 4.20 uIU/mL   Tissue transglutaminase filippo IgA and IgG     Status: Normal   Result Value Ref Range    Tissue Transglutaminase Antibody IgA 0.6 <7.0 U/mL    Tissue Transglutaminase Antibody IgG <0.6 <7.0 U/mL   Ova and Parasite Exam Routine     Status: Normal    Specimen: Per Rectum; Stool   Result Value Ref Range    OVA AND PARASITE EXAM Negative Negative    Narrative    Cryptosporidium, Cyclospora and Microsporidia are not readily detected by this method.   Cryptosporidium/Giardia Immunoassay     Status: Normal    Specimen: Per Rectum; Stool   Result Value Ref Range    Cryptosporidium parvum antigen Negative Negative    Giardia lamblia antigen Negative Negative   Clostridium difficile Toxin B PCR     Status: Normal    Specimen: Per Rectum; Stool   Result Value Ref Range    C Difficile Toxin B by PCR Negative Negative    Narrative    The [employer] Xpert C. difficile Assay, performed on the [employer]  Instrument Systems, is a qualitative in vitro diagnostic test for rapid detection of toxin B gene sequences from unformed (liquid or soft) stool specimens collected from patients suspected of having Clostridioides difficile infection (CDI). The test utilizes automated real-time polymerase chain reaction (PCR) to detect toxin gene sequences associated with toxin producing C. difficile. The Xpert C. difficile Assay is intended as  an aid in the diagnosis of CDI.   Enteric Bacteria and Virus Panel by BENNIE Stool     Status: Normal    Specimen: Per Rectum; Stool   Result Value Ref Range    Campylobacter group Not Detected Not Detected    Salmonella species Not Detected Not Detected    Shigella species Not Detected Not Detected    Vibrio group Not Detected Not Detected    Rotavirus Not Detected Not Detected    Shiga toxin 1 gene Not Detected Not Detected    Shiga toxin 2 gene Not Detected Not Detected    Norovirus I and II Not Detected Not Detected    Yersinia enterocolitica Not Detected Not Detected    Narrative    Testing performed by multiplexed, qualitative PCR using the [employer] Enteric Pathogens Nucleic Acid Test. Results should not be used as the sole basis for diagnosis, treatment or other patient management decisions. Positive results do not rule out co-infection with other organisms that are not detected by this test and may not be the sole or definitive cause of patient illness. Negative results in the setting of clinical illness compatible with gastroenteritis may be due to infection by pathogens that are not detected by this test or non-infectious causes such as ulcerative colitis, irritable bowel syndrome or Crohn's disease. Note: Shiga toxin producing E. coli (STEC) typically harbor one or both genes that encode for Shiga toxins 1 and 2.   CBC with platelets differential     Status: None    Narrative    The following orders were created for panel order CBC with platelets differential.  Procedure                               Abnormality         Status                     ---------                               -----------         ------                     CBC with platelets and d...[[id number]]                      Final result                 Please view results for these tests on the individual orders.

## 2022-08-24 ENCOUNTER — LAB (OUTPATIENT)
Dept: LAB | Facility: CLINIC | Age: 67
End: 2022-08-24
Payer: COMMERCIAL

## 2022-08-24 DIAGNOSIS — R10.13 ABDOMINAL PAIN, EPIGASTRIC: ICD-10-CM

## 2022-08-24 LAB
C COLI+JEJUNI+LARI FUSA STL QL NAA+PROBE: NOT DETECTED
C PARVUM AG STL QL IA: NEGATIVE
EC STX1 GENE STL QL NAA+PROBE: NOT DETECTED
EC STX2 GENE STL QL NAA+PROBE: NOT DETECTED
G LAMBLIA AG STL QL IA: NEGATIVE
NOROV GI+II ORF1-ORF2 JNC STL QL NAA+PR: NOT DETECTED
O+P STL MICRO: NEGATIVE
RVA NSP5 STL QL NAA+PROBE: NOT DETECTED
SALMONELLA SP RPOD STL QL NAA+PROBE: NOT DETECTED
SHIGELLA SP+EIEC IPAH STL QL NAA+PROBE: NOT DETECTED
V CHOL+PARA RFBL+TRKH+TNAA STL QL NAA+PR: NOT DETECTED
Y ENTERO RECN STL QL NAA+PROBE: NOT DETECTED

## 2022-08-24 PROCEDURE — 87338 HPYLORI STOOL AG IA: CPT

## 2022-08-25 LAB
TTG IGA SER-ACNC: 0.6 U/ML
TTG IGG SER-ACNC: <0.6 U/ML

## 2022-08-26 LAB — H PYLORI AG STL QL IA: NEGATIVE

## 2022-09-13 ENCOUNTER — HOSPITAL ENCOUNTER (OUTPATIENT)
Dept: BONE DENSITY | Facility: CLINIC | Age: 67
Discharge: HOME | End: 2022-09-13
Attending: NURSE PRACTITIONER
Payer: COMMERCIAL

## 2022-09-13 ENCOUNTER — HOSPITAL ENCOUNTER (OUTPATIENT)
Dept: MAMMOGRAPHY | Facility: CLINIC | Age: 67
Discharge: HOME | End: 2022-09-13
Attending: NURSE PRACTITIONER
Payer: COMMERCIAL

## 2022-09-13 DIAGNOSIS — Z12.31 VISIT FOR SCREENING MAMMOGRAM: ICD-10-CM

## 2022-09-13 DIAGNOSIS — M81.0 AGE-RELATED OSTEOPOROSIS WITHOUT CURRENT PATHOLOGICAL FRACTURE: ICD-10-CM

## 2022-09-13 PROCEDURE — 77067 SCR MAMMO BI INCL CAD: CPT

## 2022-09-13 PROCEDURE — 77080 DXA BONE DENSITY AXIAL: CPT

## 2022-11-25 ENCOUNTER — OFFICE VISIT (OUTPATIENT)
Dept: URGENT CARE | Facility: URGENT CARE | Age: 67
End: 2022-11-25
Payer: COMMERCIAL

## 2022-11-25 VITALS
TEMPERATURE: 99 F | WEIGHT: 157.4 LBS | OXYGEN SATURATION: 98 % | HEART RATE: 87 BPM | RESPIRATION RATE: 18 BRPM | BODY MASS INDEX: 24.29 KG/M2 | SYSTOLIC BLOOD PRESSURE: 135 MMHG | DIASTOLIC BLOOD PRESSURE: 90 MMHG

## 2022-11-25 DIAGNOSIS — J20.9 ACUTE BRONCHITIS, UNSPECIFIED ORGANISM: Primary | ICD-10-CM

## 2022-11-25 DIAGNOSIS — J10.1 INFLUENZA A: ICD-10-CM

## 2022-11-25 DIAGNOSIS — R05.1 ACUTE COUGH: ICD-10-CM

## 2022-11-25 LAB
DEPRECATED S PYO AG THROAT QL EIA: NEGATIVE
FLUAV AG SPEC QL IA: POSITIVE
FLUBV AG SPEC QL IA: NEGATIVE
GROUP A STREP BY PCR: NOT DETECTED
SARS-COV-2 RNA RESP QL NAA+PROBE: NEGATIVE

## 2022-11-25 PROCEDURE — 99213 OFFICE O/P EST LOW 20 MIN: CPT | Mod: CS | Performed by: INTERNAL MEDICINE

## 2022-11-25 PROCEDURE — U0005 INFEC AGEN DETEC AMPLI PROBE: HCPCS | Performed by: INTERNAL MEDICINE

## 2022-11-25 PROCEDURE — 87651 STREP A DNA AMP PROBE: CPT | Performed by: INTERNAL MEDICINE

## 2022-11-25 PROCEDURE — 87804 INFLUENZA ASSAY W/OPTIC: CPT

## 2022-11-25 PROCEDURE — U0003 INFECTIOUS AGENT DETECTION BY NUCLEIC ACID (DNA OR RNA); SEVERE ACUTE RESPIRATORY SYNDROME CORONAVIRUS 2 (SARS-COV-2) (CORONAVIRUS DISEASE [COVID-19]), AMPLIFIED PROBE TECHNIQUE, MAKING USE OF HIGH THROUGHPUT TECHNOLOGIES AS DESCRIBED BY CMS-2020-01-R: HCPCS | Performed by: INTERNAL MEDICINE

## 2022-11-25 RX ORDER — CYCLOBENZAPRINE HCL 5 MG
5 TABLET ORAL 3 TIMES DAILY PRN
Qty: 20 TABLET | Refills: 0 | Status: SHIPPED | OUTPATIENT
Start: 2022-11-25 | End: 2023-04-25

## 2022-11-25 RX ORDER — MULTIPLE VITAMINS W/ MINERALS TAB 9MG-400MCG
1 TAB ORAL DAILY
COMMUNITY

## 2022-11-25 RX ORDER — ALBUTEROL SULFATE 90 UG/1
2 AEROSOL, METERED RESPIRATORY (INHALATION) EVERY 6 HOURS
Qty: 18 G | Refills: 0 | Status: SHIPPED | OUTPATIENT
Start: 2022-11-25 | End: 2023-06-18

## 2022-11-25 RX ORDER — CALCIUM CARBONATE 500(1250)
1 TABLET ORAL 2 TIMES DAILY
COMMUNITY
End: 2023-04-25

## 2022-11-25 NOTE — PROGRESS NOTES
Assessment & Plan     Acute bronchitis, unspecified organism  Albuterol    Influenza A  - albuterol (PROAIR HFA/PROVENTIL HFA/VENTOLIN HFA) 108 (90 Base) MCG/ACT inhaler; Inhale 2 puffs into the lungs every 6 hours  - cyclobenzaprine (FLEXERIL) 5 MG tablet; Take 1 tablet (5 mg) by mouth 3 times daily as needed for muscle spasms    Cough  - Symptomatic; Unknown COVID-19 Virus (Coronavirus) by PCR Nose  - Streptococcus A Rapid Screen w/Reflex to PCR - Clinic Collect  - Influenza A/B antigen  - Group A Streptococcus PCR Throat Swab    Amrit Mace MD  [facility] RANDALL Atwood is a 67 year old, presenting for the following health issues:  Cough (Patient presents to the U/ with complains cough, sore diagraph, post nasal drop,  loss of appetite, and vomiting which been going on since Tuesday. )      HPI   Concern with fevers, myalgias, cough, runny nose.  Muscle achiness and pain is the primary symptoms.     Review of Systems   Constitutional, HEENT, cardiovascular, pulmonary, gi and gu systems are negative, except as otherwise noted.      Objective    BP (!) 135/90 (BP Location: Left arm, Patient Position: Sitting, Cuff Size: Adult Regular)   Pulse 87   Temp 99  F (37.2  C) (Oral)   Resp 18   Wt 71.4 kg (157 lb 6.4 oz)   SpO2 98%   BMI 24.29 kg/m    Body mass index is 24.29 kg/m .  Physical Exam   GENERAL APPEARANCE: alert and no distress  HENT: ear canals and TM's normal and cobblestoning of the posterior pharynx with post-nasal drainage   NECK: no adenopathy and no asymmetry, masses, or scars  RESP: expiratory wheezes throughout  CV: regular rates and rhythm, normal S1 S2, no S3 or S4 and no murmur, click or rub    Results for orders placed or performed in visit on 11/25/22 (from the past 24 hour(s))   Streptococcus A Rapid Screen w/Reflex to PCR - Clinic Collect    Specimen: Throat; Swab   Result Value Ref Range    Group A Strep antigen Negative Negative   Influenza A/B  antigen    Specimen: Nose; Swab   Result Value Ref Range    Influenza A antigen Positive (A) Negative    Influenza B antigen Negative Negative    Narrative    Test results must be correlated with clinical data. If necessary, results should be confirmed by a molecular assay or viral culture.

## 2022-12-13 ENCOUNTER — VIRTUAL VISIT (OUTPATIENT)
Dept: INTERNAL MEDICINE | Facility: CLINIC | Age: 67
End: 2022-12-13
Payer: COMMERCIAL

## 2022-12-13 ENCOUNTER — E-VISIT (OUTPATIENT)
Dept: FAMILY MEDICINE | Facility: CLINIC | Age: 67
End: 2022-12-13

## 2022-12-13 ENCOUNTER — NURSE TRIAGE (OUTPATIENT)
Dept: INTERNAL MEDICINE | Facility: CLINIC | Age: 67
End: 2022-12-13

## 2022-12-13 DIAGNOSIS — Z53.9 ERRONEOUS ENCOUNTER--DISREGARD: Primary | ICD-10-CM

## 2022-12-13 DIAGNOSIS — J20.9 ACUTE BRONCHITIS WITH SYMPTOMS > 10 DAYS: Primary | ICD-10-CM

## 2022-12-13 PROCEDURE — 99213 OFFICE O/P EST LOW 20 MIN: CPT | Mod: 95 | Performed by: PHYSICIAN ASSISTANT

## 2022-12-13 RX ORDER — DOXYCYCLINE 100 MG/1
100 CAPSULE ORAL 2 TIMES DAILY
Qty: 20 CAPSULE | Refills: 0 | Status: SHIPPED | OUTPATIENT
Start: 2022-12-13 | End: 2022-12-23

## 2022-12-13 RX ORDER — PREDNISONE 20 MG/1
20 TABLET ORAL DAILY
Qty: 5 TABLET | Refills: 0 | Status: SHIPPED | OUTPATIENT
Start: 2022-12-13 | End: 2023-04-25

## 2022-12-13 ASSESSMENT — ENCOUNTER SYMPTOMS: COUGH: 1

## 2022-12-13 ASSESSMENT — PATIENT HEALTH QUESTIONNAIRE - PHQ9
SUM OF ALL RESPONSES TO PHQ QUESTIONS 1-9: 8
SUM OF ALL RESPONSES TO PHQ QUESTIONS 1-9: 8
10. IF YOU CHECKED OFF ANY PROBLEMS, HOW DIFFICULT HAVE THESE PROBLEMS MADE IT FOR YOU TO DO YOUR WORK, TAKE CARE OF THINGS AT HOME, OR GET ALONG WITH OTHER PEOPLE: VERY DIFFICULT

## 2022-12-13 NOTE — TELEPHONE ENCOUNTER
"Nurse Triage SBAR    Is this a 2nd Level Triage? YES, LICENSED PRACTITIONER REVIEW IS REQUIRED    Situation: Received a call from the patient stating she feels like her cough has been getting worse since her urgent care visit on 11/25/22.     Background: Patient was seen in urgent care on 11/25/22 and came back positive for the flu. Patient was prescribed an inhaler (which helps a little bit) but her symptoms started back up again this past weekend including a constant cough.     Assessment: Upon conversing with the patient, the patient states she had a 5 hour cough attack the other day and she has been having a hard time sleeping at night. Patient states she is coughing up milky secretions and sometimes yellow in color. Patient also states she is having \"a lot of wheezing.\" Denies feeling SOB. No chest pain. Fever has subsided.     Protocol Recommended Disposition:   Go To Office Now     Recommendation: Triage protocol recommending patient go to the office now. Triage RN scheduled patient for an appointment with Urvashi Lang this afternoon to discuss her symptoms and the possible need for an antibiotic. Triage RN informed patient if her symptoms get worse between now and then--call the clinic back or go to urgent care.     Does the patient meet one of the following criteria for ADS visit consideration? 16+ years old, no PCP (internal or external) but seen at [facility]     TIP  Providers, please consider if this condition is appropriate for management at one of our Acute and Diagnostic Services sites.     If patient is a good candidate, please use dotphrase <dot>triageresponse and select Refer to ADS to document.    1. ONSET: \"When did the cough begin?\"       This weekend  2. SEVERITY: \"How bad is the cough today?\"       Hard to sleep, constant  3. SPUTUM: \"Describe the color of your sputum\" (none, dry cough; clear, white, yellow, green)      Milky secretion, sometimes yellow, constant  4. HEMOPTYSIS: " "\"Are you coughing up any blood?\" If so ask: \"How much?\" (flecks, streaks, tablespoons, etc.)      No blood present  5. DIFFICULTY BREATHING: \"Are you having difficulty breathing?\" If Yes, ask: \"How bad is it?\" (e.g., mild, moderate, severe)     - MILD: No SOB at rest, mild SOB with walking, speaks normally in sentences, can lie down, no retractions, pulse < 100.       \"A lot of wheezing\", no SOB  6. FEVER: \"Do you have a fever?\" If Yes, ask: \"What is your temperature, how was it measured, and when did it start?\"      Fever has subsided  7. CARDIAC HISTORY: \"Do you have any history of heart disease?\" (e.g., heart attack, congestive heart failure)       No  8. LUNG HISTORY: \"Do you have any history of lung disease?\"  (e.g., pulmonary embolus, asthma, emphysema)      No  9. PE RISK FACTORS: \"Do you have a history of blood clots?\" (or: recent major surgery, recent prolonged travel, bedridden)      No  10. OTHER SYMPTOMS: \"Do you have any other symptoms?\" (e.g., runny nose, wheezing, chest pain)        Wheezing, no runny nose, no earache, no chest pain  11. PREGNANCY: \"Is there any chance you are pregnant?\" \"When was your last menstrual period?\"        No  12. TRAVEL: \"Have you traveled out of the country in the last month?\" (e.g., travel history, exposures)        No    Reason for Disposition    Wheezing is present    Additional Information    Negative: Bluish (or gray) lips or face    Negative: SEVERE difficulty breathing (e.g., struggling for each breath, speaks in single words)    Negative: Rapid onset of cough and has hives    Negative: Coughing started suddenly after medicine, an allergic food or bee sting    Negative: Difficulty breathing after exposure to flames, smoke, or fumes    Negative: Sounds like a life-threatening emergency to the triager    Negative: Previous asthma attacks and this feels like asthma attack    Negative: Dry cough (non-productive; no sputum or minimal clear sputum) and within 14 days of " COVID-19 Exposure    Negative: MODERATE difficulty breathing (e.g., speaks in phrases, SOB even at rest, pulse 100-120) and still present when not coughing    Negative: Chest pain present when not coughing    Negative: Passed out (i.e., fainted, collapsed and was not responding)    Negative: Patient sounds very sick or weak to the triager    Negative: MILD difficulty breathing (e.g., minimal/no SOB at rest, SOB with walking, pulse <100) and still present when not coughing    Negative: Coughed up > 1 tablespoon (15 ml) blood (Exception: Blood-tinged sputum.)    Negative: Fever > 103 F (39.4 C)    Negative: Fever > 101 F (38.3 C) and over 60 years of age    Negative: Fever > 100.0 F (37.8 C) and has diabetes mellitus or a weak immune system (e.g., HIV positive, cancer chemotherapy, organ transplant, splenectomy, chronic steroids)    Negative: Fever > 100.0 F (37.8 C) and bedridden (e.g., nursing home patient, stroke, chronic illness, recovering from surgery)    Negative: Increasing ankle swelling    Protocols used: COUGH-A-OH    Franny Olvera RN

## 2022-12-13 NOTE — PROGRESS NOTES
Angelic is a 67 year old who is being evaluated via a billable video visit.      How would you like to obtain your AVS? MyChart  If the video visit is dropped, the invitation should be resent by: Text to cell phone: [phone]  Will anyone else be joining your video visit? No          Assessment & Plan     Acute bronchitis with symptoms > 10 days  - doxycycline hyclate (VIBRAMYCIN) 100 MG capsule; Take 1 capsule (100 mg) by mouth 2 times daily for 10 days  - predniSONE (DELTASONE) 20 MG tablet; Take 1 tablet (20 mg) by mouth daily             See Patient Instructions    Return in about 2 weeks (around 12/27/2022) for recheck if not improving, regular primary provider.    Urvashi Lang PA-C  [facility]   Angelic is a 67 year old, presenting for the following health issues:  Cough      Cough    History of Present Illness       Reason for visit:  To treat Flu related bronchial infection    She eats 2-3 servings of fruits and vegetables daily.She consumes 2 sweetened beverage(s) daily.She exercises with enough effort to increase her heart rate 20 to 29 minutes per day.  She exercises with enough effort to increase her heart rate 3 or less days per week.   She is taking medications regularly.    Today's PHQ-9         PHQ-9 Total Score: 8    PHQ-9 Q9 Thoughts of better off dead/self-harm past 2 weeks :   Not at all    How difficult have these problems made it for you to do your work, take care of things at home, or get along with other people: Very difficult       Acute Illness  Acute illness concerns: cough  Onset/Duration: 3 weeks  Symptoms:  Fever: No  Chills/Sweats: on and off when sleeping  Headache (location?): No  Sinus Pressure: No  Conjunctivitis:  No  Ear Pain: no  Rhinorrhea: No  Congestion: YES  Sore Throat: No  Cough: YES-productive of clear sputum, productive of yellow sputum  Wheeze: YES  Decreased Appetite: YES  Nausea: YES  Vomiting: No  Diarrhea:  "YES  Dysuria/Freq.: No  Dysuria or Hematuria: No  Fatigue/Achiness: YES  Sick/Strep Exposure: YES  Therapies tried and outcome: inhaler, tussin, nyquil      Review of Systems   Respiratory: Positive for cough.             Objective    Vitals - Patient Reported  Weight (Patient Reported): 70.3 kg (155 lb)  Height (Patient Reported): 171.5 cm (5' 7.5\")  BMI (Based on Pt Reported Ht/Wt): 23.92        Physical Exam   GENERAL: Healthy, alert and no distress  EYES: Eyes grossly normal to inspection.  No discharge or erythema, or obvious scleral/conjunctival abnormalities.  RESP: No audible wheeze, cough, or visible cyanosis.  No visible retractions or increased work of breathing.    SKIN: Visible skin clear. No significant rash, abnormal pigmentation or lesions.  NEURO: Cranial nerves grossly intact.  Mentation and speech appropriate for age.  PSYCH: Mentation appears normal, affect normal/bright, judgement and insight intact, normal speech and appearance well-groomed.                Video-Visit Details    Video Start Time: 3:17 pm    Type of service:  Video Visit    Video End Time:3:25 PM    Originating Location (pt. Location): Home        Distant Location (provider location):  Off-site    Platform used for Video Visit: Jorge    "

## 2022-12-13 NOTE — PATIENT INSTRUCTIONS
Thank you for choosing us for your care. I think an in-clinic visit would be best next steps based on your symptoms. Please schedule a clinic appointment; you won t be charged for this eVisit.      You can schedule an appointment right here in [facility], or call [phone]

## 2022-12-26 ENCOUNTER — HEALTH MAINTENANCE LETTER (OUTPATIENT)
Age: 67
End: 2022-12-26

## 2023-03-31 DIAGNOSIS — B00.9 HSV (HERPES SIMPLEX VIRUS) INFECTION: ICD-10-CM

## 2023-03-31 NOTE — TELEPHONE ENCOUNTER
Upon chart review, previous Graceharriet Galvez patient. Patient is scheduled for an appointment to establish care with Dr. Morales next month.     Appointments in Next Year    Apr 25, 2023  8:30 AM  (Arrive by 8:10 AM)  Provider Visit with Tyron Morales MD  [facility] ([facility] ) [phone]        Will route refill request to Dr. Morales for review.     Franny Olvera RN

## 2023-04-03 RX ORDER — VALACYCLOVIR HYDROCHLORIDE 500 MG/1
500 TABLET, FILM COATED ORAL 2 TIMES DAILY
Qty: 6 TABLET | Refills: 6 | Status: SHIPPED | OUTPATIENT
Start: 2023-04-03 | End: 2023-08-16

## 2023-04-25 ENCOUNTER — OFFICE VISIT (OUTPATIENT)
Dept: INTERNAL MEDICINE | Facility: CLINIC | Age: 68
End: 2023-04-25
Payer: COMMERCIAL

## 2023-04-25 VITALS
BODY MASS INDEX: 24.54 KG/M2 | OXYGEN SATURATION: 100 % | HEART RATE: 61 BPM | TEMPERATURE: 97.7 F | SYSTOLIC BLOOD PRESSURE: 142 MMHG | WEIGHT: 159 LBS | DIASTOLIC BLOOD PRESSURE: 86 MMHG

## 2023-04-25 DIAGNOSIS — E78.5 HYPERLIPIDEMIA LDL GOAL <130: ICD-10-CM

## 2023-04-25 DIAGNOSIS — B00.9 HSV (HERPES SIMPLEX VIRUS) INFECTION: ICD-10-CM

## 2023-04-25 DIAGNOSIS — Z23 NEED FOR VACCINATION: ICD-10-CM

## 2023-04-25 DIAGNOSIS — R53.83 OTHER FATIGUE: Primary | ICD-10-CM

## 2023-04-25 DIAGNOSIS — M72.0 DUPUYTREN CONTRACTURE: ICD-10-CM

## 2023-04-25 DIAGNOSIS — Z23 ENCOUNTER FOR IMMUNIZATION: ICD-10-CM

## 2023-04-25 DIAGNOSIS — R14.0 ABDOMINAL BLOATING: ICD-10-CM

## 2023-04-25 DIAGNOSIS — M81.0 AGE-RELATED OSTEOPOROSIS WITHOUT CURRENT PATHOLOGICAL FRACTURE: ICD-10-CM

## 2023-04-25 DIAGNOSIS — F33.42 RECURRENT MAJOR DEPRESSIVE DISORDER, IN FULL REMISSION (H): ICD-10-CM

## 2023-04-25 LAB
ANION GAP SERPL CALCULATED.3IONS-SCNC: 14 MMOL/L (ref 7–15)
BASOPHILS # BLD AUTO: 0 10E3/UL (ref 0–0.2)
BASOPHILS NFR BLD AUTO: 0 %
BUN SERPL-MCNC: 17.8 MG/DL (ref 8–23)
CALCIUM SERPL-MCNC: 9.4 MG/DL (ref 8.8–10.2)
CHLORIDE SERPL-SCNC: 104 MMOL/L (ref 98–107)
CHOLEST SERPL-MCNC: 221 MG/DL
CREAT SERPL-MCNC: 0.59 MG/DL (ref 0.51–0.95)
DEPRECATED HCO3 PLAS-SCNC: 23 MMOL/L (ref 22–29)
EOSINOPHIL # BLD AUTO: 0.2 10E3/UL (ref 0–0.7)
EOSINOPHIL NFR BLD AUTO: 3 %
ERYTHROCYTE [DISTWIDTH] IN BLOOD BY AUTOMATED COUNT: 12.5 % (ref 10–15)
GFR SERPL CREATININE-BSD FRML MDRD: >90 ML/MIN/1.73M2
GLUCOSE SERPL-MCNC: 104 MG/DL (ref 70–99)
HCT VFR BLD AUTO: 42.6 % (ref 35–47)
HDLC SERPL-MCNC: 96 MG/DL
HGB BLD-MCNC: 13.7 G/DL (ref 11.7–15.7)
LDLC SERPL CALC-MCNC: 105 MG/DL
LYMPHOCYTES # BLD AUTO: 2 10E3/UL (ref 0.8–5.3)
LYMPHOCYTES NFR BLD AUTO: 31 %
MCH RBC QN AUTO: 31.6 PG (ref 26.5–33)
MCHC RBC AUTO-ENTMCNC: 32.2 G/DL (ref 31.5–36.5)
MCV RBC AUTO: 98 FL (ref 78–100)
MONOCYTES # BLD AUTO: 0.7 10E3/UL (ref 0–1.3)
MONOCYTES NFR BLD AUTO: 10 %
NEUTROPHILS # BLD AUTO: 3.6 10E3/UL (ref 1.6–8.3)
NEUTROPHILS NFR BLD AUTO: 56 %
NONHDLC SERPL-MCNC: 125 MG/DL
PLATELET # BLD AUTO: 185 10E3/UL (ref 150–450)
POTASSIUM SERPL-SCNC: 4.1 MMOL/L (ref 3.4–5.3)
RBC # BLD AUTO: 4.34 10E6/UL (ref 3.8–5.2)
SODIUM SERPL-SCNC: 141 MMOL/L (ref 136–145)
TRIGL SERPL-MCNC: 100 MG/DL
TSH SERPL DL<=0.005 MIU/L-ACNC: 0.95 UIU/ML (ref 0.3–4.2)
WBC # BLD AUTO: 6.4 10E3/UL (ref 4–11)

## 2023-04-25 PROCEDURE — G0009 ADMIN PNEUMOCOCCAL VACCINE: HCPCS | Performed by: INTERNAL MEDICINE

## 2023-04-25 PROCEDURE — 90677 PCV20 VACCINE IM: CPT | Performed by: INTERNAL MEDICINE

## 2023-04-25 PROCEDURE — 84443 ASSAY THYROID STIM HORMONE: CPT | Performed by: INTERNAL MEDICINE

## 2023-04-25 PROCEDURE — 80048 BASIC METABOLIC PNL TOTAL CA: CPT | Performed by: INTERNAL MEDICINE

## 2023-04-25 PROCEDURE — 99214 OFFICE O/P EST MOD 30 MIN: CPT | Mod: 25 | Performed by: INTERNAL MEDICINE

## 2023-04-25 PROCEDURE — 36415 COLL VENOUS BLD VENIPUNCTURE: CPT | Performed by: INTERNAL MEDICINE

## 2023-04-25 PROCEDURE — 85025 COMPLETE CBC W/AUTO DIFF WBC: CPT | Performed by: INTERNAL MEDICINE

## 2023-04-25 PROCEDURE — 80061 LIPID PANEL: CPT | Performed by: INTERNAL MEDICINE

## 2023-04-25 RX ORDER — FAMOTIDINE 20 MG/1
20 TABLET, FILM COATED ORAL DAILY
Qty: 90 TABLET | Refills: 4 | Status: SHIPPED | OUTPATIENT
Start: 2023-04-25

## 2023-04-25 RX ORDER — SERTRALINE HYDROCHLORIDE 100 MG/1
150 TABLET, FILM COATED ORAL DAILY
Qty: 135 TABLET | Refills: 4 | Status: SHIPPED | OUTPATIENT
Start: 2023-04-25

## 2023-04-25 RX ORDER — VALACYCLOVIR HYDROCHLORIDE 500 MG/1
500 TABLET, FILM COATED ORAL DAILY
Qty: 90 TABLET | Refills: 4 | Status: SHIPPED | OUTPATIENT
Start: 2023-04-25 | End: 2023-06-18

## 2023-04-25 RX ORDER — ATORVASTATIN CALCIUM 20 MG/1
20 TABLET, FILM COATED ORAL DAILY
Qty: 90 TABLET | Refills: 4 | Status: SHIPPED | OUTPATIENT
Start: 2023-04-25

## 2023-04-25 ASSESSMENT — PATIENT HEALTH QUESTIONNAIRE - PHQ9
SUM OF ALL RESPONSES TO PHQ QUESTIONS 1-9: 5
10. IF YOU CHECKED OFF ANY PROBLEMS, HOW DIFFICULT HAVE THESE PROBLEMS MADE IT FOR YOU TO DO YOUR WORK, TAKE CARE OF THINGS AT HOME, OR GET ALONG WITH OTHER PEOPLE: NOT DIFFICULT AT ALL
SUM OF ALL RESPONSES TO PHQ QUESTIONS 1-9: 5

## 2023-04-25 NOTE — PATIENT INSTRUCTIONS
- Endocrinology referral for osteoporosis  - Hand referral placed  - I will send you a message on [place] when I am able to look at the results of your tests from today  - Start Valtrex daily  - Refilled sertraline and atorvastatin  - Try famotidine instead of omeprazole  - Try Low FODMAP diet (see handout)

## 2023-04-25 NOTE — PROGRESS NOTES
Assessment & Plan   Other fatigue  She reports sleeping ~10 hours a day, but denies really any issues for that. Discussed that can be a normal part of aging, and if she has no symptoms/concerns, then I have no immediate concerns either. She requested thyroid screening. Reviewed that her TSH has been screened 3 times in the last 18 months or so. She reports a strong FH. I recommended stopping TSH screening (especially so frequently) if this check is also normal.  - CBC with Platelets & Differential; Future  - TSH with free T4 reflex; Future  - Basic metabolic panel; Future    Dupuytren contracture  Referral to hand specialist placed per request.  - Orthopedic  Referral; Future    HSV (herpes simplex virus) infection  Discussed switching from intermittent treatment to daily suppressive therapy and she was interested. Script updated.  - valACYclovir (VALTREX) 500 MG tablet; Take 1 tablet (500 mg) by mouth daily    Abdominal bloating  Will trial H2 blocker instead of PPI. Also discussed Low FODMAP diet. [facility] handout regarding this diet printed off and given to patient.  - famotidine (PEPCID) 20 MG tablet; Take 1 tablet (20 mg) by mouth daily    Age-related osteoporosis without current pathological fracture  Reports intolerance to alendronate in past and is requesting endocrinology referral to discuss other options. I have placed that referral.  - Adult Endocrinology  Referral; Future    Recurrent major depressive disorder, in full remission (H)  Refilled chronic medication at current dose per request.  - sertraline (ZOLOFT) 100 MG tablet; Take 1.5 tablets (150 mg) by mouth daily    Hyperlipidemia LDL goal <130  Fasting labs today. Refilled chronic medication at current dose.  - Lipid panel reflex to direct LDL Fasting; Future  - atorvastatin (LIPITOR) 20 MG tablet; Take 1 tablet (20 mg) by mouth daily    Tyron Ramos MD  [facility]    Vladimir Atwood is a  68 year old who presents today to discuss a few concerns. She would like to see a hand specialist for dupuytren contracture as well as an endocrinologist for osteoporosis. She reports she did not tolerate alendronate therapy in the past. She sleeps 10ish hours a day and is wondering if that's normal. She endorses some fatigue but largely feels well. She's hoping for thyroid screening due to family history. She has had persistent abdominal bloating. She's tried omeprazole but has concerns about taking that daily. GasX helps on occasion. She has a colonoscopy scheduled for next month. She has noted more frequent HSV outbreaks, now every other month or so. She reports she's more sexually active these days. She declined STD screening today.    Review of Systems   Constitutional, gi, MSK, gu systems are negative, except as otherwise noted.      Objective    BP (!) 142/86   Pulse 61   Temp 97.7  F (36.5  C) (Temporal)   Wt 72.1 kg (159 lb)   SpO2 100%   BMI 24.54 kg/m    Body mass index is 24.54 kg/m .     Physical Exam   GENERAL: alert and in no distress.  EYES: conjunctivae/corneas clear. EOMs grossly intact  HENT: Facies symmetric.  RESP: No iWOB.  MSK: Moves all four extremities freely  SKIN: No significant ulcers, lesions, or rashes on the visualized portions of the skin  NEURO: CN II-XII grossly intact.

## 2023-05-09 ENCOUNTER — OFFICE VISIT (OUTPATIENT)
Dept: URGENT CARE | Facility: URGENT CARE | Age: 68
End: 2023-05-09
Payer: COMMERCIAL

## 2023-05-09 VITALS
WEIGHT: 158 LBS | SYSTOLIC BLOOD PRESSURE: 128 MMHG | TEMPERATURE: 99.4 F | DIASTOLIC BLOOD PRESSURE: 83 MMHG | OXYGEN SATURATION: 97 % | BODY MASS INDEX: 24.38 KG/M2 | HEART RATE: 78 BPM

## 2023-05-09 DIAGNOSIS — R06.2 WHEEZE: ICD-10-CM

## 2023-05-09 DIAGNOSIS — R05.8 PRODUCTIVE COUGH: Primary | ICD-10-CM

## 2023-05-09 PROCEDURE — 99214 OFFICE O/P EST MOD 30 MIN: CPT | Mod: CS | Performed by: FAMILY MEDICINE

## 2023-05-09 PROCEDURE — U0005 INFEC AGEN DETEC AMPLI PROBE: HCPCS | Performed by: FAMILY MEDICINE

## 2023-05-09 PROCEDURE — U0003 INFECTIOUS AGENT DETECTION BY NUCLEIC ACID (DNA OR RNA); SEVERE ACUTE RESPIRATORY SYNDROME CORONAVIRUS 2 (SARS-COV-2) (CORONAVIRUS DISEASE [COVID-19]), AMPLIFIED PROBE TECHNIQUE, MAKING USE OF HIGH THROUGHPUT TECHNOLOGIES AS DESCRIBED BY CMS-2020-01-R: HCPCS | Performed by: FAMILY MEDICINE

## 2023-05-09 RX ORDER — DOXYCYCLINE 100 MG/1
100 TABLET ORAL 2 TIMES DAILY
Qty: 14 TABLET | Refills: 0 | Status: SHIPPED | OUTPATIENT
Start: 2023-05-09 | End: 2023-05-16

## 2023-05-09 RX ORDER — ALBUTEROL SULFATE 90 UG/1
2 AEROSOL, METERED RESPIRATORY (INHALATION) EVERY 6 HOURS
Qty: 18 G | Refills: 0 | Status: SHIPPED | OUTPATIENT
Start: 2023-05-09 | End: 2023-09-13

## 2023-05-09 NOTE — PROGRESS NOTES
SUBJECTIVE: Angelic Correa is a 68 year old female presenting with a chief complaint of cough .  Onset of symptoms was 10 day(s) ago.  Course of illness is worsening.    Severity moderate  Current and Associated symptoms: cough - productive  Predisposing factors include None.    Past Medical History:   Diagnosis Date     Glaucoma      Allergies   Allergen Reactions     Apraclonidine Nausea and Vomiting     Bupropion GI Disturbance and Nausea     Generic only     Macrobid [Nitrofurantoin] Nausea and Vomiting     No Clinical Screening - See Comments      seasonal, cosmetic     Pilocarpine Nausea and Vomiting     Sulfa Antibiotics Unknown     Social History     Tobacco Use     Smoking status: Former     Types: Cigarettes     Quit date: 1996     Years since quittin.3     Smokeless tobacco: Never   Vaping Use     Vaping status: Never Used   Substance Use Topics     Alcohol use: Yes     Comment: 2-3 drinks daily       ROS:  SKIN: no rash  GI: no vomiting    OBJECTIVE:  /83   Pulse 78   Temp 99.4  F (37.4  C) (Oral)   Wt 71.7 kg (158 lb)   SpO2 97%   BMI 24.38 kg/m  GENERAL APPEARANCE: healthy, alert and no distress  EYES: EOMI,  PERRL, conjunctiva clear  HENT: ear canals and TM's normal.  Nose and mouth without ulcers, erythema or lesions  RESP: expiratory wheezes throughout  SKIN: no suspicious lesions or rashes      ICD-10-CM    1. Productive cough  R05.8 doxycycline monohydrate (ADOXA) 100 MG tablet     Symptomatic COVID-19 Virus (Coronavirus) by PCR      2. Wheeze  R06.2 albuterol (PROAIR HFA) 108 (90 Base) MCG/ACT inhaler        Declines prednisone  Fluids/Rest, f/u if worse/not any better

## 2023-05-10 LAB — SARS-COV-2 RNA RESP QL NAA+PROBE: NEGATIVE

## 2023-05-16 ENCOUNTER — OFFICE VISIT (OUTPATIENT)
Dept: URGENT CARE | Facility: URGENT CARE | Age: 68
End: 2023-05-16
Payer: COMMERCIAL

## 2023-05-16 VITALS
TEMPERATURE: 97.7 F | HEART RATE: 74 BPM | RESPIRATION RATE: 16 BRPM | SYSTOLIC BLOOD PRESSURE: 138 MMHG | OXYGEN SATURATION: 98 % | DIASTOLIC BLOOD PRESSURE: 88 MMHG

## 2023-05-16 DIAGNOSIS — R05.3 PERSISTENT COUGH: Primary | ICD-10-CM

## 2023-05-16 PROCEDURE — 99213 OFFICE O/P EST LOW 20 MIN: CPT | Performed by: PHYSICIAN ASSISTANT

## 2023-05-16 RX ORDER — AZITHROMYCIN 250 MG/1
TABLET, FILM COATED ORAL
Qty: 6 TABLET | Refills: 0 | Status: SHIPPED | OUTPATIENT
Start: 2023-05-16 | End: 2023-05-21

## 2023-05-16 RX ORDER — PREDNISONE 20 MG/1
20 TABLET ORAL DAILY
Qty: 5 TABLET | Refills: 0 | Status: SHIPPED | OUTPATIENT
Start: 2023-05-16 | End: 2023-05-21

## 2023-05-16 RX ORDER — BENZONATATE 100 MG/1
100 CAPSULE ORAL 3 TIMES DAILY PRN
Qty: 30 CAPSULE | Refills: 0 | Status: SHIPPED | OUTPATIENT
Start: 2023-05-16 | End: 2023-06-18

## 2023-05-16 NOTE — PROGRESS NOTES
Assessment & Plan     Persistent cough  Not improved following recent doxycycline antibiotic intake.  On exam patient is in no acute respiratory distress.  Tessalon Perles prescribed as needed for cough.  Prednisone is prescribed to help with the wheezing.  Zithromax is prescribed today.  Keep monitoring symptoms.  Continue albuterol inhaler as needed for shortness of breath/wheezing.  Follow-up if any worsening symptoms.  Patient agrees with the plan.  - predniSONE (DELTASONE) 20 MG tablet  Dispense: 5 tablet; Refill: 0  - azithromycin (ZITHROMAX) 250 MG tablet  Dispense: 6 tablet; Refill: 0  - benzonatate (TESSALON) 100 MG capsule  Dispense: 30 capsule; Refill: 0       Return in about 1 week (around 5/23/2023) for Symptoms failing to improve.    Ankita Munoz PA-C  [facility] [place]    Vladimir Atwood is a 68 year old female who presents to clinic today for the following health issues:  Chief Complaint   Patient presents with     Cough     Cough, left sided chest congestion X 1 week. Scratchy throat.   Pt reports recently she was taking antibiotics along with an antacid.      HPI  Patient recently treated for bronchitis with doxycycline and albuterol inhaler presenting to urgent care today with a complaint of ongoing cough. Initial onset of symptoms 10 days ago.  No fever.  Negative COVID test a week ago. Cough is  occasionally productive. Slight shortness of breath, feels slightly off balance. Former smoker. No Hx of COPD or asthma. Patient notes she was taking her doxycycline with an antacid which may have decreased the effectiveness of the antibiotic.        Review of Systems  Constitutional, HEENT, cardiovascular, pulmonary, GI, , musculoskeletal, neuro, skin, endocrine and psych systems are negative, except as otherwise noted.      Objective    /88 (BP Location: Right arm, Patient Position: Sitting, Cuff Size: Adult Regular)   Pulse 74   Temp 97.7  F (36.5  C) (Tympanic)    Resp 16   SpO2 98%   Physical Exam   GENERAL: healthy, alert and no distress  HENT: ear canals and TM's normal, mouth without ulcers or lesions  RESP: lungs with expiratory wheezing over the lung fields, coarse breath sounds,  CV: regular rate and rhythm, normal S1 S2  MS: no gross musculoskeletal defects noted, no edema  SKIN: no suspicious lesions or rashes

## 2023-05-28 NOTE — TELEPHONE ENCOUNTER
DIAGNOSIS: Dupuytren's Contracture   APPOINTMENT DATE: 06/20/2023     NOTES STATUS DETAILS   OFFICE NOTE from referring provider Internal 04/25/2023 - Tyron Morales MD - [facility] Internal Med   MEDICATION LIST Internal

## 2023-06-02 ENCOUNTER — HEALTH MAINTENANCE LETTER (OUTPATIENT)
Age: 68
End: 2023-06-02

## 2023-06-18 ENCOUNTER — OFFICE VISIT (OUTPATIENT)
Dept: URGENT CARE | Facility: URGENT CARE | Age: 68
End: 2023-06-18
Payer: COMMERCIAL

## 2023-06-18 VITALS
DIASTOLIC BLOOD PRESSURE: 82 MMHG | SYSTOLIC BLOOD PRESSURE: 134 MMHG | HEART RATE: 76 BPM | TEMPERATURE: 98.7 F | BODY MASS INDEX: 23.92 KG/M2 | WEIGHT: 155 LBS | OXYGEN SATURATION: 98 %

## 2023-06-18 DIAGNOSIS — F33.42 RECURRENT MAJOR DEPRESSIVE DISORDER, IN FULL REMISSION (H): ICD-10-CM

## 2023-06-18 DIAGNOSIS — K14.3: ICD-10-CM

## 2023-06-18 DIAGNOSIS — K14.0 GLOSSITIS: ICD-10-CM

## 2023-06-18 DIAGNOSIS — K14.3: Primary | ICD-10-CM

## 2023-06-18 PROCEDURE — 99213 OFFICE O/P EST LOW 20 MIN: CPT | Performed by: FAMILY MEDICINE

## 2023-06-18 RX ORDER — FLUCONAZOLE 200 MG/1
200 TABLET ORAL DAILY
Qty: 1 TABLET | Refills: 0 | Status: SHIPPED | OUTPATIENT
Start: 2023-06-18

## 2023-06-18 ASSESSMENT — ENCOUNTER SYMPTOMS
FEVER: 0
TROUBLE SWALLOWING: 0

## 2023-06-18 NOTE — TELEPHONE ENCOUNTER
Medication Question or Refill    Contacts       Type Contact Phone/Fax    06/18/2023 10:12 AM CDT Phone (Incoming) [id number] - [place], [place] - [address] (Pharmacy) [phone]          What medication are you calling about (include dose and sig)?: magic mouthwash suspension (diphenhydrAMINE, lidocaine, aluminum-magnesium & simethicone)    Preferred Pharmacy:    [facility][id number] - [place], [place] - [address] [address]  [address] [address]  Phone: [phone] Fax: [phone]      Controlled Substance Agreement on file:   CSA -- Patient Level:    CSA: None found at the patient level.       Who prescribed the medication?: David Terry    Do you need a refill? No    When did you use the medication last? n/a    Patient offered an appointment? No    Do you have any questions or concerns?  Yes: Might need a new pharmacy, chosen pharm has no ingredient to make the medication.      Could we send this information to you in [facility] or would you prefer to receive a phone call?:   Patient would prefer a phone call   Okay to leave a detailed message?: Yes at Other phone number:  [facility], [phone]

## 2023-06-18 NOTE — PROGRESS NOTES
Assessment & Plan     Lingua villosa nigra  No current findings to suggest peritonsillar or parapharyngeal abscess.  Start oral fluconazole and Magic mouthwash, if not better in 3 to 5 days, recommend ENT evaluation.  - fluconazole (DIFLUCAN) 200 MG tablet  Dispense: 1 tablet; Refill: 0  - magic mouthwash suspension (diphenhydrAMINE, lidocaine, aluminum-magnesium & simethicone)  Dispense: 280 mL; Refill: 0    Glossitis  As per above  - fluconazole (DIFLUCAN) 200 MG tablet  Dispense: 1 tablet; Refill: 0  - magic mouthwash suspension (diphenhydrAMINE, lidocaine, aluminum-magnesium & simethicone)  Dispense: 280 mL; Refill: 0    Recurrent major depressive disorder, in full remission (H)  Follow-up with PCP.          Return in about 1 week (around 6/25/2023) for If symptoms do not improve or gets worse..    David Terry MD  [facility]    Vladimir Atwood is a 68 year old, presenting for the following health issues:  Pain (Pain under her tongue, -- pain, redness x 2-3 days/)        4/25/2023     8:24 AM   Additional Questions   Roomed by Amisha posey     HPI     2-day history of pain under her tongue, associated brown to black discoloration of the tongue.  No recent antibiotic use or travel.        Review of Systems   Constitutional: Negative for fever.   HENT: Negative for trouble swallowing.             Objective    /82 (BP Location: Right arm, Patient Position: Chair, Cuff Size: Adult Regular)   Pulse 76   Temp 98.7  F (37.1  C) (Oral)   Wt 70.3 kg (155 lb)   SpO2 98%   BMI 23.92 kg/m    Body mass index is 23.92 kg/m .  Physical Exam  HENT:      Mouth/Throat:      Comments: Dark brown, black discoloration of the tongue, not scrappable.  No buccal lesions.    Uvula midline    0.5 cm erythematous polyp at the floor of the mouth

## 2023-06-20 ENCOUNTER — OFFICE VISIT (OUTPATIENT)
Dept: ORTHOPEDICS | Facility: CLINIC | Age: 68
End: 2023-06-20
Attending: INTERNAL MEDICINE
Payer: COMMERCIAL

## 2023-06-20 ENCOUNTER — PRE VISIT (OUTPATIENT)
Dept: ORTHOPEDICS | Facility: CLINIC | Age: 68
End: 2023-06-20

## 2023-06-20 DIAGNOSIS — M72.0 DUPUYTREN CONTRACTURE: ICD-10-CM

## 2023-06-20 PROCEDURE — 99204 OFFICE O/P NEW MOD 45 MIN: CPT | Performed by: STUDENT IN AN ORGANIZED HEALTH CARE EDUCATION/TRAINING PROGRAM

## 2023-06-20 ASSESSMENT — ENCOUNTER SYMPTOMS
TROUBLE SWALLOWING: 0
SMELL DISTURBANCE: 0
SINUS CONGESTION: 0
HOARSE VOICE: 0
NECK MASS: 0
SORE THROAT: 0
SINUS PAIN: 0
TASTE DISTURBANCE: 0

## 2023-06-20 NOTE — LETTER
6/20/2023         RE: Angelic Correa  [address]  [place] [address]        Dear Colleague,    Thank you for referring your patient, Angelic Correa, to the [facility] [place]. Please see a copy of my visit note below.    Ortho Hand    HPI: 68-year-old right-hand-dominant non-smoker presenting with right hand nodule with associated shocklike sensations on occasion.  Patient noticed this several months ago.  The other hand is unaffected.  This is not have any effect on her hand function in her opinion.  She really has no pain.    ROS: Negative, see HPI  Past medical history: Hyperlipidemia, depression  Past surgical history: None to the hands or wrist  Medications: No blood thinners  Allergies: Macrobid, sulfa  Social history: Non-smoker, denies any tobacco or nicotine use  Family history: No bleeding or clotting problems, issues with anesthesia.  Sister may have had a similar condition.    Examination:  Nonlabored breathing  Not distressed  Patient can make a full right composite fist  No tenderness over the right palm  Patient has a right palm pretendinous cord that extends onto the fourth ray with no associated contracture    A/P: 68-year-old female presenting with isolated right palm Dupuytren's cord no associated contracture    -Discussed the etiology, natural history and treatment options for Dupuytren's disease.  Explained that the time course of progression is variable but has an association with extent of Dupuytren's involvement.  In her case, she has an isolated cord in the right palm with no symptoms and no contracture.  It may be that it takes a long time for a contracture to develop.  My recommendation would be to continue observation and to be honest with herself regarding the impact of the disease process on her overall hand function.  As soon as she notices some impact to hand function, she should return to clinic.  Explained that either percutaneous or operative  open fasciectomy intervention is done typically once a contracture is more established.  Patient understands all this.  All questions answered.  -A total of 45 minutes was devoted to review of chart, direct face-to-face patient counseling and documentation during this encounter, exclusive of any procedure performed.    Nathan Adams MD, PhD

## 2023-06-20 NOTE — PROGRESS NOTES
Ortho Hand    HPI: 68-year-old right-hand-dominant non-smoker presenting with right hand nodule with associated shocklike sensations on occasion.  Patient noticed this several months ago.  The other hand is unaffected.  This is not have any effect on her hand function in her opinion.  She really has no pain.    ROS: Negative, see HPI  Past medical history: Hyperlipidemia, depression  Past surgical history: None to the hands or wrist  Medications: No blood thinners  Allergies: Macrobid, sulfa  Social history: Non-smoker, denies any tobacco or nicotine use  Family history: No bleeding or clotting problems, issues with anesthesia.  Sister may have had a similar condition.    Examination:  Nonlabored breathing  Not distressed  Patient can make a full right composite fist  No tenderness over the right palm  Patient has a right palm pretendinous cord that extends onto the fourth ray with no associated contracture    A/P: 68-year-old female presenting with isolated right palm Dupuytren's cord no associated contracture    -Discussed the etiology, natural history and treatment options for Dupuytren's disease.  Explained that the time course of progression is variable but has an association with extent of Dupuytren's involvement.  In her case, she has an isolated cord in the right palm with no symptoms and no contracture.  It may be that it takes a long time for a contracture to develop.  My recommendation would be to continue observation and to be honest with herself regarding the impact of the disease process on her overall hand function.  As soon as she notices some impact to hand function, she should return to clinic.  Explained that either percutaneous or operative open fasciectomy intervention is done typically once a contracture is more established.  Patient understands all this.  All questions answered.  -A total of 45 minutes was devoted to review of chart, direct face-to-face patient counseling and documentation  during this encounter, exclusive of any procedure performed.    Nathan Adams MD, PhD

## 2023-06-20 NOTE — NURSING NOTE
Reason For Visit:   Chief Complaint   Patient presents with     Consult     Right hand Dupuytren's contracture        Primary MD: System, Provider Not In  Ref. MD: Tyron Morales    Age: 68 year old    ?  No      There were no vitals taken for this visit.      Pain Assessment  Patient Currently in Pain: No (patient denies any pain in right hand)    Hand Dominance Evaluation  Hand Dominance: Right          QuickDASH Assessment      6/20/2023     8:51 AM   QuickDASH Main   1. Open a tight or new jar Mild difficulty   2. Do heavy household chores (e.g., wash walls, floors) No difficulty   3. Carry a shopping bag or briefcase No difficulty   4. Wash your back No difficulty   5. Use a knife to cut food No difficulty   6. Recreational activities in which you take some force or impact through your arm, shoulder or hand (e.g., golf, hammering, tennis, etc.) No difficulty   7. During the past week, to what extent has your arm, shoulder or hand problem interfered with your normal social activities with family, friends, neighbours or groups Not at all   8. During the past week, were you limited in your work or other regular daily activities as a result of your arm, shoulder or hand problem Not limited at all   9. Arm, shoulder or hand pain None   10.Tingling (pins and needles) in your arm,shoulder or hand None   11. During the past week, how much difficulty have you had sleeping because of the pain in your arm, shoulder or hand No difficulty   Quickdash Ability Score 2.27          Current Outpatient Medications   Medication Sig Dispense Refill     albuterol (PROAIR HFA) 108 (90 Base) MCG/ACT inhaler Inhale 2 puffs into the lungs every 6 hours for 30 days 18 g 0     atorvastatin (LIPITOR) 20 MG tablet Take 1 tablet (20 mg) by mouth daily 90 tablet 4     famotidine (PEPCID) 20 MG tablet Take 1 tablet (20 mg) by mouth daily 90 tablet 4     fluconazole (DIFLUCAN) 200 MG tablet Take 1 tablet (200 mg) by mouth daily 1  tablet 0     magic mouthwash suspension (diphenhydrAMINE, lidocaine, aluminum-magnesium & simethicone) Swish and swallow 10 mLs in mouth every 6 hours as needed for mouth sores 280 mL 0     multivitamin w/minerals (MULTI-VITAMIN) tablet Take 1 tablet by mouth daily       sertraline (ZOLOFT) 100 MG tablet Take 1.5 tablets (150 mg) by mouth daily 135 tablet 4     valACYclovir (VALTREX) 500 MG tablet Take 1 tablet (500 mg) by mouth 2 times daily 6 tablet 6       Allergies   Allergen Reactions     Apraclonidine Nausea and Vomiting     Bupropion GI Disturbance and Nausea     Generic only     Macrobid [Nitrofurantoin] Nausea and Vomiting     No Clinical Screening - See Comments      seasonal, cosmetic     Pilocarpine Nausea and Vomiting     Sulfa Antibiotics Unknown       BASIA SOTO, ATC

## 2023-07-24 RX ORDER — FLUCONAZOLE 200 MG/1
200 TABLET ORAL DAILY
Qty: 1 TABLET | Refills: 0 | OUTPATIENT
Start: 2023-07-24

## 2023-07-24 NOTE — TELEPHONE ENCOUNTER
Medication Question or Refill    Contacts         Type Contact Phone/Fax    06/18/2023 10:12 AM CDT Phone (Incoming) [id number] [place], [place] - [address] (Pharmacy) [phone]    07/24/2023 01:22 PM CDT Phone (Incoming) Angelic Correa (Self) [phone] (H)            What medication are you calling about (include dose and sig)?: diflucan    Preferred Pharmacy:  [id number] [place], [place] - [address] [place] [address]  Phone: [phone] Fax: [phone]      Controlled Substance Agreement on file:   CSA -- Patient Level:    CSA: None found at the patient level.       Who prescribed the medication?: pcp    Do you need a refill? Yes    When did you use the medication last? Can't remember    Patient offered an appointment? No    Do you have any questions or concerns?  No      Could we send this information to you in [facility] or would you prefer to receive a phone call?:   Patient would prefer a phone call   Okay to leave a detailed message?: Yes at Home number on file [phone] (home)

## 2023-07-24 NOTE — TELEPHONE ENCOUNTER
"I reviewed management notes from 06/18/2023 by Dr. Terry as covering provider, who recommended the following: \"Start oral fluconazole and Magic mouthwash, if not better in 3 to 5 days, recommend ENT evaluation.\"  Considering this, requested refill for fluconazole was declined.  Jero Schwartz,  on 7/24/2023 at 4:30 PM    "

## 2023-07-24 NOTE — TELEPHONE ENCOUNTER
Writer called patient back, she is wondering if there is anything else she can do in the mean time. Her condition got better, especially her tongue, but has now returned. She would love to see ENT, order pended.   Are there other medications that could be considered? [facility] pharmacies cannot get/make magic mouth wash as there is a shortage for Lidocaine.

## 2023-07-25 NOTE — TELEPHONE ENCOUNTER
Spoke with pt and advised of provider response below. Expressed understanding and acceptance of the plan. Had no further questions at this time.  Advised can call back to clinic at any time with concerns.     Melinda DIAS RN

## 2023-07-25 NOTE — TELEPHONE ENCOUNTER
Most recent nurse triage note reviewed.    Avoidance of spicy foods, or other foods which may irritate the tongue is recommended.    Considering being seen by ENT, the referral if needed is best done by patient's primary care provider.  If she does not have a primary care provider yet, she can be seen by scheduled appointment to get established with someone to include myself if she likes.  When a referral to a specialist is done, the specialist often corresponds with the primary care provider about management.  The provider she saw initially for her oral concerns was working in urgent care, but will also be back in clinic as one of the primary care providers she may get scheduled with after he returns from being temporarily away.  Jero Schwartz, DO on 7/24/2023 at 7:15 PM

## 2023-08-16 DIAGNOSIS — B00.9 HSV (HERPES SIMPLEX VIRUS) INFECTION: ICD-10-CM

## 2023-08-16 RX ORDER — VALACYCLOVIR HYDROCHLORIDE 500 MG/1
500 TABLET, FILM COATED ORAL 2 TIMES DAILY
Qty: 6 TABLET | Refills: 6 | Status: SHIPPED | OUTPATIENT
Start: 2023-08-16 | End: 2023-08-24

## 2023-08-24 RX ORDER — VALACYCLOVIR HYDROCHLORIDE 500 MG/1
500 TABLET, FILM COATED ORAL DAILY
Qty: 90 TABLET | Refills: 3 | Status: SHIPPED | OUTPATIENT
Start: 2023-08-24

## 2023-08-24 NOTE — TELEPHONE ENCOUNTER
Pt called regarding Valtrex     States she takes this every day not just during a break out     States the dosage was adjusted to 500mg one tablet every day prophylaxis     Asking for updated 90 day Rx      Disp Refills Start End FÁTIMA   valACYclovir (VALTREX) 500 MG tablet 6 tablet 6 8/16/2023  No   Sig - Route: Take 1 tablet (500 mg) by mouth 2 times daily - Oral   Sent to pharmacy as: valACYclovir HCl 500 MG Oral Tablet (VALTREX)   Class: E-Prescribe   Order: [id number]   E-Prescribing Status: Receipt confirmed by pharmacy (8/16/2023 11:26 AM CDT)     Printout Tracking    External Result Report     Pharmacy    [id number] [address]     Lucian Puga RN  [facility]

## 2023-09-13 ENCOUNTER — OFFICE VISIT (OUTPATIENT)
Dept: URGENT CARE | Facility: URGENT CARE | Age: 68
End: 2023-09-13
Payer: COMMERCIAL

## 2023-09-13 VITALS
SYSTOLIC BLOOD PRESSURE: 145 MMHG | TEMPERATURE: 97 F | HEART RATE: 76 BPM | DIASTOLIC BLOOD PRESSURE: 93 MMHG | OXYGEN SATURATION: 99 %

## 2023-09-13 DIAGNOSIS — R05.1 ACUTE COUGH: Primary | ICD-10-CM

## 2023-09-13 DIAGNOSIS — J98.01 ACUTE BRONCHOSPASM: ICD-10-CM

## 2023-09-13 LAB — SARS-COV-2 RNA RESP QL NAA+PROBE: NEGATIVE

## 2023-09-13 PROCEDURE — 99214 OFFICE O/P EST MOD 30 MIN: CPT | Performed by: PHYSICIAN ASSISTANT

## 2023-09-13 PROCEDURE — 87635 SARS-COV-2 COVID-19 AMP PRB: CPT | Performed by: PHYSICIAN ASSISTANT

## 2023-09-13 RX ORDER — BENZONATATE 100 MG/1
100 CAPSULE ORAL 3 TIMES DAILY PRN
Qty: 30 CAPSULE | Refills: 0 | Status: SHIPPED | OUTPATIENT
Start: 2023-09-13

## 2023-09-13 RX ORDER — PREDNISONE 20 MG/1
40 TABLET ORAL DAILY
Qty: 10 TABLET | Refills: 0 | Status: SHIPPED | OUTPATIENT
Start: 2023-09-13 | End: 2023-09-18

## 2023-09-13 NOTE — PROGRESS NOTES
Assessment & Plan     Acute cough  Acute problem.  On exam patient is in no acute respiratory distress.  Oxygen saturation is 99% on room air.  COVID PCR is pending.  Tessalon Perles prescribed as needed for cough.  Patient educational information provided regarding course of symptoms.  Advised to keep monitoring symptoms.  Follow-up if any worsening symptoms.  Patient agrees with the plan.    - Symptomatic COVID-19 Virus (Coronavirus) by PCR Nose  - benzonatate (TESSALON) 100 MG capsule  Dispense: 30 capsule; Refill: 0    Acute bronchospasm  Noted on exam today.  Short burst of prednisone.  Fluticasone inhaler also prescribed as patient has had symptoms off and on for the past few months, suspect some reactive airways.  She may use her albuterol inhaler as needed.  Follow-up if any worsening symptoms.  Patient agrees with the plan.  - predniSONE (DELTASONE) 20 MG tablet  Dispense: 10 tablet; Refill: 0  - fluticasone (ARNUITY ELLIPTA) 100 MCG/ACT inhaler  Dispense: 30 each; Refill: 0     32 minutes spent on the date of the encounter doing chart review, history and exam, patient education, documentation, and further activities per the note.      Return in about 1 week (around 9/20/2023) for Symptoms failing to improve.    Ankita Munoz PA-C  [facility] [place]    Vladimir Atwood is a 68 year old female who presents to clinic today for the following health issues:  Chief Complaint   Patient presents with    Urgent Care     Cough, wheezing and phlegm, runny nose about a week now. Neg Covid test done on 09/06/2023. Exposed to covid from sister.        HPI  Patient is presenting to urgent care today with a complaint of a productive cough, wheezing and runny nose.  Onset of symptoms approximately a week ago.  Patient reports she tested for COVID a week ago and was negative.  Patient reports a low-grade fever.  No chest pain or shortness of breath.  Patient reports she was on a prescription of  penicillin for another infection that she completed approximately a week ago.  Treatment tried: Albuterol inhaler.  Patient notes symptoms are similar to bronchitis episode a few months ago.      Review of Systems  Constitutional, HEENT, cardiovascular, pulmonary, GI, , musculoskeletal, neuro, skin, endocrine and psych systems are negative, except as otherwise noted.      Objective    BP (!) 145/93 (BP Location: Right arm, Patient Position: Sitting, Cuff Size: Adult Regular)   Pulse 76   Temp 97  F (36.1  C) (Tympanic)   SpO2 99%   Physical Exam   GENERAL: healthy, alert and no distress  HENT: mouth without ulcers or lesions, tongue with black discoloration--- this is being followed by ENT  RESP: lungs with diffuse expiratory wheezing over the lung fields, rhonchi, no Rales  CV: regular rate and rhythm, normal S1 S2  MS: no gross musculoskeletal defects noted, no edema  SKIN: no suspicious lesions or rashes           6

## 2023-09-22 ENCOUNTER — TRANSFERRED RECORDS (OUTPATIENT)
Dept: HEALTH INFORMATION MANAGEMENT | Facility: CLINIC | Age: 68
End: 2023-09-22
Payer: COMMERCIAL

## 2023-11-15 ENCOUNTER — OFFICE VISIT (OUTPATIENT)
Dept: ENDOCRINOLOGY | Facility: CLINIC | Age: 68
End: 2023-11-15
Attending: INTERNAL MEDICINE
Payer: COMMERCIAL

## 2023-11-15 VITALS
DIASTOLIC BLOOD PRESSURE: 94 MMHG | OXYGEN SATURATION: 100 % | WEIGHT: 167 LBS | SYSTOLIC BLOOD PRESSURE: 161 MMHG | BODY MASS INDEX: 25.77 KG/M2 | HEART RATE: 75 BPM | RESPIRATION RATE: 18 BRPM

## 2023-11-15 DIAGNOSIS — M81.0 AGE-RELATED OSTEOPOROSIS WITHOUT CURRENT PATHOLOGICAL FRACTURE: Primary | ICD-10-CM

## 2023-11-15 DIAGNOSIS — Z92.29 HISTORY OF BISPHOSPHONATE THERAPY: ICD-10-CM

## 2023-11-15 PROCEDURE — 99204 OFFICE O/P NEW MOD 45 MIN: CPT | Performed by: INTERNAL MEDICINE

## 2023-11-15 RX ORDER — RALOXIFENE HYDROCHLORIDE 60 MG/1
60 TABLET, FILM COATED ORAL DAILY
Qty: 30 TABLET | Refills: 5 | Status: SHIPPED | OUTPATIENT
Start: 2023-11-15

## 2023-11-15 NOTE — NURSING NOTE
Chief Complaint   Patient presents with    New Patient     Age-related osteoporosis without current pathological fracture         Vitals:    11/15/23 1054   BP: (!) 158/94   BP Location: Left arm   Patient Position: Sitting   Cuff Size: Adult Regular   Pulse: 75   Resp: 18   SpO2: 100%   Weight: 75.8 kg (167 lb)       Body mass index is 25.77 kg/m .

## 2023-11-15 NOTE — LETTER
11/15/2023         RE: Angelic Correa  [address] [id number]        Dear Colleague,    Thank you for referring your patient, Angelic Correa, to the [facility] [place]. Please see a copy of my visit note below.    Name: Angelic Correa is a 68 year old woman, seen at the request of Dr. Tyron Morales for evaluation of     Chief Complaint   Patient presents with     New Patient     Age-related osteoporosis without current pathological fracture         HPI:  Recent issues:  Here for evaluation of osteoporosis  Reviewed medical history from patient and Epic chart record        Previous history of osteoporosis  Had medical evaluations with Dr. Marilou Bishop/[facility]  Details of initial DEXA imaging and management not available  ~2018. Has taken alendronate medication, records indicate apparent med intolerance with bone or body aches   Took for only 1-2 weeks and discontinue    Previous DEXA scans include:   12/17/18 DEXA:  L1-4   T-score: -3.0   Right fem neck: T-score: -1.4   Left fem neck:  T-score: -1.5     9/13/22 DEXA:  L1-4   T-score: -3.2.  Right fem neck: T-score: -1.6.   Left fem neck:  T-score: -1.5.     Previous [facility] labs include:     Lab Test 04/25/23  0906 08/23/22  1126 05/13/22  0934 09/08/21  1113 06/07/21  1329   PAULA 9.4 9.4 8.9 8.7 9.7     Health history includes:  Menopause:   Age 48-50   Bone fractures:  none  Vit D deficiency:  none   Kidney stones:  none  Osteoporosis meds:  Fosamax  Steroid med use:  Prednisone for URI   Supplements:    None at this time       Dairy intake:   Minimal milk, some cheese   FamHx osteoporosis:  2-sisters, mother    Recent [facility] labs include:  Lab Results   Component Value Date     04/25/2023    POTASSIUM 4.1 04/25/2023    CHLORIDE 104 04/25/2023    CO2 23 04/25/2023    ANIONGAP 14 04/25/2023     (H) 04/25/2023    BUN 17.8 04/25/2023    CR 0.59 04/25/2023    GFRESTIMATED >90 04/25/2023    GFRESTBLACK  >90 2021    PAULA 9.4 2023    TSH 0.95 2023         Lives in [place], [place] but moving to [place], works as cook for [employer]  Has seen Dr. Tyron Morales/[facility]    PMH/PSH:  Past Medical History:   Diagnosis Date     Bursitis of hip, right      Depression      Dupuytren contracture      Glaucoma      HSV (herpes simplex virus) infection      Hyperlipidemia      Osteoporosis      Past Surgical History:   Procedure Laterality Date     LASER SURGERY OF EYE         Family Hx:  Family History   Problem Relation Age of Onset     Glaucoma Mother      Alzheimer Disease Mother      Glaucoma Maternal Aunt      Thyroid Disease Maternal Aunt      Glaucoma Cousin      Parkinsonism Father      Coronary Artery Disease Father      Glaucoma Father      Thyroid Disease Sister      Macular Degeneration No family hx of          Social Hx:  Social History     Socioeconomic History     Marital status: Single     Spouse name: Not on file     Number of children: Not on file     Years of education: Not on file     Highest education level: Not on file   Occupational History     Not on file   Tobacco Use     Smoking status: Former     Types: Cigarettes     Quit date: 1996     Years since quittin.8     Smokeless tobacco: Never   Vaping Use     Vaping Use: Never used   Substance and Sexual Activity     Alcohol use: Yes     Comment: 2-3 drinks daily     Drug use: No     Sexual activity: Yes   Other Topics Concern     Not on file   Social History Narrative     Not on file     Social Determinants of Health     Financial Resource Strain: Not on file   Food Insecurity: Not on file   Transportation Needs: Not on file   Physical Activity: Not on file   Stress: Not on file   Social Connections: Not on file   Interpersonal Safety: Not on file   Housing Stability: Not on file          MEDICATIONS:  has a current medication list which includes the following prescription(s): atorvastatin,  benzonatate, multivitamin w/minerals, raloxifene, sertraline, valacyclovir, albuterol, famotidine, fluconazole, fluticasone, and lidocaine visc 2% 2.5ml/5ml & maalox/mylanta w/ simeth 2.5ml/5ml & diphenhydram*ne 5mg/5ml.    ROS:     ROS: 10 point ROS neg other than the symptoms noted above in the HPI.    GENERAL: some fatigue, mild wt gain; denies fevers, chills, night sweats.    HEENT: no dysphagia, odonophagia, diplopia, neck pain  THYROID:  no apparent hyper or hypothyroid symptoms  CV: no chest pain, pressure, palpitations  LUNGS: no SOB, HU, cough, wheezing   ABDOMEN: bloating, some constipation and diarrhea; no abdominal pain  EXTREMITIES: no rashes, ulcers, edema  NEUROLOGY: no headaches, denies changes in vision, tingling, extremitiy numbness   MSK: no significant muscle aches or pains, weakness  SKIN: no rashes or lesions  : no menses; denies hot flashes  PSYCH:  stable mood, no significant anxiety or depression  ENDOCRINE: no heat or cold intolerance    Physical Exam   VS: BP (!) 161/94 (BP Location: Right arm, Patient Position: Sitting, Cuff Size: Adult Regular)   Pulse 75   Resp 18   Wt 75.8 kg (167 lb)   SpO2 100%   BMI 25.77 kg/m    GENERAL: AXOX3, NAD, well dressed, answering questions appropriately, appears stated age.  THYROID:  normal gland, no apparent nodules or goiter  HEENT: eyeglasses, neck non-tender, no exopthalmous, no proptosis, EOMI  CV: RRR, no rubs, gallops, no murmurs  LUNGS: CTAB, no wheezes, rales, or ronchi  BACK:  nontender to palpation  ABDOMEN: mildly obese, soft, nontender, nondistended  EXTREMITIES: no edema, no lesions  NEUROLOGY: CN grossly intact, no tremors  MSK: grossly intact  SKIN: no rashes, no lesions    LABS:    All pertinent notes, labs, and images personally reviewed by me.     A/P:  Encounter Diagnoses   Name Primary?     Age-related osteoporosis without current pathological fracture Yes     History of bisphosphonate therapy        Comments:  Reviewed  health history and osteoporosis issues.  Previous available DEXA reports indicate worsening BMD 2018 to 2022  Plan:  Discussed general issues with the osteoporosis diagnosis and management  Reviewed concept of using the DEXA scan imaging to assess bone mineral density (BMD)  Discussed terminology of the T-score   We discussed lab tests to assess for secondary causes of bone thinning  Reviewed treatment options with oral bisphosphonate, SERM, IV Boniva vs Reclast, SQ Prolia vs Forteo    Recommend:  We reviewed plan to start an osteoporosis medication, recommended raloxifene (Evista) vs denusamab (Prolia)  After discussion, decided to start raloxifene 60 mg tablet daily  Reviewed raloxifene, dosing, potential benefits and SE's  Raloxifene Rx sent to her pharmacy   Add calcium citrate 500-600 mg as 2-tabs daily, consider calcium citrate with D tablets  Monitor for symptom changes  Plan lab testing in the next 3 months   Lab orders placed  Avoid heavy lifting and fall injuries  Repeat DEXA scan in 11/2024 for comparison    Reviewed some options for a new PCP in [facility]  Patient to follow up with their Primary Care Provider regarding the elevated blood pressure.  Addressed patient questions today    There are no Patient Instructions on file for this visit.    Future labs ordered today:   Orders Placed This Encounter   Procedures     Basic metabolic panel     Vitamin D Deficiency     Radiology/Consults ordered today:     Total time spent on day of encounter:  47 min    Follow-up:  5/2024, Return    ISAEL Obando MD, MS  Endocrinology  [facility]    CC: ROD Morales      Again, thank you for allowing me to participate in the care of your patient.        Sincerely,        Nathan Obando MD

## 2023-11-15 NOTE — PROGRESS NOTES
Name: Angelic Correa is a 68 year old woman, seen at the request of Dr. Tyron Morales for evaluation of     Chief Complaint   Patient presents with    New Patient     Age-related osteoporosis without current pathological fracture         HPI:  Recent issues:  Here for evaluation of osteoporosis  Reviewed medical history from patient and Epic chart record        Previous history of osteoporosis  Had medical evaluations with Dr. Marilou Bishop/[facility]  Details of initial DEXA imaging and management not available  ~2018. Has taken alendronate medication, records indicate apparent med intolerance with bone or body aches   Took for only 1-2 weeks and discontinue    Previous DEXA scans include:   12/17/18 DEXA:  L1-4   T-score: -3.0   Right fem neck: T-score: -1.4   Left fem neck:  T-score: -1.5     9/13/22 DEXA:  L1-4   T-score: -3.2.  Right fem neck: T-score: -1.6.   Left fem neck:  T-score: -1.5.     Previous [facility] labs include:     Lab Test 04/25/23  0906 08/23/22  1126 05/13/22  0934 09/08/21  1113 06/07/21  1329   PAULA 9.4 9.4 8.9 8.7 9.7     Health history includes:  Menopause:   Age 48-50   Bone fractures:  none  Vit D deficiency:  none   Kidney stones:  none  Osteoporosis meds:  Fosamax  Steroid med use:  Prednisone for URI   Supplements:    None at this time       Dairy intake:   Minimal milk, some cheese   FamHx osteoporosis:  2-sisters, mother    Recent [facility] labs include:  Lab Results   Component Value Date     04/25/2023    POTASSIUM 4.1 04/25/2023    CHLORIDE 104 04/25/2023    CO2 23 04/25/2023    ANIONGAP 14 04/25/2023     (H) 04/25/2023    BUN 17.8 04/25/2023    CR 0.59 04/25/2023    GFRESTIMATED >90 04/25/2023    GFRESTBLACK >90 06/07/2021    PAULA 9.4 04/25/2023    TSH 0.95 04/25/2023         Lives in [place], [place] but moving to [facility] [facility], 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  Has seen Dr. Tyron Morales/[facility]    PMH/PSH:  Past Medical History:    Diagnosis Date    Bursitis of hip, right     Depression     Dupuytren contracture     Glaucoma     HSV (herpes simplex virus) infection     Hyperlipidemia     Osteoporosis      Past Surgical History:   Procedure Laterality Date    LASER SURGERY OF EYE         Family Hx:  Family History   Problem Relation Age of Onset    Glaucoma Mother     Alzheimer Disease Mother     Glaucoma Maternal Aunt     Thyroid Disease Maternal Aunt     Glaucoma Cousin     Parkinsonism Father     Coronary Artery Disease Father     Glaucoma Father     Thyroid Disease Sister     Macular Degeneration No family hx of          Social Hx:  Social History     Socioeconomic History    Marital status: Single     Spouse name: Not on file    Number of children: Not on file    Years of education: Not on file    Highest education level: Not on file   Occupational History    Not on file   Tobacco Use    Smoking status: Former     Types: Cigarettes     Quit date: 1996     Years since quittin.8    Smokeless tobacco: Never   Vaping Use    Vaping Use: Never used   Substance and Sexual Activity    Alcohol use: Yes     Comment: 2-3 drinks daily    Drug use: No    Sexual activity: Yes   Other Topics Concern    Not on file   Social History Narrative    Not on file     Social Determinants of Health     Financial Resource Strain: Not on file   Food Insecurity: Not on file   Transportation Needs: Not on file   Physical Activity: Not on file   Stress: Not on file   Social Connections: Not on file   Interpersonal Safety: Not on file   Housing Stability: Not on file          MEDICATIONS:  has a current medication list which includes the following prescription(s): atorvastatin, benzonatate, multivitamin w/minerals, raloxifene, sertraline, valacyclovir, albuterol, famotidine, fluconazole, fluticasone, and lidocaine visc 2% 2.5ml/5ml & maalox/mylanta w/ simeth 2.5ml/5ml & diphenhydram*ne 5mg/5ml.    ROS:     ROS: 10 point ROS neg other than the symptoms noted  above in the HPI.    GENERAL: some fatigue, mild wt gain; denies fevers, chills, night sweats.    HEENT: no dysphagia, odonophagia, diplopia, neck pain  THYROID:  no apparent hyper or hypothyroid symptoms  CV: no chest pain, pressure, palpitations  LUNGS: no SOB, HU, cough, wheezing   ABDOMEN: bloating, some constipation and diarrhea; no abdominal pain  EXTREMITIES: no rashes, ulcers, edema  NEUROLOGY: no headaches, denies changes in vision, tingling, extremitiy numbness   MSK: no significant muscle aches or pains, weakness  SKIN: no rashes or lesions  : no menses; denies hot flashes  PSYCH:  stable mood, no significant anxiety or depression  ENDOCRINE: no heat or cold intolerance    Physical Exam   VS: BP (!) 161/94 (BP Location: Right arm, Patient Position: Sitting, Cuff Size: Adult Regular)   Pulse 75   Resp 18   Wt 75.8 kg (167 lb)   SpO2 100%   BMI 25.77 kg/m    GENERAL: AXOX3, NAD, well dressed, answering questions appropriately, appears stated age.  THYROID:  normal gland, no apparent nodules or goiter  HEENT: eyeglasses, neck non-tender, no exopthalmous, no proptosis, EOMI  CV: RRR, no rubs, gallops, no murmurs  LUNGS: CTAB, no wheezes, rales, or ronchi  BACK:  nontender to palpation  ABDOMEN: mildly obese, soft, nontender, nondistended  EXTREMITIES: no edema, no lesions  NEUROLOGY: CN grossly intact, no tremors  MSK: grossly intact  SKIN: no rashes, no lesions    LABS:    All pertinent notes, labs, and images personally reviewed by me.     A/P:  Encounter Diagnoses   Name Primary?    Age-related osteoporosis without current pathological fracture Yes    History of bisphosphonate therapy        Comments:  Reviewed health history and osteoporosis issues.  Previous available DEXA reports indicate worsening BMD 2018 to 2022  Plan:  Discussed general issues with the osteoporosis diagnosis and management  Reviewed concept of using the DEXA scan imaging to assess bone mineral density (BMD)  Discussed  terminology of the T-score   We discussed lab tests to assess for secondary causes of bone thinning  Reviewed treatment options with oral bisphosphonate, SERM, IV Boniva vs Reclast, SQ Prolia vs Forteo    Recommend:  We reviewed plan to start an osteoporosis medication, recommended raloxifene (Evista) vs denusamab (Prolia)  After discussion, decided to start raloxifene 60 mg tablet daily  Reviewed raloxifene, dosing, potential benefits and SE's  Raloxifene Rx sent to her pharmacy   Add calcium citrate 500-600 mg as 2-tabs daily, consider calcium citrate with D tablets  Monitor for symptom changes  Plan lab testing in the next 3 months   Lab orders placed  Avoid heavy lifting and fall injuries  Repeat DEXA scan in 11/2024 for comparison    Reviewed some options for a new PCP in [facility]  Patient to follow up with their Primary Care Provider regarding the elevated blood pressure.  Addressed patient questions today    There are no Patient Instructions on file for this visit.    Future labs ordered today:   Orders Placed This Encounter   Procedures    Basic metabolic panel    Vitamin D Deficiency     Radiology/Consults ordered today:     Total time spent on day of encounter:  47 min    Follow-up:  5/2024, Return    ISAEL Obando MD, MS  Endocrinology  [facility]    CC: ROD Morales

## 2024-02-08 NOTE — TELEPHONE ENCOUNTER
02/08/2024  Xavier Rodriguez is a 71 y.o., female.      Pre-op Assessment    I have reviewed the Patient Summary Reports.     I have reviewed the Nursing Notes. I have reviewed the NPO Status.   I have reviewed the Medications.     Review of Systems  Anesthesia Hx:  No problems with previous Anesthesia                Social:  Non-Smoker, No Alcohol Use       Hematology/Oncology:  Hematology Normal   Oncology Normal                                   Cardiovascular:     Hypertension           hyperlipidemia                             Pulmonary:  Pulmonary Normal                       Renal/:  Renal/ Normal                 Hepatic/GI:  Hepatic/GI Normal                 Musculoskeletal:  Arthritis               Neurological:    Neuromuscular Disease,             Chronic Pain Syndrome                         Endocrine:        Obesity / BMI > 30  Psych:  Psychiatric History                Past Medical History:   Diagnosis Date    Chronic low back pain     Depression     Dysfunctional voiding of urine 11/10/2021    Patient states she does not urinate.  She was taken off of lasix which was the only thing that helped her empty. PVR 0 ml Chronic constipation, lifelong.  Has been out of Linzess for a month now. 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      David Terry MD     Smoking status: Former     Current packs/day: 0.00     Types: Cigarettes     Passive exposure: Past    Smokeless tobacco: Never    Tobacco comments:     Stop smoking about 15 years ago   Substance and Sexual Activity    Alcohol use: Never    Drug use: Yes     Types: Hydrocodone    Sexual activity: Not Currently     Partners: Male     Birth control/protection: None       Current Outpatient Medications   Medication Sig Dispense Refill    amitriptyline (ELAVIL) 25 MG tablet Take 25 mg by mouth.      furosemide (LASIX) 20 MG tablet TAKE 1 TABLET BY MOUTH EVERY DAY AS NEEDED 30 tablet 0    [START ON 3/22/2024] HYDROcodone-acetaminophen (NORCO) 7.5-325 mg per tablet Take 1 tablet by mouth once daily. 30 tablet 0    [START ON 2/21/2024] HYDROcodone-acetaminophen (NORCO) 7.5-325 mg per tablet Take 1 tablet by mouth once daily. 30 tablet 0    HYDROcodone-acetaminophen (NORCO) 7.5-325 mg per tablet Take 1 tablet by mouth once daily. 30 tablet 0    lovastatin (MEVACOR) 20 MG tablet TAKE 1 TABLET BY MOUTH EVERY DAY IN THE EVENING 90 tablet 1    lubiprostone (AMITIZA) 24 MCG Cap Take 1 capsule (24 mcg total) by mouth 2 (two) times daily with meals. 60 capsule 2    naloxegoL (MOVANTIK) 25 mg tablet Take 25 mg by mouth once daily. 90 tablet 1    naproxen (NAPROSYN) 500 MG tablet TAKE 1 TABLET BY MOUTH EVERY DAY AS NEEDED 30 tablet 1    potassium chloride SA (K-DUR,KLOR-CON) 20 MEQ tablet Take 1 tablet (20 mEq total) by mouth once daily. 90 tablet 1    tamsulosin (FLOMAX) 0.4 mg Cap Take 1 capsule (0.4 mg total) by mouth once daily. 90 capsule 1    topiramate 25 mg capsule Take 25 mg by mouth.       No current facility-administered medications for this encounter.       Review of patient's allergies indicates:  No Known Allergies    Physical Exam  General: Well nourished, Cooperative and Alert    Airway:  Mallampati: II   Mouth Opening: Normal  TM Distance: Normal  Neck ROM: Normal ROM    Dental:  Dentures    Chest/Lungs:  Clear to  auscultation    Heart:  Rate: Normal  Rhythm: Regular Rhythm        Anesthesia Plan  Type of Anesthesia, risks & benefits discussed:    Anesthesia Type: Gen Natural Airway  Intra-op Monitoring Plan: Standard ASA Monitors  Post Op Pain Control Plan: multimodal analgesia  Induction:  IV  Informed Consent: Informed consent signed with the Patient and all parties understand the risks and agree with anesthesia plan.  All questions answered. Patient consented to blood products? Yes  ASA Score: 3  Day of Surgery Review of History & Physical: H&P Update referred to the surgeon/provider.I have interviewed and examined the patient. I have reviewed the patient's H&P dated: There are no significant changes.     Ready For Surgery From Anesthesia Perspective.     .

## 2024-06-23 ENCOUNTER — HEALTH MAINTENANCE LETTER (OUTPATIENT)
Age: 69
End: 2024-06-23

## 2024-09-12 ENCOUNTER — LAB (OUTPATIENT)
Dept: LAB | Facility: CLINIC | Age: 69
End: 2024-09-12
Payer: COMMERCIAL

## 2024-09-12 DIAGNOSIS — M81.0 AGE-RELATED OSTEOPOROSIS WITHOUT CURRENT PATHOLOGICAL FRACTURE: ICD-10-CM

## 2024-09-12 DIAGNOSIS — Z92.29 HISTORY OF BISPHOSPHONATE THERAPY: ICD-10-CM

## 2024-09-12 LAB
ANION GAP SERPL CALCULATED.3IONS-SCNC: 12 MMOL/L (ref 7–15)
BUN SERPL-MCNC: 18.4 MG/DL (ref 8–23)
CALCIUM SERPL-MCNC: 9.2 MG/DL (ref 8.8–10.4)
CHLORIDE SERPL-SCNC: 100 MMOL/L (ref 98–107)
CREAT SERPL-MCNC: 0.68 MG/DL (ref 0.51–0.95)
EGFRCR SERPLBLD CKD-EPI 2021: >90 ML/MIN/1.73M2
GLUCOSE SERPL-MCNC: 105 MG/DL (ref 70–99)
HCO3 SERPL-SCNC: 24 MMOL/L (ref 22–29)
POTASSIUM SERPL-SCNC: 4.6 MMOL/L (ref 3.4–5.3)
SODIUM SERPL-SCNC: 136 MMOL/L (ref 135–145)
VIT D+METAB SERPL-MCNC: 72 NG/ML (ref 20–50)

## 2024-09-12 PROCEDURE — 36415 COLL VENOUS BLD VENIPUNCTURE: CPT

## 2024-09-12 PROCEDURE — 82306 VITAMIN D 25 HYDROXY: CPT

## 2024-09-12 PROCEDURE — 80048 BASIC METABOLIC PNL TOTAL CA: CPT

## 2024-09-16 ENCOUNTER — HOSPITAL ENCOUNTER (OUTPATIENT)
Dept: MAMMOGRAPHY | Facility: CLINIC | Age: 69
Discharge: HOME | End: 2024-09-16
Payer: COMMERCIAL

## 2024-09-16 ENCOUNTER — HOSPITAL ENCOUNTER (OUTPATIENT)
Dept: BONE DENSITY | Facility: CLINIC | Age: 69
Discharge: HOME | End: 2024-09-16
Payer: COMMERCIAL

## 2024-09-16 DIAGNOSIS — Z13.820 OSTEOPOROSIS SCREENING: ICD-10-CM

## 2024-09-16 DIAGNOSIS — Z12.31 VISIT FOR SCREENING MAMMOGRAM: ICD-10-CM

## 2024-09-16 DIAGNOSIS — E28.39 ESTROGEN DEFICIENCY: ICD-10-CM

## 2024-09-16 PROCEDURE — 77080 DXA BONE DENSITY AXIAL: CPT

## 2024-09-16 PROCEDURE — 77063 BREAST TOMOSYNTHESIS BI: CPT

## 2024-09-17 ENCOUNTER — OFFICE VISIT (OUTPATIENT)
Dept: ENDOCRINOLOGY | Facility: CLINIC | Age: 69
End: 2024-09-17
Payer: COMMERCIAL

## 2024-09-17 VITALS
WEIGHT: 170 LBS | DIASTOLIC BLOOD PRESSURE: 98 MMHG | HEART RATE: 73 BPM | BODY MASS INDEX: 26.23 KG/M2 | OXYGEN SATURATION: 95 % | SYSTOLIC BLOOD PRESSURE: 148 MMHG | RESPIRATION RATE: 20 BRPM

## 2024-09-17 DIAGNOSIS — Z92.29 PERSONAL HISTORY OF OTHER DRUG THERAPY: ICD-10-CM

## 2024-09-17 DIAGNOSIS — M81.0 AGE-RELATED OSTEOPOROSIS WITHOUT CURRENT PATHOLOGICAL FRACTURE: Primary | ICD-10-CM

## 2024-09-17 PROCEDURE — G2211 COMPLEX E/M VISIT ADD ON: HCPCS | Performed by: INTERNAL MEDICINE

## 2024-09-17 PROCEDURE — 99214 OFFICE O/P EST MOD 30 MIN: CPT | Performed by: INTERNAL MEDICINE

## 2024-09-17 RX ORDER — OMEPRAZOLE 40 MG/1
CAPSULE, DELAYED RELEASE ORAL
COMMUNITY
Start: 2024-09-10

## 2024-09-17 RX ORDER — LISINOPRIL 10 MG/1
TABLET ORAL
COMMUNITY
Start: 2024-07-19

## 2024-09-17 RX ORDER — ESTRADIOL 0.1 MG/G
CREAM VAGINAL
COMMUNITY
Start: 2024-06-04

## 2024-09-17 RX ORDER — ROSUVASTATIN CALCIUM 5 MG/1
TABLET, COATED ORAL
COMMUNITY
Start: 2024-07-19

## 2024-09-17 NOTE — PROGRESS NOTES
Recent issues:  Osteoporosis follow-up evaluation  Previously tried raloxifene, noted multiple symptoms in 11/2023 and med discontinued  Saw Radha Thompson CNP/[facility] for medical evaluation, started vaginal estogen medication 3 mo ago  Had recent DEXA scan this week        Previous history of osteoporosis  Had medical evaluations with Dr. Marilou [facility]  Details of initial DEXA imaging and management not available  ~2018. Has taken alendronate medication, records indicate apparent med intolerance with bone or body aches   Took for only 1-2 weeks and discontinue  Previous DEXA scans include:   12/17/18 DEXA:  L1-4   T-score: -3.0   Right fem neck: T-score: -1.4   Left fem neck:  T-score: -1.5   9/13/22 DEXA:  L1-4   T-score: -3.2.  Right fem neck: T-score: -1.6.   Left fem neck:  T-score: -1.5.   Previous [facility] labs include:   Lab Test 04/25/23  0906 08/23/22  1126 05/13/22  0934 09/08/21  1113 06/07/21  1329   PAULA 9.4 9.4 8.9 8.7 9.7     Health history includes:  Menopause:   Age 50-50   Bone fractures:  none  Vit D deficiency:  none   Kidney stones:  none  Osteoporosis meds:  Fosamax  Steroid med use:  Prednisone for URI   Supplements:    None at this time       Dairy intake:   Minimal milk, some cheese   FamHx osteoporosis:  2-sisters, mother      11/15/23. Initial endocrinology evaluation with me at [facility]  Reviewed health history and osteoporosis issues  Started Raloxifene 60 mg daily dose, took for few weeks but developed symptoms  Symptoms Experiencing nonstop stomach upset, gas. Muscle tightness in rib area and diaphram. Hot flashes and chills. General malaise.   ~6/2024. Started vaginal estrogen medication per PCP  9/16/24 DEXA:  L1-4   T-score: -3.1  Right fem neck: T-score: -1.1.   Left fem neck:  T-score: -1.5.   Recent [facility] labs include:  Lab Results   Component Value Date     09/12/2024    POTASSIUM 4.6 09/12/2024    CHLORIDE 100 09/12/2024    CO2 24 09/12/2024    ANIONGAP 12  2024     (H) 2024    BUN 18.4 2024    CR 0.68 2024    GFRESTIMATED >90 2024    GFRESTBLACK >90 2021    PAULA 9.2 2024    TSH 0.95 2023    VITDT 72 (H) 2024         Lives in [place], [place] but moving to [facility], works as cook for [employer]  Sees Radha Thompson CNP/[facility] clinic for medical evaluations    PMH/PSH:  Past Medical History:   Diagnosis Date    Bursitis of hip, right     Depression     Dupuytren contracture     Glaucoma     HSV (herpes simplex virus) infection     Hyperlipidemia     Osteoporosis      Past Surgical History:   Procedure Laterality Date    LASER SURGERY OF EYE         Family Hx:  Family History   Problem Relation Age of Onset    Glaucoma Mother     Alzheimer Disease Mother     Glaucoma Maternal Aunt     Thyroid Disease Maternal Aunt     Glaucoma Cousin     Parkinsonism Father     Coronary Artery Disease Father     Glaucoma Father     Thyroid Disease Sister     Macular Degeneration No family hx of          Social Hx:  Social History     Socioeconomic History    Marital status:      Spouse name: Not on file    Number of children: Not on file    Years of education: Not on file    Highest education level: Not on file   Occupational History    Not on file   Tobacco Use    Smoking status: Former     Current packs/day: 0.00     Types: Cigarettes     Quit date: 1996     Years since quittin.7    Smokeless tobacco: Never   Vaping Use    Vaping status: Never Used   Substance and Sexual Activity    Alcohol use: Yes     Comment: 2-3 drinks daily    Drug use: No    Sexual activity: Yes   Other Topics Concern    Not on file   Social History Narrative    Not on file     Social Determinants of Health     Financial Resource Strain: Not on file   Food Insecurity: Not on file   Transportation Needs: Not on file   Physical Activity: Not on file   Stress: Not on file   Social Connections: Not on file   Interpersonal  Safety: Not on file   Housing Stability: Not on file          MEDICATIONS:  has a current medication list which includes the following prescription(s): estradiol, lisinopril, multivitamin w/minerals, omeprazole, rosuvastatin, sertraline, valacyclovir, albuterol, atorvastatin, benzonatate, famotidine, fluconazole, fluticasone, lidocaine visc 2% 2.5ml/5ml & maalox/mylanta w/ simeth 2.5ml/5ml & diphenhydram*ne 5mg/5ml, and raloxifene, and the following Facility-Administered Medications: [START ON 10/1/2024] denosumab.    ROS:     ROS: 10 point ROS neg other than the symptoms noted above in the HPI.    GENERAL: some fatigue, mild wt gain; denies fevers, chills, night sweats.    HEENT: no dysphagia, odonophagia, diplopia, neck pain  THYROID:  no apparent hyper or hypothyroid symptoms  CV: no chest pain, pressure, palpitations  LUNGS: no SOB, HU, cough, wheezing   ABDOMEN: bloating, some constipation and diarrhea; no abdominal pain  EXTREMITIES: no rashes, ulcers, edema  NEUROLOGY: no headaches, denies changes in vision, tingling, extremitiy numbness   MSK: no significant muscle aches or pains, weakness  SKIN: no rashes or lesions  : no menses; denies hot flashes  PSYCH:  stable mood, no significant anxiety or depression  ENDOCRINE: no heat or cold intolerance    Physical Exam   VS: BP (!) 148/98   Pulse 73   Resp 20   Wt 77.1 kg (170 lb)   SpO2 95%   BMI 26.23 kg/m    GENERAL: AXOX3, NAD, well dressed, answering questions appropriately, appears stated age.  THYROID:  normal gland, no apparent nodules or goiter  HEENT: eyeglasses, neck non-tender, no exopthalmous, no proptosis, EOMI  LUNGS: no audible wheeze, cough or visible cyanosis, no visible retractions or increased work of breathing  ABDOMEN: mildly obese, soft, nontender, nondistended  EXTREMITIES: no edema, no lesions  NEUROLOGY: CN grossly intact, no tremors  MSK: grossly intact  SKIN: no rashes, no lesions    LABS:    All pertinent notes, labs, and  images personally reviewed by me.     A/P:  Encounter Diagnoses   Name Primary?    Age-related osteoporosis without current pathological fracture Yes    Personal history of other drug therapy        Comments:  Reviewed health history and osteoporosis issues.  Previous available DEXA reports indicate persistent osteoporosis range bone thinning  Increased risk for osteoporosis fracture    Plan:  Discussed general issues with the osteoporosis diagnosis and management  Reviewed concept of using the DEXA scan imaging to assess bone mineral density (BMD)  Discussed terminology of the T-score   We discussed lab tests to assess for secondary causes of bone thinning  Reviewed medication treatment options for osteoporosis    Recommend:  Advised restarting an osteoporosis medication, reviewed options  Would not take an oral bisphosphonate med or SERM, since SE's previously  Recommended starting denusamab (Prolia) 60 mg injection every 6-months   Reviewed Prolia medication, dosing, expected benefits and possible SE's.  She wishes to proceed.   Prolia CAM order placed, needs insurance check   Will arrange Prolia nurse injection appt at our clinic in 2-3 weeks  Lower calcium citrate 500-600 mg as 1-tab daily, continue MVI daily  Monitor for symptom changes  Avoid heavy lifting and fall injuries  Repeat DEXA scan in 9/2026 for comparison    Keep regular PCP appointments  Patient to follow up with their Primary Care Provider regarding the elevated blood pressure.  Addressed patient questions today    The longitudinal plan of care for the endocrine problem(s) were addressed during this visit.  Due to added complexity of care,   we will continue to support the patient and the subsequent management of this condition with ongoing continuity of care.    There are no Patient Instructions on file for this visit.    Future labs ordered today: No orders of the defined types were placed in this encounter.    Radiology/Consults ordered today:  None    Total time spent on day of encounter:  28 min    Follow-up:  9/2025, Return    ISAEL Obando MD, MS  Endocrinology  [facility]    CC: ANT Thompson

## 2024-09-17 NOTE — NURSING NOTE
Chief Complaint   Patient presents with    Follow Up     Age-related osteoporosis without current pathological fracture +1 more       Vitals:    09/17/24 1134 09/17/24 1138   BP: (!) 156/95 (!) 148/98   BP Location: Left arm    Patient Position: Sitting    Cuff Size: Adult Regular    Pulse: 73    Resp: 20    SpO2: 95%    Weight: 77.1 kg (170 lb)        Body mass index is 26.23 kg/m .

## 2024-10-09 ENCOUNTER — ALLIED HEALTH/NURSE VISIT (OUTPATIENT)
Dept: ENDOCRINOLOGY | Facility: CLINIC | Age: 69
End: 2024-10-09
Payer: COMMERCIAL

## 2024-10-09 DIAGNOSIS — M81.0 AGE-RELATED OSTEOPOROSIS WITHOUT CURRENT PATHOLOGICAL FRACTURE: Primary | ICD-10-CM

## 2024-10-09 DIAGNOSIS — Z92.29 PERSONAL HISTORY OF OTHER DRUG THERAPY: ICD-10-CM

## 2024-10-09 PROCEDURE — 96372 THER/PROPH/DIAG INJ SC/IM: CPT | Performed by: INTERNAL MEDICINE

## 2024-10-09 PROCEDURE — 99207 PR NO CHARGE NURSE ONLY: CPT | Performed by: INTERNAL MEDICINE

## 2024-10-09 NOTE — PROGRESS NOTES
Clinic Administered Medication Documentation      Prolia Documentation    Indication: Prolia  (denosumab) is a prescription medicine used to treat osteoporosis in patients who:   Are at high risk for fracture, meaning patients who have had a fracture related to osteoporosis, or who have multiple risk factors for fracture.  Cannot use another osteoporosis medicine or other osteoporosis medicines did not work well.  The timeline for early/late injections would be 4 weeks early and any time after the 6 month lula. If a patient receives their injection late, then the subsequent injection would be 6 months from the date that they actually received the injection.    When was the last injection?  New  Was the last injection at least 6 months ago? Yes  Has the prior authorization been completed?  Yes  Is there an active order (written within the past 365 days, with administrations remaining, not ) in the chart?  Yes   GFR Estimate   Date Value Ref Range Status   2024 >90 >60 mL/min/1.73m2 Final     Comment:     eGFR calculated using  CKD-EPI equation.   2021 84 >60 mL/min/[1.73_m2] Final     Comment:     Non  GFR Calc  Starting 2018, serum creatinine based estimated GFR (eGFR) will be   calculated using the Chronic Kidney Disease Epidemiology Collaboration   (CKD-EPI) equation.       Has patient had a GFR within the last 12 months? Yes   Is GFR under 30, or patient has a diagnosis of CKD4 or CKD5? No   Patient denies gastric bypass or parathyroid surgery in past 6 months? Yes - patient denies.   Patient denies dental work in the past two months involving drilling into the bone, such as implants/extractions, oral surgery or a tooth extraction that has not healed yet?  Yes  Patient denies plans for an emergency tooth extraction within the next week? Yes    The following steps were completed to comply with the REMS program for Prolia:  Reviewed information in the Medication Guide,  including the serious risks of Prolia  and the symptoms of each risk.  Advised patient to seek prompt medical attention if they have signs or symptoms of any of the serious risks.  Provided each patient a copy of the Medication Guide and Patient Guide.    Prior to injection, verified patient identity using patient's name and date of birth. Medication was administered. Please see MAR and medication order for additional information. Patient instructed to remain in clinic for 15 minutes and report any adverse reaction to staff immediately.    Vial/Syringe: Syringe  Was this medication supplied by the patient? No  Verified that the patient has refills remaining in their prescription.     Angelic Correa comes into clinic today at the request of Dr Obando Ordering Provider for Med Injection only Prolia .    This service provided today was under the supervising provider of the day Dr Obando, who was available if needed.    Faisal Govea RN

## 2024-10-09 NOTE — PATIENT INSTRUCTIONS
You received your Prolia injection today  Your next injection is due in 6 months, scheduled 4/9/25  If you plan on having any dental work done within the next 6 months, please let your dentist know that you are on this medication.   Make sure you do not have any dental work completed involving the jaw bone within 1 month prior to your scheduled injection.   Provided Prolia VIS Sheet to patient.     Faisal Govea RN

## 2024-11-11 ENCOUNTER — TELEPHONE (OUTPATIENT)
Dept: ENDOCRINOLOGY | Facility: CLINIC | Age: 69
End: 2024-11-11
Payer: COMMERCIAL

## 2024-11-11 NOTE — TELEPHONE ENCOUNTER
LVM for pt to call back @ [phone] to reschedule her appt due to provider not in clinic. Please schedule next availability. AY

## 2025-01-20 ENCOUNTER — OFFICE VISIT (OUTPATIENT)
Dept: URGENT CARE | Facility: URGENT CARE | Age: 70
End: 2025-01-20
Payer: COMMERCIAL

## 2025-01-20 VITALS
TEMPERATURE: 97.5 F | RESPIRATION RATE: 20 BRPM | OXYGEN SATURATION: 98 % | DIASTOLIC BLOOD PRESSURE: 88 MMHG | SYSTOLIC BLOOD PRESSURE: 116 MMHG | WEIGHT: 170 LBS | HEART RATE: 84 BPM | BODY MASS INDEX: 26.23 KG/M2

## 2025-01-20 DIAGNOSIS — K12.2 CELLULITIS OF MOUTH: Primary | ICD-10-CM

## 2025-01-20 PROCEDURE — 99213 OFFICE O/P EST LOW 20 MIN: CPT | Performed by: NURSE PRACTITIONER

## 2025-01-20 RX ORDER — CEPHALEXIN 500 MG/1
500 CAPSULE ORAL 2 TIMES DAILY
Qty: 10 CAPSULE | Refills: 0 | Status: SHIPPED | OUTPATIENT
Start: 2025-01-20 | End: 2025-01-25

## 2025-01-20 NOTE — PROGRESS NOTES
Assessment & Plan     Cellulitis of mouth  - cephALEXin (KEFLEX) 500 MG capsule  Dispense: 10 capsule; Refill: 0     Patient Instructions   Offered delbert, pt not interested in any steroid.   Keflex twice a day for 5 days.    Elevate and take antibiotics as directed.    If new, worsening or persistent symptoms, the patient is to call or return for a recheck.        Return in about 1 week (around 1/27/2025) for with regular provider if symptoms persist.    PEYMAN Nava CNP  [facility] [place] is a 69 year old female who presents to clinic today for the following health issues:  Chief Complaint   Patient presents with    Urgent Care     Over a week, canker sore- becoming more sensitive, thinks its becoming infected- diagnosed with black hairy tongue- over the counter stuff is only numbing it but not doing anything.      HPI    Rash    Onset of rash was 1 week(s) ago.   Course of illness is gradual onset and worsening.  Severity moderate  Current and Associated symptoms: painful, red, and blistering   Location of the rash:  left cheek - buccal mucosa.  Previous history of a similar rash? Yes  Recent exposure history: none known  Denies exposure to: none known  Associated symptoms include: nothing.  Treatment measures tried include: antibiotic cream and mouthwash      Review of Systems  Constitutional, HEENT, cardiovascular, pulmonary, GI, , musculoskeletal, neuro, skin, endocrine and psych systems are negative, except as otherwise noted.      Objective    /88   Pulse 84   Temp 97.5  F (36.4  C)   Resp 20   Wt 77.1 kg (170 lb)   SpO2 98%   BMI 26.23 kg/m    Physical Exam   GENERAL: alert and no distress  EYES: Eyes grossly normal to inspection, PERRL and conjunctivae and sclerae normal  HENT: normal cephalic/atraumatic, ear canals and TM's normal, nose and mouth without ulcers or lesions, oral mucous membranes moist, and pale grey ulcerated lesion in  left buccal mucosa with surrounding erythema and edema  RESP: lungs clear to auscultation - no rales, rhonchi or wheezes  CV: regular rate and rhythm, normal S1 S2, no S3 or S4, no murmur, click or rub, no peripheral edema  MS: no gross musculoskeletal defects noted, no edema

## 2025-01-20 NOTE — PATIENT INSTRUCTIONS
Offered kenalog, pt not interested in any steroid.   Keflex twice a day for 5 days.    Elevate and take antibiotics as directed.    If new, worsening or persistent symptoms, the patient is to call or return for a recheck.

## 2025-03-13 ENCOUNTER — ANCILLARY PROCEDURE (OUTPATIENT)
Dept: GENERAL RADIOLOGY | Facility: CLINIC | Age: 70
End: 2025-03-13
Attending: PHYSICIAN ASSISTANT
Payer: COMMERCIAL

## 2025-03-13 ENCOUNTER — OFFICE VISIT (OUTPATIENT)
Dept: URGENT CARE | Facility: URGENT CARE | Age: 70
End: 2025-03-13
Payer: COMMERCIAL

## 2025-03-13 VITALS
HEART RATE: 92 BPM | DIASTOLIC BLOOD PRESSURE: 70 MMHG | BODY MASS INDEX: 25.62 KG/M2 | OXYGEN SATURATION: 97 % | SYSTOLIC BLOOD PRESSURE: 104 MMHG | TEMPERATURE: 98.1 F | RESPIRATION RATE: 20 BRPM | WEIGHT: 166 LBS

## 2025-03-13 DIAGNOSIS — J98.8 WHEEZING-ASSOCIATED RESPIRATORY INFECTION (WARI): Primary | ICD-10-CM

## 2025-03-13 DIAGNOSIS — J98.8 WHEEZING-ASSOCIATED RESPIRATORY INFECTION (WARI): ICD-10-CM

## 2025-03-13 RX ORDER — ALBUTEROL SULFATE 90 UG/1
2 INHALANT RESPIRATORY (INHALATION) EVERY 6 HOURS PRN
Qty: 18 G | Refills: 0 | Status: SHIPPED | OUTPATIENT
Start: 2025-03-13

## 2025-03-13 RX ORDER — AZITHROMYCIN 250 MG/1
TABLET, FILM COATED ORAL
Qty: 6 TABLET | Refills: 0 | Status: SHIPPED | OUTPATIENT
Start: 2025-03-13 | End: 2025-03-18

## 2025-03-13 RX ORDER — BENZONATATE 100 MG/1
100-200 CAPSULE ORAL 3 TIMES DAILY PRN
Qty: 60 CAPSULE | Refills: 0 | Status: SHIPPED | OUTPATIENT
Start: 2025-03-13

## 2025-03-13 RX ORDER — SACCHAROMYCES BOULARDII 250 MG
250 CAPSULE ORAL 2 TIMES DAILY
Qty: 30 CAPSULE | Refills: 0 | Status: SHIPPED | OUTPATIENT
Start: 2025-03-13

## 2025-03-13 NOTE — PROGRESS NOTES
SUBJECTIVE:  Angelic Correa is a 70 year old female who presents to the clinic today with a chief complaint of cough  and wheezing. for 1 week(s).  Her cough is described as productive of yellow sputum and wheezing.    The patient's symptoms are moderate and worsening.  Associated symptoms include wheezing. The patient's symptoms are exacerbated by no particular triggers  Patient has been using inhaler  to improve symptoms.    Past Medical History:   Diagnosis Date    Bursitis of hip, right     Depression     Dupuytren contracture     Glaucoma     HSV (herpes simplex virus) infection     Hyperlipidemia     Osteoporosis        Current Outpatient Medications   Medication Sig Dispense Refill    albuterol (PROAIR HFA/PROVENTIL HFA/VENTOLIN HFA) 108 (90 Base) MCG/ACT inhaler Inhale 2 puffs into the lungs every 6 hours as needed for shortness of breath, wheezing or cough. 18 g 0    amoxicillin-clavulanate (AUGMENTIN) 875-125 MG tablet Take 1 tablet by mouth 2 times daily for 7 days. 14 tablet 0    azithromycin (ZITHROMAX) 250 MG tablet Take 2 tablets (500 mg) by mouth daily for 1 day, THEN 1 tablet (250 mg) daily for 4 days. 6 tablet 0    benzonatate (TESSALON) 100 MG capsule Take 1-2 capsules (100-200 mg) by mouth 3 times daily as needed for cough. 60 capsule 0    calcium citrate-vitamin D (CITRACAL) 200-6.25 MG-MCG TABS per tablet Take 1 tablet by mouth 2 times daily.      lisinopril (ZESTRIL) 10 MG tablet Take one (1) tablet by mouth daily in morning for blood pressure*      multivitamin w/minerals (MULTI-VITAMIN) tablet Take 1 tablet by mouth daily      omeprazole (PRILOSEC) 40 MG DR capsule take 1 capsule by oral route  every day before a meal*      Psyllium (METAMUCIL PO) Take by mouth.      rosuvastatin (CRESTOR) 5 MG tablet Take one (1) tablet orally daily at bedtime for cholesterol*      saccharomyces boulardii (FLORASTOR) 250 MG capsule Take 1 capsule (250 mg) by mouth 2 times daily. Take 2 hours after each  Augmentin dose 30 capsule 0    sertraline (ZOLOFT) 100 MG tablet Take 1.5 tablets (150 mg) by mouth daily 135 tablet 4    valACYclovir (VALTREX) 500 MG tablet Take 1 tablet (500 mg) by mouth daily 90 tablet 3    atorvastatin (LIPITOR) 20 MG tablet Take 1 tablet (20 mg) by mouth daily (Patient not taking: Reported on 3/13/2025) 90 tablet 4    benzonatate (TESSALON) 100 MG capsule Take 1 capsule (100 mg) by mouth 3 times daily as needed for cough (Patient not taking: Reported on 3/13/2025) 30 capsule 0    estradiol (ESTRACE) 0.1 MG/GM vaginal cream Place 1g Per vagina at bedtime 1 time per week* (Patient not taking: Reported on 3/13/2025)      famotidine (PEPCID) 20 MG tablet Take 1 tablet (20 mg) by mouth daily (Patient not taking: Reported on 3/13/2025) 90 tablet 4    fluconazole (DIFLUCAN) 200 MG tablet Take 1 tablet (200 mg) by mouth daily (Patient not taking: Reported on 3/13/2025) 1 tablet 0    fluticasone (ARNUITY ELLIPTA) 100 MCG/ACT inhaler Inhale 1 puff into the lungs daily (Patient not taking: Reported on 3/13/2025) 30 each 0    magic mouthwash suspension (diphenhydrAMINE, lidocaine, aluminum-magnesium & simethicone) Swish and swallow 10 mLs in mouth every 6 hours as needed for mouth sores (Patient not taking: Reported on 3/13/2025) 280 mL 0    raloxifene (EVISTA) 60 MG tablet Take 1 tablet (60 mg) by mouth daily (Patient not taking: Reported on 3/13/2025) 30 tablet 5       Social History     Tobacco Use    Smoking status: Former     Current packs/day: 0.00     Types: Cigarettes     Quit date: 1996     Years since quittin.2    Smokeless tobacco: Never   Substance Use Topics    Alcohol use: Yes     Comment: 2-3 drinks daily       ROS  Review of systems negative except as stated above.    OBJECTIVE:  /70   Pulse 92   Temp 98.1  F (36.7  C) (Oral)   Resp 20   Wt 75.3 kg (166 lb)   SpO2 97%   BMI 25.62 kg/m    GENERAL APPEARANCE: healthy, alert and no distress  EYES: EOMI,  PERRL,  conjunctiva clear  HENT: ear canals and TM's normal.  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

## 2025-03-19 ENCOUNTER — TELEPHONE (OUTPATIENT)
Dept: FAMILY MEDICINE | Facility: CLINIC | Age: 70
End: 2025-03-19

## 2025-03-19 NOTE — TELEPHONE ENCOUNTER
No mention of steroids in the note for this visit.  Will forward to provider (who is working at a different site today) who saw patient to prescribe oral steroid if he thinks that is appropriate based on their visit.

## 2025-03-19 NOTE — TELEPHONE ENCOUNTER
New Medication Request        What medication are you requesting?: Steroid    Reason for medication request: Offered at UC visit but patient denied.  Would like to have it prescribed    Have you taken this medication before?: Yes: 2 years ago    Controlled Substance Agreement on file:   CSA -- Patient Level:    CSA: None found at the patient level.         Patient offered an appointment? No    Preferred Pharmacy:     [id number] [place], [place] - [address] [address] [address]  Phone: [phone] Fax: [phone]        Could we send this information to you in [place] or would you prefer to receive a phone call?:   Patient would prefer a phone call   Okay to leave a detailed message?: Yes at Cell number on file:    Telephone Information:   Mobile [phone]

## 2025-03-20 ENCOUNTER — OFFICE VISIT (OUTPATIENT)
Dept: URGENT CARE | Facility: URGENT CARE | Age: 70
End: 2025-03-20
Payer: COMMERCIAL

## 2025-03-20 VITALS
WEIGHT: 166 LBS | SYSTOLIC BLOOD PRESSURE: 128 MMHG | HEIGHT: 67 IN | HEART RATE: 88 BPM | RESPIRATION RATE: 14 BRPM | DIASTOLIC BLOOD PRESSURE: 74 MMHG | BODY MASS INDEX: 26.06 KG/M2 | TEMPERATURE: 98.1 F | OXYGEN SATURATION: 98 %

## 2025-03-20 DIAGNOSIS — J40 BRONCHITIS WITH ACUTE WHEEZING: Primary | ICD-10-CM

## 2025-03-20 RX ORDER — PREDNISONE 20 MG/1
40 TABLET ORAL DAILY
Qty: 10 TABLET | Refills: 0 | Status: SHIPPED | OUTPATIENT
Start: 2025-03-20 | End: 2025-03-25

## 2025-03-20 RX ORDER — IPRATROPIUM BROMIDE AND ALBUTEROL SULFATE 2.5; .5 MG/3ML; MG/3ML
3 SOLUTION RESPIRATORY (INHALATION) ONCE
Status: COMPLETED | OUTPATIENT
Start: 2025-03-20 | End: 2025-03-20

## 2025-03-20 RX ADMIN — IPRATROPIUM BROMIDE AND ALBUTEROL SULFATE 3 ML: 2.5; .5 SOLUTION RESPIRATORY (INHALATION) at 10:30

## 2025-03-20 ASSESSMENT — ENCOUNTER SYMPTOMS
VOMITING: 0
FEVER: 0
DIARRHEA: 1
SHORTNESS OF BREATH: 1
RHINORRHEA: 1
COUGH: 1
WHEEZING: 1

## 2025-03-20 NOTE — PROGRESS NOTES
Assessment & Plan:        ICD-10-CM    1. Bronchitis with acute wheezing  J40 ipratropium - albuterol 0.5 mg/2.5 mg/3 mL (DUONEB) neb solution 3 mL     predniSONE (DELTASONE) 20 MG tablet            Plan/Clinical Decision Making:    Patient presents with persistent wheezing, cough after recent pneumonia. Using inhaler which helps.   Duo neb done today in clinic which helped with symptoms.   Start course of prednisone. Reviewed side effects.       Return if symptoms worsen or fail to improve, for in 3-5 days.     At the end of the encounter, I discussed results, diagnosis, medications. Discussed red flags for immediate return to clinic/ER, as well as indications for follow up if no improvement. Patient understood and agreed to plan. Patient was stable for discharge.        Angela Galvez PA-C on 3/20/2025 at 10:21 AM          Subjective:     HPI:    Angelic is a 70 year old female who presents to clinic today for the following health issues:  Chief Complaint   Patient presents with    Cough     HAD pnemonia on March 13- she got a z- pack, inahler and amoxicillin -- STILL cough for tight cough for 2 weeks and NOT going away     HPI    Was treated with antibiotics with both Zithromax and Augmentin.   Tight chest, ongoing persistent cough. Hard to sleep.   Dark brown phlegm, started yesterday.     Review of Systems   Constitutional:  Negative for fever.   HENT:  Positive for rhinorrhea (clear). Negative for congestion.    Respiratory:  Positive for cough, shortness of breath and wheezing.    Gastrointestinal:  Positive for diarrhea (with antibiotics). Negative for vomiting.         Patient Active Problem List   Diagnosis    Glaucoma suspect    Senile nuclear sclerosis, bilateral    HSV (herpes simplex virus) infection    Personal history of colon polyps, unspecified    Recurrent depressive disorder, currently in remission    Seasonal affective disorder        Past Medical History:   Diagnosis Date    Bursitis of hip,  "right     Depression     Dupuytren contracture     Glaucoma     HSV (herpes simplex virus) infection     Hyperlipidemia     Osteoporosis        Social History     Tobacco Use    Smoking status: Former     Current packs/day: 0.00     Types: Cigarettes     Quit date: 1996     Years since quittin.2    Smokeless tobacco: Never   Substance Use Topics    Alcohol use: Yes     Comment: 2-3 drinks daily             Objective:     Vitals:    25 1007   BP: 128/74   BP Location: Right arm   Patient Position: Chair   Cuff Size: Adult Regular   Pulse: 88   Resp: 14   Temp: 98.1  F (36.7  C)   TempSrc: Oral   SpO2: 98%   Weight: 75.3 kg (166 lb)   Height: 1.708 m (5' 7.25\")         Physical Exam   EXAM:   Pleasant, alert, appropriate appearance. NAD.  Head Exam: Normocephalic, atraumatic.  Eye Exam: non icteric/injection.    Ear Exam: Left TM mostly blocked with wax, Right TM grey without bulging. Normal canals.  Normal pinna.  Nose Exam: Normal external nose.    OroPharynx Exam:  Moist mucous membranes. No erythema, pharynx without exudate or hypertrophy.  Neck/Thyroid Exam:  No LAD.    Chest/Respiratory Exam:bilateral wheezing  Cardiovascular Exam: RRR. No murmur or rubs.      Results:  No results found for any visits on 25.    "

## 2025-03-24 ENCOUNTER — MYC MEDICAL ADVICE (OUTPATIENT)
Dept: ENDOCRINOLOGY | Facility: CLINIC | Age: 70
End: 2025-03-24
Payer: COMMERCIAL

## 2025-03-24 DIAGNOSIS — Z92.29 PERSONAL HISTORY OF OTHER DRUG THERAPY: ICD-10-CM

## 2025-03-24 DIAGNOSIS — M81.0 AGE-RELATED OSTEOPOROSIS WITHOUT CURRENT PATHOLOGICAL FRACTURE: Primary | ICD-10-CM

## 2025-03-24 NOTE — PROGRESS NOTES
Patient has upcoming Prolia injection on 4/9/25. Patient needs BMP prior to injection. Order placed per RN Prolia Workflow.    Faisal Govea RN

## 2025-04-01 ENCOUNTER — LAB (OUTPATIENT)
Dept: LAB | Facility: CLINIC | Age: 70
End: 2025-04-01
Payer: COMMERCIAL

## 2025-04-01 DIAGNOSIS — Z92.29 PERSONAL HISTORY OF OTHER DRUG THERAPY: ICD-10-CM

## 2025-04-01 DIAGNOSIS — M81.0 AGE-RELATED OSTEOPOROSIS WITHOUT CURRENT PATHOLOGICAL FRACTURE: ICD-10-CM

## 2025-04-01 LAB
ANION GAP SERPL CALCULATED.3IONS-SCNC: 12 MMOL/L (ref 7–15)
BUN SERPL-MCNC: 13 MG/DL (ref 8–23)
CALCIUM SERPL-MCNC: 9.1 MG/DL (ref 8.8–10.4)
CHLORIDE SERPL-SCNC: 106 MMOL/L (ref 98–107)
CREAT SERPL-MCNC: 0.68 MG/DL (ref 0.51–0.95)
EGFRCR SERPLBLD CKD-EPI 2021: >90 ML/MIN/1.73M2
GLUCOSE SERPL-MCNC: 112 MG/DL (ref 70–99)
HCO3 SERPL-SCNC: 22 MMOL/L (ref 22–29)
POTASSIUM SERPL-SCNC: 4.2 MMOL/L (ref 3.4–5.3)
SODIUM SERPL-SCNC: 140 MMOL/L (ref 135–145)

## 2025-04-01 PROCEDURE — 80048 BASIC METABOLIC PNL TOTAL CA: CPT

## 2025-04-01 PROCEDURE — 36415 COLL VENOUS BLD VENIPUNCTURE: CPT

## 2025-04-09 ENCOUNTER — ALLIED HEALTH/NURSE VISIT (OUTPATIENT)
Dept: ENDOCRINOLOGY | Facility: CLINIC | Age: 70
End: 2025-04-09
Payer: COMMERCIAL

## 2025-04-09 DIAGNOSIS — M81.0 AGE-RELATED OSTEOPOROSIS WITHOUT CURRENT PATHOLOGICAL FRACTURE: Primary | ICD-10-CM

## 2025-04-09 DIAGNOSIS — Z92.29 PERSONAL HISTORY OF OTHER DRUG THERAPY: ICD-10-CM

## 2025-04-09 PROCEDURE — 99207 PR NO CHARGE NURSE ONLY: CPT | Performed by: INTERNAL MEDICINE

## 2025-04-09 PROCEDURE — 96372 THER/PROPH/DIAG INJ SC/IM: CPT | Performed by: INTERNAL MEDICINE

## 2025-04-09 NOTE — PATIENT INSTRUCTIONS
You received your Prolia injection today  Your next injection is due in 6 months, scheduled 10/10/25  If you plan on having any dental work done within the next 6 months, please let your dentist know that you are on this medication.   Make sure you do not have any dental work completed involving the jaw bone within 1 month prior to your scheduled injection.   Provided Prolia VIS Sheet to patient.     Faisal Govea RN

## 2025-04-09 NOTE — PROGRESS NOTES
Clinic Administered Medication Documentation      Prolia Documentation    Indication: Prolia  (denosumab) is a prescription medicine used to treat osteoporosis in patients who:   Are at high risk for fracture, meaning patients who have had a fracture related to osteoporosis, or who have multiple risk factors for fracture.  Cannot use another osteoporosis medicine or other osteoporosis medicines did not work well.  The timeline for early/late injections would be 4 weeks early and any time after the 6 month lula. If a patient receives their injection late, then the subsequent injection would be 6 months from the date that they actually received the injection.    When was the last injection?  10/9/24  Was the last injection at least 6 months ago? Yes  Has the prior authorization been completed?  Yes  Is there an active order (written within the past 365 days, with administrations remaining, not ) in the chart?  Yes   GFR Estimate   Date Value Ref Range Status   2025 >90 >60 mL/min/1.73m2 Final     Comment:     eGFR calculated using  CKD-EPI equation.   2021 84 >60 mL/min/[1.73_m2] Final     Comment:     Non  GFR Calc  Starting 2018, serum creatinine based estimated GFR (eGFR) will be   calculated using the Chronic Kidney Disease Epidemiology Collaboration   (CKD-EPI) equation.       Has patient had a GFR within the last 12 months? Yes   Is GFR under 30, or patient has a diagnosis of CKD4 or CKD5? No   Patient denies gastric bypass or parathyroid surgery in past 6 months? Yes - patient denies.   Patient denies undergoing any dental procedures involving drilling into the bone, such as implants, extractions, or oral surgery, within the past two months that have not yet healed?  Yes - patient denies  Patient denies plans for an emergency tooth extraction within the next week? Yes    The following steps were completed to comply with the REMS program for Prolia:  Reviewed  information in the Medication Guide, including the serious risks of Prolia  and the symptoms of each risk.  Advised patient to seek prompt medical attention if they have signs or symptoms of any of the serious risks.  Provided each patient a copy of the Medication Guide and Patient Guide.    Prior to injection, verified patient identity using patient's name and date of birth. Medication was administered. Please see MAR and medication order for additional information. Patient instructed to remain in clinic for 15 minutes and report any adverse reaction to staff immediately.    Vial/Syringe: Syringe  Was this medication supplied by the patient? No  Verified that the patient has administrations remaining in their prescription.     Angelic Correa comes into clinic today at the request of Dr Obando Ordering Provider for Med Injection only Prolia .    This service provided today was under the supervising provider of the day Dr Obando, who was available if needed.    Faisal Govea RN

## 2025-05-15 NOTE — NURSING NOTE
Provider at bedside.     Cristina London RN  05/15/25 1111     Screening Questionnaire for Adult Immunization    Are you sick today?   No   Do you have allergies to medications, food, a vaccine component or latex?   Yes   Have you ever had a serious reaction after receiving a vaccination?   No   Do you have a long-term health problem with heart disease, lung disease, asthma, kidney disease, metabolic disease (e.g. diabetes), anemia, or other blood disorder?   No   Do you have cancer, leukemia, HIV/AIDS, or any other immune system problem?   No   In the past 3 months, have you taken medications that affect  your immune system, such as prednisone, other steroids, or anticancer drugs; drugs for the treatment of rheumatoid arthritis, Crohn s disease, or psoriasis; or have you had radiation treatments?   No   Have you had a seizure, or a brain or other nervous system problem?   No   During the past year, have you received a transfusion of blood or blood     products, or been given immune (gamma) globulin or antiviral drug?   No   For women: Are you pregnant or is there a chance you could become        pregnant during the next month?   No   Have you received any vaccinations in the past 4 weeks?   No     Immunization questionnaire was positive for at least one answer.        Per orders of Priscila Rodriguez PA-C, injection of Hep A-adult given by Princess ISAAK West CMA. Patient instructed to remain in clinic for 15 minutes afterwards, and to report any adverse reaction to me immediately.       Screening performed by Princess ISAAK West CMA on 8/27/2019 at 11:06 AM.

## 2025-07-12 ENCOUNTER — HEALTH MAINTENANCE LETTER (OUTPATIENT)
Age: 70
End: 2025-07-12